# Patient Record
Sex: FEMALE | Race: WHITE | NOT HISPANIC OR LATINO | Employment: OTHER | URBAN - METROPOLITAN AREA
[De-identification: names, ages, dates, MRNs, and addresses within clinical notes are randomized per-mention and may not be internally consistent; named-entity substitution may affect disease eponyms.]

---

## 2019-01-09 ENCOUNTER — TELEPHONE (OUTPATIENT)
Dept: SCHEDULING | Facility: IMAGING CENTER | Age: 69
End: 2019-01-09

## 2019-02-25 ENCOUNTER — OFFICE VISIT (OUTPATIENT)
Dept: MEDICAL GROUP | Facility: MEDICAL CENTER | Age: 69
End: 2019-02-25
Payer: MEDICARE

## 2019-02-25 VITALS
OXYGEN SATURATION: 95 % | SYSTOLIC BLOOD PRESSURE: 132 MMHG | HEART RATE: 82 BPM | DIASTOLIC BLOOD PRESSURE: 88 MMHG | TEMPERATURE: 97.2 F | HEIGHT: 65 IN | BODY MASS INDEX: 43.97 KG/M2 | WEIGHT: 263.89 LBS

## 2019-02-25 DIAGNOSIS — Z00.00 HEALTH CARE MAINTENANCE: ICD-10-CM

## 2019-02-25 DIAGNOSIS — I10 ESSENTIAL HYPERTENSION: ICD-10-CM

## 2019-02-25 DIAGNOSIS — R73.01 IFG (IMPAIRED FASTING GLUCOSE): ICD-10-CM

## 2019-02-25 DIAGNOSIS — E78.5 DYSLIPIDEMIA: ICD-10-CM

## 2019-02-25 DIAGNOSIS — D51.0 PERNICIOUS ANEMIA: ICD-10-CM

## 2019-02-25 DIAGNOSIS — M17.0 PRIMARY OSTEOARTHRITIS OF BOTH KNEES: ICD-10-CM

## 2019-02-25 DIAGNOSIS — Z76.89 ENCOUNTER TO ESTABLISH CARE: ICD-10-CM

## 2019-02-25 DIAGNOSIS — Z12.39 SCREENING FOR MALIGNANT NEOPLASM OF BREAST: ICD-10-CM

## 2019-02-25 PROCEDURE — 99204 OFFICE O/P NEW MOD 45 MIN: CPT | Performed by: INTERNAL MEDICINE

## 2019-02-25 RX ORDER — PNEUMOCOCCAL VACCINE POLYVALENT 25; 25; 25; 25; 25; 25; 25; 25; 25; 25; 25; 25; 25; 25; 25; 25; 25; 25; 25; 25; 25; 25; 25 UG/.5ML; UG/.5ML; UG/.5ML; UG/.5ML; UG/.5ML; UG/.5ML; UG/.5ML; UG/.5ML; UG/.5ML; UG/.5ML; UG/.5ML; UG/.5ML; UG/.5ML; UG/.5ML; UG/.5ML; UG/.5ML; UG/.5ML; UG/.5ML; UG/.5ML; UG/.5ML; UG/.5ML; UG/.5ML; UG/.5ML
INJECTION, SOLUTION INTRAMUSCULAR; SUBCUTANEOUS
COMMUNITY
Start: 2019-01-17 | End: 2020-09-14

## 2019-02-25 RX ORDER — ATORVASTATIN CALCIUM 10 MG/1
TABLET, FILM COATED ORAL
COMMUNITY
Start: 2018-12-15 | End: 2019-10-30 | Stop reason: SDUPTHER

## 2019-02-25 RX ORDER — IRBESARTAN 300 MG/1
TABLET ORAL
COMMUNITY
Start: 2019-01-14 | End: 2019-07-08 | Stop reason: SDUPTHER

## 2019-02-25 RX ORDER — CITALOPRAM 20 MG/1
TABLET ORAL
COMMUNITY
Start: 2018-12-15 | End: 2019-08-19 | Stop reason: SDUPTHER

## 2019-02-25 RX ORDER — HYDROCHLOROTHIAZIDE 25 MG/1
TABLET ORAL
COMMUNITY
Start: 2019-02-13 | End: 2019-09-16

## 2019-02-25 ASSESSMENT — PATIENT HEALTH QUESTIONNAIRE - PHQ9: CLINICAL INTERPRETATION OF PHQ2 SCORE: 0

## 2019-02-25 NOTE — PROGRESS NOTES
CHIEF COMPLAINT  Chief Complaint   Patient presents with   • Establish Care   • Fatigue   • Arthritis     (L) Knee Pain     HPI  Patient is a 69 y.o. female patient who presents today for the following     Hypertension  Meds: Irbesartan 300 mg QD, HCTZ, 25 mg daily, taking as prescribed.   Measuring BP at home: no  Denies:  -  headaches, vision problems, tinnitus.                 -  chest pain/pressure, palpitations, irregular heart beats, exertional, dyspnea, peripheral edema.      - medication side effect: unusual fatigue, slow heartbeat, foot/leg swelling, cough.  Low salt diet: advised  Diet: regular  Exercise: limited  BMI: 43  FH of HTN: unknown    Dyslipidemia  The patient is on atorvastatin, 10 mg, taking daily, as prescribed. No myalgias, muscle cramps or pain.   Diet /Exercise/BMI:  As above  FH: sister    IFG  The patient had elevated FBG, and A1c.  No polydipsia, polyphagia, polyuria.  No abdominal pain, weight loss, fatigue.  Diet/exercise/BMI: As above  FH of DM: sister    Pernicious anemia  The patient was diagnosed with atrophic gastritis on EGD.  Placed on vitamin B12 OTC, improved levels of B12 and CBC.  Pending records.    DJD knees  The patient has history of osteoarthritis of both knees, more on the left side  - per patient, x-ray of knee showed loss of cartilage bilaterally, more on the left side.  She had local steroid injections in the past, requested referral.  Denies:  -Trauma  -Fever, chills, redness, swelling.    OBESITY, Body mass index is 43.58 kg/m².  Onset: since childhood  Diet: Regular  Exercise: Limited  No temperature intolerance. No change in hair/skin quality, BMs.   No HTN, buffalo hump, purple striae, flushing.  FH of obesity: multiple    Reviewed PMH, PSH, FH, SH, ALL, HCM/IMM  Reviewed MEDS    Patient Active Problem List    Diagnosis Date Noted   • Essential hypertension 02/25/2019   • Dyslipidemia 02/25/2019   • IFG (impaired fasting glucose) 02/25/2019   • Primary  osteoarthritis of both knees 02/25/2019   • Pernicious anemia 02/25/2019   • Screening for malignant neoplasm of breast 02/25/2019     CURRENT MEDICATIONS  Current Outpatient Prescriptions   Medication Sig Dispense Refill   • atorvastatin (LIPITOR) 10 MG Tab      • citalopram (CELEXA) 20 MG Tab      • hydroCHLOROthiazide (HYDRODIURIL) 25 MG Tab      • irbesartan (AVAPRO) 300 MG Tab      • PNEUMOVAX 23 25 MCG/0.5ML Injection        No current facility-administered medications for this visit.      ALLERGIES  Allergies: Patient has no known allergies.  PAST MEDICAL HISTORY  Past Medical History:   Diagnosis Date   • Anemia, pernicious    • Anxiety    • Atrophic gastritis    • Hyperlipidemia    • Hypertension    • Osteoarthritis    • Prediabetes     fasting glucose 102   • Tendonitis     chronic, elbows and wrists     SURGICAL HISTORY  She  has a past surgical history that includes cholecystectomy (2014).  SOCIAL HISTORY  Social History   Substance Use Topics   • Smoking status: Former Smoker     Packs/day: 1.00     Years: 30.00     Types: Cigarettes     Quit date: 1992   • Smokeless tobacco: Never Used   • Alcohol use 0.6 oz/week     1 Glasses of wine per week      Comment: 3 times/week     Social History     Social History Narrative   • No narrative on file     FAMILY HISTORY  Family History   Problem Relation Age of Onset   • Alzheimer's Disease Mother 50        early onset   • Cancer Father 50        laryngeal, mets to bladder   • Heart Attack Sister 56   • Diabetes Sister    • Cancer Maternal Aunt 50        colon   • Colon Cancer Sister 69     Family Status   Relation Status   • Mo (Not Specified)   • Fa (Not Specified)   • Sis (Not Specified)   • MAunt (Not Specified)   • Sis (Not Specified)       ROS   Constitutional: Negative for fever, chills.  HENT: Negative for congestion, sore throat.  Eyes: Negative for blurred vision.   Respiratory: Negative for cough, shortness of breath.  Cardiovascular: Negative for  "chest pain, palpitations. And per HPI.  Gastrointestinal: Negative for heartburn, abdominal pain.   Genitourinary: Negative for dysuria/incontinence.  Musculoskeletal: As above.  Skin: Negative for rash and itching.   Neuro: Negative for dizziness, weakness and headaches.   Endo/Heme/Allergies: Does not bruise/bleed easily. And per HPI.  Psychiatric/Behavioral: Negative for depression.    PHYSICAL EXAM   Blood pressure 132/88, pulse 82, temperature 36.2 °C (97.2 °F), temperature source Temporal, height 1.657 m (5' 5.25\"), weight 119.7 kg (263 lb 14.3 oz), SpO2 95 %. Body mass index is 43.58 kg/m².  General:  NAD, well appearing  HEENT:   NC/AT, PERRLA, EOMI, TMs are clear. Oropharyngeal mucosa is pink,  without lesions;  no cervical / supraclavicular  lymphadenopathy, no thyromegaly.    Cardiovascular: RRR.   No m/r/g. No carotid bruits .      Lungs:   CTAB, no w/r/r, no respiratory distress.  Abdomen: Soft, NT/ND + BS; unable to palpate liver/spleen due to obesity.  Extremities:  2+ DP and radial pulses bilaterally.  No c/c/e.   Skin:  Warm, dry.  No erythema. No rash.   Neurologic: Alert & oriented x 3. CN II-XII grossly intact. No focal deficits.  Psychiatric:  Affect normal, mood normal, judgment normal.    LABS     Pending records and labs    IMAGING     None    ASSESMENT AND PLAN        1. Essential hypertension  Controlled, continue current treatment / monitor blood pressure at home  - Comp Metabolic Panel; Future    2. Dyslipidemia  Pending labs, continue current treatment  - Comp Metabolic Panel; Future  - Lipid Profile; Future    3. IFG (impaired fasting glucose)  Discussed about risk to develop DM.   Advised low carb diet, exercise, watch for WT.   - Comp Metabolic Panel; Future  - HEMOGLOBIN A1C; Future  - MICROALBUMIN CREAT RATIO URINE; Future    4. Pernicious anemia  Pending labs, continue current supplement  - CBC WITH DIFFERENTIAL; Future  - VIT B12,  FOLIC ACID    5. Primary osteoarthritis of both " knees  Referred to orthopedics per patient request  - REFERRAL TO ORTHOPEDICS    6. BMI 40.0-44.9, adult (HCC)  - OBESITY COUNSELING (No Charge): Patient identified as having weight management issue.  Appropriate orders and counseling given.    7. Health care maintenance  Pending records    8. Screening for malignant neoplasm of breast  - MA-SCREEN MAMMO W/CAD-BILAT; Future    9. Encounter to establish care  Reviewed PMH, PSH, FH, SH, ALL, MEDS, HCM/IMM.   Release form for old records: signed    Counseling:   - Smoking:  Nonsmoker    Followup: Return in about 4 weeks (around 3/25/2019), or if symptoms worsen or fail to improve.    All questions are answered.    Please note that this dictation was created using voice recognition software, and that there might be errors of angelina and possibly content.

## 2019-03-12 ENCOUNTER — HOSPITAL ENCOUNTER (OUTPATIENT)
Dept: LAB | Facility: MEDICAL CENTER | Age: 69
End: 2019-03-12
Attending: INTERNAL MEDICINE
Payer: MEDICARE

## 2019-03-12 DIAGNOSIS — R73.01 IFG (IMPAIRED FASTING GLUCOSE): ICD-10-CM

## 2019-03-12 DIAGNOSIS — E78.5 DYSLIPIDEMIA: ICD-10-CM

## 2019-03-12 DIAGNOSIS — I10 ESSENTIAL HYPERTENSION: ICD-10-CM

## 2019-03-12 DIAGNOSIS — D51.0 PERNICIOUS ANEMIA: ICD-10-CM

## 2019-03-12 LAB
ALBUMIN SERPL BCP-MCNC: 3.9 G/DL (ref 3.2–4.9)
ALBUMIN/GLOB SERPL: 1.3 G/DL
ALP SERPL-CCNC: 104 U/L (ref 30–99)
ALT SERPL-CCNC: 13 U/L (ref 2–50)
ANION GAP SERPL CALC-SCNC: 5 MMOL/L (ref 0–11.9)
ANISOCYTOSIS BLD QL SMEAR: ABNORMAL
AST SERPL-CCNC: 14 U/L (ref 12–45)
BASOPHILS # BLD AUTO: 1.5 % (ref 0–1.8)
BASOPHILS # BLD: 0.1 K/UL (ref 0–0.12)
BILIRUB SERPL-MCNC: 0.4 MG/DL (ref 0.1–1.5)
BUN SERPL-MCNC: 11 MG/DL (ref 8–22)
CALCIUM SERPL-MCNC: 8.8 MG/DL (ref 8.5–10.5)
CHLORIDE SERPL-SCNC: 104 MMOL/L (ref 96–112)
CHOLEST SERPL-MCNC: 167 MG/DL (ref 100–199)
CO2 SERPL-SCNC: 25 MMOL/L (ref 20–33)
COMMENT 1642: NORMAL
CREAT SERPL-MCNC: 0.54 MG/DL (ref 0.5–1.4)
CREAT UR-MCNC: 94.6 MG/DL
EOSINOPHIL # BLD AUTO: 0.27 K/UL (ref 0–0.51)
EOSINOPHIL NFR BLD: 3.9 % (ref 0–6.9)
ERYTHROCYTE [DISTWIDTH] IN BLOOD BY AUTOMATED COUNT: 43.1 FL (ref 35.9–50)
EST. AVERAGE GLUCOSE BLD GHB EST-MCNC: 117 MG/DL
FASTING STATUS PATIENT QL REPORTED: NORMAL
GIANT PLATELETS BLD QL SMEAR: NORMAL
GLOBULIN SER CALC-MCNC: 2.9 G/DL (ref 1.9–3.5)
GLUCOSE SERPL-MCNC: 103 MG/DL (ref 65–99)
HBA1C MFR BLD: 5.7 % (ref 0–5.6)
HCT VFR BLD AUTO: 37.4 % (ref 37–47)
HDLC SERPL-MCNC: 51 MG/DL
HGB BLD-MCNC: 10.9 G/DL (ref 12–16)
IMM GRANULOCYTES # BLD AUTO: 0.02 K/UL (ref 0–0.11)
IMM GRANULOCYTES NFR BLD AUTO: 0.3 % (ref 0–0.9)
LDLC SERPL CALC-MCNC: 83 MG/DL
LYMPHOCYTES # BLD AUTO: 1.77 K/UL (ref 1–4.8)
LYMPHOCYTES NFR BLD: 25.7 % (ref 22–41)
MCH RBC QN AUTO: 21.2 PG (ref 27–33)
MCHC RBC AUTO-ENTMCNC: 29.1 G/DL (ref 33.6–35)
MCV RBC AUTO: 72.8 FL (ref 81.4–97.8)
MICROALBUMIN UR-MCNC: <0.7 MG/DL
MICROALBUMIN/CREAT UR: NORMAL MG/G (ref 0–30)
MICROCYTES BLD QL SMEAR: ABNORMAL
MONOCYTES # BLD AUTO: 0.55 K/UL (ref 0–0.85)
MONOCYTES NFR BLD AUTO: 8 % (ref 0–13.4)
MORPHOLOGY BLD-IMP: NORMAL
NEUTROPHILS # BLD AUTO: 4.18 K/UL (ref 2–7.15)
NEUTROPHILS NFR BLD: 60.6 % (ref 44–72)
NRBC # BLD AUTO: 0 K/UL
NRBC BLD-RTO: 0 /100 WBC
OVALOCYTES BLD QL SMEAR: NORMAL
PLATELET # BLD AUTO: 511 K/UL (ref 164–446)
PLATELET BLD QL SMEAR: NORMAL
PMV BLD AUTO: 9.6 FL (ref 9–12.9)
POIKILOCYTOSIS BLD QL SMEAR: NORMAL
POTASSIUM SERPL-SCNC: 3.7 MMOL/L (ref 3.6–5.5)
PROT SERPL-MCNC: 6.8 G/DL (ref 6–8.2)
RBC # BLD AUTO: 5.14 M/UL (ref 4.2–5.4)
RBC BLD AUTO: PRESENT
SODIUM SERPL-SCNC: 134 MMOL/L (ref 135–145)
TRIGL SERPL-MCNC: 165 MG/DL (ref 0–149)
WBC # BLD AUTO: 6.9 K/UL (ref 4.8–10.8)

## 2019-03-12 PROCEDURE — 82570 ASSAY OF URINE CREATININE: CPT

## 2019-03-12 PROCEDURE — 36415 COLL VENOUS BLD VENIPUNCTURE: CPT

## 2019-03-12 PROCEDURE — 80061 LIPID PANEL: CPT

## 2019-03-12 PROCEDURE — 83036 HEMOGLOBIN GLYCOSYLATED A1C: CPT | Mod: GA

## 2019-03-12 PROCEDURE — 80053 COMPREHEN METABOLIC PANEL: CPT

## 2019-03-12 PROCEDURE — 82043 UR ALBUMIN QUANTITATIVE: CPT

## 2019-03-12 PROCEDURE — 85025 COMPLETE CBC W/AUTO DIFF WBC: CPT

## 2019-03-13 ENCOUNTER — HOSPITAL ENCOUNTER (OUTPATIENT)
Dept: LAB | Facility: MEDICAL CENTER | Age: 69
End: 2019-03-13
Attending: INTERNAL MEDICINE
Payer: MEDICARE

## 2019-03-13 LAB
FOLATE SERPL-MCNC: 10.4 NG/ML
VIT B12 SERPL-MCNC: 675 PG/ML (ref 211–911)

## 2019-03-13 PROCEDURE — 82607 VITAMIN B-12: CPT

## 2019-03-13 PROCEDURE — 82746 ASSAY OF FOLIC ACID SERUM: CPT

## 2019-03-18 ENCOUNTER — HOSPITAL ENCOUNTER (OUTPATIENT)
Dept: RADIOLOGY | Facility: MEDICAL CENTER | Age: 69
End: 2019-03-18
Attending: INTERNAL MEDICINE
Payer: MEDICARE

## 2019-03-18 DIAGNOSIS — Z12.39 SCREENING FOR MALIGNANT NEOPLASM OF BREAST: ICD-10-CM

## 2019-03-18 PROCEDURE — 77063 BREAST TOMOSYNTHESIS BI: CPT

## 2019-03-25 ENCOUNTER — OFFICE VISIT (OUTPATIENT)
Dept: MEDICAL GROUP | Facility: MEDICAL CENTER | Age: 69
End: 2019-03-25
Payer: MEDICARE

## 2019-03-25 VITALS
TEMPERATURE: 97 F | OXYGEN SATURATION: 95 % | DIASTOLIC BLOOD PRESSURE: 80 MMHG | WEIGHT: 259.26 LBS | BODY MASS INDEX: 43.2 KG/M2 | HEIGHT: 65 IN | SYSTOLIC BLOOD PRESSURE: 118 MMHG | HEART RATE: 96 BPM

## 2019-03-25 DIAGNOSIS — D51.0 PERNICIOUS ANEMIA: ICD-10-CM

## 2019-03-25 DIAGNOSIS — I10 ESSENTIAL HYPERTENSION: ICD-10-CM

## 2019-03-25 DIAGNOSIS — E55.9 HYPOVITAMINOSIS D: ICD-10-CM

## 2019-03-25 DIAGNOSIS — E78.5 DYSLIPIDEMIA: ICD-10-CM

## 2019-03-25 DIAGNOSIS — R73.01 IFG (IMPAIRED FASTING GLUCOSE): ICD-10-CM

## 2019-03-25 PROCEDURE — 99214 OFFICE O/P EST MOD 30 MIN: CPT | Performed by: INTERNAL MEDICINE

## 2019-03-25 RX ORDER — CYANOCOBALAMIN 1000 UG/ML
1000 INJECTION, SOLUTION INTRAMUSCULAR; SUBCUTANEOUS ONCE
Status: DISCONTINUED | OUTPATIENT
Start: 2019-03-25 | End: 2019-03-25

## 2019-03-25 RX ORDER — CHOLECALCIFEROL (VITAMIN D3) 25 MCG
1 TABLET,CHEWABLE ORAL DAILY
Qty: 90 TAB | Refills: 1 | Status: SHIPPED | OUTPATIENT
Start: 2019-03-25 | End: 2020-09-14

## 2019-03-25 NOTE — PROGRESS NOTES
CHIEF COMPLAINT  Chief Complaint   Patient presents with   • Follow-Up     4 week   Hypertension, IFG, labs    HPI  Patient is a 69 y.o. female patient who presents today for the following     Hypertension  Meds: Irbesartan 300 mg QD, HCTZ, 25 mg daily, taking as prescribed.   Measuring BP at home: no  Denies: - headaches, vision problems, tinnitus.  - chest pain/pressure, palpitations, irregular heart beats, exertional, dyspnea, peripheral edema.  Low salt diet: advised  Diet: regular  Exercise: limited  BMI: 43  FH of HTN: unknown  Reviewed renal panel.     Dyslipidemia  The patient is on atorvastatin, 10 mg, taking daily, as prescribed. No myalgias, muscle cramps or pain.   Diet /Exercise/BMI: As above  FH: sister     IFG  The patient had elevated FBG, and A1c.  No polydipsia, polyphagia, polyuria.  No abdominal pain, weight loss, fatigue.  Diet/exercise/BMI: As above  FH of DM: sister  Reviewed labs     Pernicious anemia  The patient was diagnosed with atrophic gastritis on EGD.  Placed on vitamin B12 OTC, improved levels of B12 and CBC.  Reviewed labs.     Hypovitaminosis D  The patient had low vitamin D level.  She has been on vitamin D supplement, OTC.    Reviewed PMH, PSH, FH, SH, ALL, HCM/IMM, no changes  Reviewed MEDS, no changes    Patient Active Problem List    Diagnosis Date Noted   • Essential hypertension 02/25/2019   • Dyslipidemia 02/25/2019   • IFG (impaired fasting glucose) 02/25/2019   • Primary osteoarthritis of both knees 02/25/2019   • Pernicious anemia 02/25/2019   • Screening for malignant neoplasm of breast 02/25/2019     CURRENT MEDICATIONS  Current Outpatient Prescriptions   Medication Sig Dispense Refill   • Cyanocobalamin (B-12) 2500 MCG Tab Take 1 Tab by mouth every day. 90 Tab 1   • atorvastatin (LIPITOR) 10 MG Tab      • citalopram (CELEXA) 20 MG Tab      • hydroCHLOROthiazide (HYDRODIURIL) 25 MG Tab      • irbesartan (AVAPRO) 300 MG Tab      • PNEUMOVAX 23 25 MCG/0.5ML Injection         Current Facility-Administered Medications   Medication Dose Route Frequency Provider Last Rate Last Dose   • cyanocobalamin (VITAMIN B-12) injection 1,000 mcg  1,000 mcg Intramuscular Once Adama Rogers M.D.         ALLERGIES  Allergies: Patient has no known allergies.  PAST MEDICAL HISTORY  Past Medical History:   Diagnosis Date   • Anemia, pernicious    • Anxiety    • Atrophic gastritis    • Hyperlipidemia    • Hypertension    • Osteoarthritis    • Prediabetes     fasting glucose 102   • Tendonitis     chronic, elbows and wrists     SURGICAL HISTORY  She  has a past surgical history that includes cholecystectomy (2014).  SOCIAL HISTORY  Social History   Substance Use Topics   • Smoking status: Former Smoker     Packs/day: 1.00     Years: 30.00     Types: Cigarettes     Quit date: 1992   • Smokeless tobacco: Never Used   • Alcohol use 0.6 oz/week     1 Glasses of wine per week      Comment: 3 times/week     Social History     Social History Narrative   • No narrative on file     FAMILY HISTORY  Family History   Problem Relation Age of Onset   • Alzheimer's Disease Mother 50        early onset   • Cancer Father 50        laryngeal, mets to bladder   • Heart Attack Sister 56   • Diabetes Sister    • Hyperlipidemia Sister    • Cancer Maternal Aunt 50        colon   • Colon Cancer Sister 69   • Hypertension Neg Hx      Family Status   Relation Status   • Mo (Not Specified)   • Fa (Not Specified)   • Sis (Not Specified)   • MAunt (Not Specified)   • Sis (Not Specified)   • Neg Hx (Not Specified)     ROS   Constitutional: Negative for fever, chills.  HENT: Negative for congestion, sore throat.  Eyes: Negative for blurred vision.   Respiratory: Negative for cough, shortness of breath.  Cardiovascular: Negative for chest pain, palpitations. And per HPI.  Gastrointestinal: Negative for heartburn, abdominal pain.   Genitourinary: Negative for dysuria.  Musculoskeletal: Negative for significant myalgias, back pain  "and joint pain.   Skin: Negative for rash and itching.   Neuro: Negative for dizziness, weakness and headaches.   Endo/Heme/Allergies: Does not bruise/bleed easily. And per HPI.  Psychiatric/Behavioral: Negative for depression.    PHYSICAL EXAM   Blood pressure 118/80, pulse 96, temperature 36.1 °C (97 °F), temperature source Temporal, height 1.651 m (5' 5\"), weight 117.6 kg (259 lb 4.2 oz), SpO2 95 %. Body mass index is 43.14 kg/m².  General:  NAD, well appearing  HEENT:   NC/AT, PERRLA, EOMI, TMs are clear. Oropharyngeal mucosa is pink,  without lesions;  no cervical / supraclavicular  lymphadenopathy, no thyromegaly.    Cardiovascular: RRR.   No m/r/g. No carotid bruits .      Lungs:   CTAB, no w/r/r, no respiratory distress.  Abdomen: Soft, NT/ND + BS; no hepatosplenomegaly.  Extremities:  2+ DP and radial pulses bilaterally.  No c/c/e.   Skin:  Warm, dry.  No erythema. No rash.   Neurologic: Alert & oriented x 3. CN II-XII grossly intact. No focal deficits.  Psychiatric:  Affect normal, mood normal, judgment normal.    LABS     Labs are reviewed and discussed with a patient  Lab Results   Component Value Date/Time    CHOLSTRLTOT 167 03/12/2019 10:41 AM    LDL 83 03/12/2019 10:41 AM    HDL 51 03/12/2019 10:41 AM    TRIGLYCERIDE 165 (H) 03/12/2019 10:41 AM       Lab Results   Component Value Date/Time    SODIUM 134 (L) 03/12/2019 10:41 AM    POTASSIUM 3.7 03/12/2019 10:41 AM    CHLORIDE 104 03/12/2019 10:41 AM    CO2 25 03/12/2019 10:41 AM    GLUCOSE 103 (H) 03/12/2019 10:41 AM    BUN 11 03/12/2019 10:41 AM    CREATININE 0.54 03/12/2019 10:41 AM     Lab Results   Component Value Date/Time    ALKPHOSPHAT 104 (H) 03/12/2019 10:41 AM    ASTSGOT 14 03/12/2019 10:41 AM    ALTSGPT 13 03/12/2019 10:41 AM    TBILIRUBIN 0.4 03/12/2019 10:41 AM      Lab Results   Component Value Date/Time    HBA1C 5.7 (H) 03/12/2019 10:41 AM      Ref. Range 3/13/2019    Vitamin B12  211 - 911 pg/mL 675   Folic Acid >4.0 ng/mL 10.4     Lab " Results   Component Value Date/Time    WBC 6.9 03/12/2019 10:41 AM    RBC 5.14 03/12/2019 10:41 AM    HEMOGLOBIN 10.9 (L) 03/12/2019 10:41 AM    HEMATOCRIT 37.4 03/12/2019 10:41 AM    MCV 72.8 (L) 03/12/2019 10:41 AM    MCH 21.2 (L) 03/12/2019 10:41 AM    MCHC 29.1 (L) 03/12/2019 10:41 AM    MPV 9.6 03/12/2019 10:41 AM    NEUTSPOLYS 60.60 03/12/2019 10:41 AM    LYMPHOCYTES 25.70 03/12/2019 10:41 AM    MONOCYTES 8.00 03/12/2019 10:41 AM    EOSINOPHILS 3.90 03/12/2019 10:41 AM    BASOPHILS 1.50 03/12/2019 10:41 AM    ANISOCYTOSIS 1+ 03/12/2019 10:41 AM      IMAGING     None    ASSESMENT AND PLAN        1. Essential hypertension  Controlled, continue current treatment  - Comp Metabolic Panel; Future    2. Dyslipidemia  Controlled, continue current treatment  - Comp Metabolic Panel; Future  - Lipid Profile; Future    3. IFG (impaired fasting glucose)  Discussed about risk to develop DM.   Advised low carb diet, exercise, watch for WT.   - Comp Metabolic Panel; Future    4. Pernicious anemia  Controlled, continue B12 supplementation.  - Cyanocobalamin (B-12) 2500 MCG Tab; Take 1 Tab by mouth every day.  Dispense: 90 Tab; Refill: 1  - CBC WITH DIFFERENTIAL; Future    5. Hypovitaminosis D  Continue current supplement, follow-up labs  - VITAMIN D,25 HYDROXY; Future    Counseling:   - Smoking:  Nonsmoker    Followup: Return in about 3 months (around 6/25/2019).    All questions are answered.    Please note that this dictation was created using voice recognition software, and that there might be errors of angelina and possibly content.

## 2019-03-26 ENCOUNTER — TELEPHONE (OUTPATIENT)
Dept: RADIOLOGY | Facility: MEDICAL CENTER | Age: 69
End: 2019-03-26

## 2019-03-26 NOTE — TELEPHONE ENCOUNTER
Spoke to pt she is aware that we are waiting for prior imaging before scheduling further imaging let her know if we haven't received them in a week we would follow up with the patient/jls

## 2019-03-28 ENCOUNTER — HOSPITAL ENCOUNTER (OUTPATIENT)
Dept: RADIOLOGY | Facility: MEDICAL CENTER | Age: 69
End: 2019-03-28
Attending: INTERNAL MEDICINE
Payer: MEDICARE

## 2019-03-28 ENCOUNTER — HOSPITAL ENCOUNTER (OUTPATIENT)
Dept: RADIOLOGY | Facility: MEDICAL CENTER | Age: 69
End: 2019-03-28

## 2019-03-28 DIAGNOSIS — R92.8 ABNORMAL MAMMOGRAM: ICD-10-CM

## 2019-03-28 PROCEDURE — 76642 ULTRASOUND BREAST LIMITED: CPT | Mod: LT

## 2019-03-28 PROCEDURE — G0279 TOMOSYNTHESIS, MAMMO: HCPCS | Mod: LT

## 2019-04-03 ENCOUNTER — HOSPITAL ENCOUNTER (OUTPATIENT)
Dept: RADIOLOGY | Facility: MEDICAL CENTER | Age: 69
End: 2019-04-03
Attending: INTERNAL MEDICINE
Payer: MEDICARE

## 2019-04-03 DIAGNOSIS — R92.8 ABNORMAL FINDINGS ON DIAGNOSTIC IMAGING OF BREAST: ICD-10-CM

## 2019-04-03 LAB — PATHOLOGY CONSULT NOTE: NORMAL

## 2019-04-03 PROCEDURE — 88305 TISSUE EXAM BY PATHOLOGIST: CPT

## 2019-04-03 PROCEDURE — 19083 BX BREAST 1ST LESION US IMAG: CPT

## 2019-04-04 ENCOUNTER — TELEPHONE (OUTPATIENT)
Dept: RADIOLOGY | Facility: MEDICAL CENTER | Age: 69
End: 2019-04-04

## 2019-06-26 ENCOUNTER — HOSPITAL ENCOUNTER (OUTPATIENT)
Dept: LAB | Facility: MEDICAL CENTER | Age: 69
End: 2019-06-26
Attending: INTERNAL MEDICINE
Payer: MEDICARE

## 2019-06-26 DIAGNOSIS — E78.5 DYSLIPIDEMIA: ICD-10-CM

## 2019-06-26 DIAGNOSIS — R73.01 IFG (IMPAIRED FASTING GLUCOSE): ICD-10-CM

## 2019-06-26 DIAGNOSIS — D51.0 PERNICIOUS ANEMIA: ICD-10-CM

## 2019-06-26 DIAGNOSIS — E55.9 HYPOVITAMINOSIS D: ICD-10-CM

## 2019-06-26 DIAGNOSIS — I10 ESSENTIAL HYPERTENSION: ICD-10-CM

## 2019-06-26 LAB
25(OH)D3 SERPL-MCNC: 22 NG/ML (ref 30–100)
ALBUMIN SERPL BCP-MCNC: 4.1 G/DL (ref 3.2–4.9)
ALBUMIN/GLOB SERPL: 1.5 G/DL
ALP SERPL-CCNC: 97 U/L (ref 30–99)
ALT SERPL-CCNC: 13 U/L (ref 2–50)
ANION GAP SERPL CALC-SCNC: 9 MMOL/L (ref 0–11.9)
ANISOCYTOSIS BLD QL SMEAR: ABNORMAL
AST SERPL-CCNC: 16 U/L (ref 12–45)
BASOPHILS # BLD AUTO: 1.7 % (ref 0–1.8)
BASOPHILS # BLD: 0.1 K/UL (ref 0–0.12)
BILIRUB SERPL-MCNC: 0.3 MG/DL (ref 0.1–1.5)
BUN SERPL-MCNC: 11 MG/DL (ref 8–22)
CALCIUM SERPL-MCNC: 8.8 MG/DL (ref 8.5–10.5)
CHLORIDE SERPL-SCNC: 104 MMOL/L (ref 96–112)
CHOLEST SERPL-MCNC: 184 MG/DL (ref 100–199)
CO2 SERPL-SCNC: 24 MMOL/L (ref 20–33)
COMMENT 1642: NORMAL
CREAT SERPL-MCNC: 0.64 MG/DL (ref 0.5–1.4)
EOSINOPHIL # BLD AUTO: 0.2 K/UL (ref 0–0.51)
EOSINOPHIL NFR BLD: 3.3 % (ref 0–6.9)
ERYTHROCYTE [DISTWIDTH] IN BLOOD BY AUTOMATED COUNT: 44.4 FL (ref 35.9–50)
FASTING STATUS PATIENT QL REPORTED: NORMAL
GLOBULIN SER CALC-MCNC: 2.8 G/DL (ref 1.9–3.5)
GLUCOSE SERPL-MCNC: 104 MG/DL (ref 65–99)
HCT VFR BLD AUTO: 36.7 % (ref 37–47)
HDLC SERPL-MCNC: 41 MG/DL
HGB BLD-MCNC: 10.9 G/DL (ref 12–16)
HYPOCHROMIA BLD QL SMEAR: ABNORMAL
IMM GRANULOCYTES # BLD AUTO: 0.01 K/UL (ref 0–0.11)
IMM GRANULOCYTES NFR BLD AUTO: 0.2 % (ref 0–0.9)
LDLC SERPL CALC-MCNC: 93 MG/DL
LG PLATELETS BLD QL SMEAR: NORMAL
LYMPHOCYTES # BLD AUTO: 1.95 K/UL (ref 1–4.8)
LYMPHOCYTES NFR BLD: 32.2 % (ref 22–41)
MCH RBC QN AUTO: 20.8 PG (ref 27–33)
MCHC RBC AUTO-ENTMCNC: 29.7 G/DL (ref 33.6–35)
MCV RBC AUTO: 70 FL (ref 81.4–97.8)
MICROCYTES BLD QL SMEAR: ABNORMAL
MONOCYTES # BLD AUTO: 0.55 K/UL (ref 0–0.85)
MONOCYTES NFR BLD AUTO: 9.1 % (ref 0–13.4)
MORPHOLOGY BLD-IMP: NORMAL
NEUTROPHILS # BLD AUTO: 3.25 K/UL (ref 2–7.15)
NEUTROPHILS NFR BLD: 53.5 % (ref 44–72)
NRBC # BLD AUTO: 0 K/UL
NRBC BLD-RTO: 0 /100 WBC
OVALOCYTES BLD QL SMEAR: NORMAL
PLATELET # BLD AUTO: 522 K/UL (ref 164–446)
PLATELET BLD QL SMEAR: NORMAL
PMV BLD AUTO: 9.3 FL (ref 9–12.9)
POIKILOCYTOSIS BLD QL SMEAR: NORMAL
POTASSIUM SERPL-SCNC: 3.7 MMOL/L (ref 3.6–5.5)
PROT SERPL-MCNC: 6.9 G/DL (ref 6–8.2)
RBC # BLD AUTO: 5.24 M/UL (ref 4.2–5.4)
RBC BLD AUTO: PRESENT
SODIUM SERPL-SCNC: 137 MMOL/L (ref 135–145)
TRIGL SERPL-MCNC: 248 MG/DL (ref 0–149)
WBC # BLD AUTO: 6.1 K/UL (ref 4.8–10.8)

## 2019-06-26 PROCEDURE — 36415 COLL VENOUS BLD VENIPUNCTURE: CPT

## 2019-06-26 PROCEDURE — 82306 VITAMIN D 25 HYDROXY: CPT

## 2019-06-26 PROCEDURE — 85025 COMPLETE CBC W/AUTO DIFF WBC: CPT

## 2019-06-26 PROCEDURE — 80053 COMPREHEN METABOLIC PANEL: CPT

## 2019-06-26 PROCEDURE — 80061 LIPID PANEL: CPT

## 2019-07-01 ENCOUNTER — OFFICE VISIT (OUTPATIENT)
Dept: MEDICAL GROUP | Facility: MEDICAL CENTER | Age: 69
End: 2019-07-01
Payer: MEDICARE

## 2019-07-01 VITALS
HEART RATE: 93 BPM | HEIGHT: 65 IN | OXYGEN SATURATION: 95 % | TEMPERATURE: 97 F | WEIGHT: 255.29 LBS | BODY MASS INDEX: 42.53 KG/M2 | SYSTOLIC BLOOD PRESSURE: 138 MMHG | DIASTOLIC BLOOD PRESSURE: 80 MMHG

## 2019-07-01 DIAGNOSIS — E55.9 HYPOVITAMINOSIS D: ICD-10-CM

## 2019-07-01 DIAGNOSIS — Z78.0 MENOPAUSE: ICD-10-CM

## 2019-07-01 DIAGNOSIS — Z11.59 NEED FOR HEPATITIS C SCREENING TEST: ICD-10-CM

## 2019-07-01 DIAGNOSIS — R73.01 IFG (IMPAIRED FASTING GLUCOSE): ICD-10-CM

## 2019-07-01 DIAGNOSIS — D64.9 ANEMIA, UNSPECIFIED TYPE: ICD-10-CM

## 2019-07-01 DIAGNOSIS — D51.0 PERNICIOUS ANEMIA: ICD-10-CM

## 2019-07-01 DIAGNOSIS — I10 ESSENTIAL HYPERTENSION: ICD-10-CM

## 2019-07-01 DIAGNOSIS — E78.5 DYSLIPIDEMIA: ICD-10-CM

## 2019-07-01 PROCEDURE — 99214 OFFICE O/P EST MOD 30 MIN: CPT | Performed by: INTERNAL MEDICINE

## 2019-07-01 RX ORDER — CYANOCOBALAMIN 1000 UG/ML
1000 INJECTION, SOLUTION INTRAMUSCULAR; SUBCUTANEOUS ONCE
Status: COMPLETED | OUTPATIENT
Start: 2019-07-01 | End: 2019-07-01

## 2019-07-01 RX ORDER — ERGOCALCIFEROL 1.25 MG/1
50000 CAPSULE ORAL
Qty: 12 CAP | Refills: 1 | Status: SHIPPED | OUTPATIENT
Start: 2019-07-01 | End: 2020-02-13 | Stop reason: SDUPTHER

## 2019-07-01 RX ORDER — CYANOCOBALAMIN 1000 UG/ML
1000 INJECTION, SOLUTION INTRAMUSCULAR; SUBCUTANEOUS
Qty: 4 ML | Refills: 0 | Status: SHIPPED | OUTPATIENT
Start: 2019-07-01 | End: 2019-09-15 | Stop reason: SDUPTHER

## 2019-07-01 RX ADMIN — CYANOCOBALAMIN 1000 MCG: 1000 INJECTION, SOLUTION INTRAMUSCULAR; SUBCUTANEOUS at 11:07

## 2019-07-01 NOTE — PROGRESS NOTES
CHIEF COMPLAINT  Chief Complaint   Patient presents with   • Follow-Up     3 month   IFG    HPI  Mini Pham is a 69 y.o. female who presents today for the following     Hypertension  Meds: Irbesartan 300 mg QD, HCTZ, 25 mg daily, taking as prescribed.   Measuring BP at home: no  Denies: - headaches, vision problems, tinnitus.  - chest pain/pressure, palpitations, irregular heart beats, exertional, dyspnea, peripheral edema.  Low salt diet: advised  Diet: regular  Exercise: limited  BMI: 42  FH of HTN: unknown  Reviewed renal panel.     Dyslipidemia  The patient is on atorvastatin, 10 mg, taking daily, as prescribed. No myalgias, muscle cramps or pain.   Diet /Exercise/BMI: As above  FH: sister     IFG  The patient had elevated FBG, and A1c at 5.7.  No polydipsia, polyphagia, polyuria.  No abdominal pain, weight loss, fatigue.  Diet/exercise/BMI: As above  FH of DM: sister  Reviewed labs     Anemia / Pernicious anemia  The patient was diagnosed with atrophic gastritis on EGD.  Placed on vitamin B12 OTC, improved levels of B12 and CBC.  Reviewed labs.     Hypovitaminosis D  The patient had low vitamin D level at 22.  She has been on vitamin D supplement, OTC.       Reviewed PMH, PSH, FH, SH, ALL, HCM/IMM, no changes  Reviewed MEDS, no changes    Patient Active Problem List    Diagnosis Date Noted   • Essential hypertension 02/25/2019   • Dyslipidemia 02/25/2019   • IFG (impaired fasting glucose) 02/25/2019   • Primary osteoarthritis of both knees 02/25/2019   • Pernicious anemia 02/25/2019   • Screening for malignant neoplasm of breast 02/25/2019     CURRENT MEDICATIONS  Current Outpatient Prescriptions   Medication Sig Dispense Refill   • cyanocobalamin (VITAMIN B-12) 1000 MCG/ML Solution 1 mL by Intramuscular route every 30 days for 90 days. 4 mL 0   • vitamin D, Ergocalciferol, (DRISDOL) 95670 units Cap capsule Take 1 Cap by mouth every 7 days. 12 Cap 1   • Cyanocobalamin (B-12) 2500 MCG Tab Take 1 Tab by mouth  every day. 90 Tab 1   • atorvastatin (LIPITOR) 10 MG Tab      • citalopram (CELEXA) 20 MG Tab      • hydroCHLOROthiazide (HYDRODIURIL) 25 MG Tab      • irbesartan (AVAPRO) 300 MG Tab      • PNEUMOVAX 23 25 MCG/0.5ML Injection        Current Facility-Administered Medications   Medication Dose Route Frequency Provider Last Rate Last Dose   • cyanocobalamin (VITAMIN B-12) injection 1,000 mcg  1,000 mcg Intramuscular Once Adama Rogers M.D.         ALLERGIES  Allergies: Patient has no known allergies.  PAST MEDICAL HISTORY  Past Medical History:   Diagnosis Date   • Anemia, pernicious    • Anxiety    • Atrophic gastritis    • Hyperlipidemia    • Hypertension    • Osteoarthritis    • Prediabetes     fasting glucose 102   • Tendonitis     chronic, elbows and wrists     SURGICAL HISTORY  She  has a past surgical history that includes cholecystectomy (2014).  SOCIAL HISTORY  Social History   Substance Use Topics   • Smoking status: Former Smoker     Packs/day: 1.00     Years: 30.00     Types: Cigarettes     Quit date: 1992   • Smokeless tobacco: Never Used   • Alcohol use 0.6 oz/week     1 Glasses of wine per week      Comment: 3 times/week     Social History     Social History Narrative   • No narrative on file     FAMILY HISTORY  Family History   Problem Relation Age of Onset   • Alzheimer's Disease Mother 50        early onset   • Cancer Father 50        laryngeal, mets to bladder   • Heart Attack Sister 56   • Diabetes Sister    • Hyperlipidemia Sister    • Cancer Maternal Aunt 50        colon   • Colon Cancer Sister 69   • Hypertension Neg Hx      Family Status   Relation Status   • Mo (Not Specified)   • Fa (Not Specified)   • Sis (Not Specified)   • MAunt (Not Specified)   • Sis (Not Specified)   • Neg Hx (Not Specified)     ROS   Constitutional: Negative for fever, chills, fatigue.  HENT: Negative for congestion, sore throat.  Eyes: Negative for vision problems.   Respiratory: Negative for cough, shortness  "of breath.  Cardiovascular: Negative for chest pain, palpitations.   Gastrointestinal: Negative for heartburn, nausea, abdominal pain.   Genitourinary: Negative for dysuria.  Musculoskeletal: Negative for significant back pain.   Skin: Negative for rash.   Neuro: Negative for dizziness, weakness and headaches.   Endo/Heme/Allergies: Does not bruise/bleed easily.   Psychiatric/Behavioral: Negative for depression.    PHYSICAL EXAM   /80   Pulse 93   Temp 36.1 °C (97 °F) (Temporal)   Ht 1.651 m (5' 5\")   Wt 115.8 kg (255 lb 4.7 oz)   SpO2 95%  Body mass index is 42.48 kg/m².  General:  NAD, well appearing  HEENT:   NC/AT, PERRLA, EOMI, TMs are clear. Oropharyngeal mucosa is pink,  without lesions;  no cervical / supraclavicular  lymphadenopathy, no thyromegaly.    Cardiovascular: RRR.   No m/r/g.       Lungs:   CTAB, no w/r/r, no respiratory distress.  Abdomen: Soft, NT/ND; unable to palpate liver/spleen due to WT.  Extremities:  2+ DP and radial pulses bilaterally.  No c/c/e.   Skin:  Warm, dry.  No erythema. No rash.   Neurologic: Alert & oriented x 3. CN II-XII grossly intact. No focal deficits.  Psychiatric:  Affect normal, mood normal, judgment normal.    Labs     Labs are reviewed and discussed with a patient  Lab Results   Component Value Date/Time    CHOLSTRLTOT 184 06/26/2019 08:29 AM    LDL 93 06/26/2019 08:29 AM    HDL 41 06/26/2019 08:29 AM    TRIGLYCERIDE 248 (H) 06/26/2019 08:29 AM       Lab Results   Component Value Date/Time    SODIUM 137 06/26/2019 08:29 AM    POTASSIUM 3.7 06/26/2019 08:29 AM    CHLORIDE 104 06/26/2019 08:29 AM    CO2 24 06/26/2019 08:29 AM    GLUCOSE 104 (H) 06/26/2019 08:29 AM    BUN 11 06/26/2019 08:29 AM    CREATININE 0.64 06/26/2019 08:29 AM     Lab Results   Component Value Date/Time    ALKPHOSPHAT 97 06/26/2019 08:29 AM    ASTSGOT 16 06/26/2019 08:29 AM    ALTSGPT 13 06/26/2019 08:29 AM    TBILIRUBIN 0.3 06/26/2019 08:29 AM      Lab Results   Component Value " Date/Time    HBA1C 5.7 (H) 03/12/2019 10:41 AM     Lab Results   Component Value Date/Time    WBC 6.1 06/26/2019 08:29 AM    RBC 5.24 06/26/2019 08:29 AM    HEMOGLOBIN 10.9 (L) 06/26/2019 08:29 AM    HEMATOCRIT 36.7 (L) 06/26/2019 08:29 AM    MCV 70.0 (L) 06/26/2019 08:29 AM    MCH 20.8 (L) 06/26/2019 08:29 AM    MCHC 29.7 (L) 06/26/2019 08:29 AM    MPV 9.3 06/26/2019 08:29 AM    NEUTSPOLYS 53.50 06/26/2019 08:29 AM    LYMPHOCYTES 32.20 06/26/2019 08:29 AM    MONOCYTES 9.10 06/26/2019 08:29 AM    EOSINOPHILS 3.30 06/26/2019 08:29 AM    BASOPHILS 1.70 06/26/2019 08:29 AM    HYPOCHROMIA 1+ 06/26/2019 08:29 AM    ANISOCYTOSIS 1+ 06/26/2019 08:29 AM      Imaging     None    Assessment and Plan     Mini Pham is a 69 y.o. female    1. Essential hypertension  Controlled, continue current treatment  - Comp Metabolic Panel; Future    2. Dyslipidemia  Triglycerides are slightly higher than before, continue current treatment, follow-up labs  - Comp Metabolic Panel; Future  - Lipid Profile; Future    3. IFG (impaired fasting glucose)  Stable.  Discussed about risk to develop DM.   Advised low carb diet, exercise, watch for WT.   - Comp Metabolic Panel; Future  - HEMOGLOBIN A1C; Future    4. Pernicious anemia  Complains of fatigue, switch from p.o. B12 to IM.  - cyanocobalamin (VITAMIN B-12) 1000 MCG/ML Solution; 1 mL by Intramuscular route every 30 days for 90 days.  Dispense: 4 mL; Refill: 0  - CBC WITH DIFFERENTIAL; Future  - VITAMIN B12; Future  - cyanocobalamin (VITAMIN B-12) injection 1,000 mcg; 1 mL by Intramuscular route Once.    5. Anemia, unspecified type  Pending labs  - CBC WITH DIFFERENTIAL; Future  - IRON/TOTAL IRON BIND; Future  - FERRITIN; Future    6. Hypovitaminosis D  Given high-dose vitamin D  - VITAMIN D,25 HYDROXY; Future  - vitamin D, Ergocalciferol, (DRISDOL) 18639 units Cap capsule; Take 1 Cap by mouth every 7 days.  Dispense: 12 Cap; Refill: 1    7. Need for hepatitis C screening test  - HEP C  VIRUS ANTIBODY; Future    8. BMI 40.0-44.9, adult (HCC)  - OBESITY COUNSELING (No Charge): Patient identified as having weight management issue.  Appropriate orders and counseling given.    9. Menopause  - DS-BONE DENSITY STUDY (DEXA); Future    Counseling:   - Smoking:  Nonsmoker    Followup: Return in about 3 months (around 10/1/2019) for Annual, Labs.    All questions are answered.    Please note that this dictation was created using voice recognition software, and that there might be errors of angelina and possibly content.

## 2019-07-08 ENCOUNTER — PATIENT MESSAGE (OUTPATIENT)
Dept: MEDICAL GROUP | Facility: MEDICAL CENTER | Age: 69
End: 2019-07-08

## 2019-07-08 RX ORDER — IRBESARTAN 300 MG/1
300 TABLET ORAL DAILY
Qty: 90 TAB | Refills: 1 | Status: SHIPPED | OUTPATIENT
Start: 2019-07-08 | End: 2019-09-16

## 2019-07-16 ENCOUNTER — TELEPHONE (OUTPATIENT)
Dept: MEDICAL GROUP | Facility: MEDICAL CENTER | Age: 69
End: 2019-07-16

## 2019-07-16 RX ORDER — LOSARTAN POTASSIUM 25 MG/1
50 TABLET ORAL DAILY
Qty: 90 TAB | Refills: 1 | Status: SHIPPED | OUTPATIENT
Start: 2019-07-16 | End: 2019-08-22 | Stop reason: SDUPTHER

## 2019-07-16 RX ORDER — LOSARTAN POTASSIUM 25 MG/1
25 TABLET ORAL DAILY
COMMUNITY
End: 2019-07-16 | Stop reason: SDUPTHER

## 2019-07-16 NOTE — TELEPHONE ENCOUNTER
1. Caller Name: Mini Pham         Call Back Number: [phone]      Patient approves a detailed voicemail message: yes    Left msg for Mini  irbesartan is back ordered changed to losartan

## 2019-08-19 ENCOUNTER — PATIENT MESSAGE (OUTPATIENT)
Dept: MEDICAL GROUP | Facility: MEDICAL CENTER | Age: 69
End: 2019-08-19

## 2019-08-19 RX ORDER — CITALOPRAM 20 MG/1
20 TABLET ORAL DAILY
Qty: 30 TAB | Refills: 0 | Status: SHIPPED | OUTPATIENT
Start: 2019-08-19 | End: 2019-09-23 | Stop reason: SDUPTHER

## 2019-08-22 RX ORDER — LOSARTAN POTASSIUM 25 MG/1
50 TABLET ORAL DAILY
Qty: 90 TAB | Refills: 1 | Status: SHIPPED | OUTPATIENT
Start: 2019-08-22 | End: 2019-11-14 | Stop reason: SDUPTHER

## 2019-09-06 ENCOUNTER — NON-PROVIDER VISIT (OUTPATIENT)
Dept: MEDICAL GROUP | Facility: MEDICAL CENTER | Age: 69
End: 2019-09-06
Payer: MEDICARE

## 2019-09-06 DIAGNOSIS — E53.8 B12 DEFICIENCY: ICD-10-CM

## 2019-09-06 RX ORDER — CYANOCOBALAMIN 1000 UG/ML
1000 INJECTION, SOLUTION INTRAMUSCULAR; SUBCUTANEOUS ONCE
Status: COMPLETED | OUTPATIENT
Start: 2019-09-06 | End: 2019-09-06

## 2019-09-06 RX ADMIN — CYANOCOBALAMIN 1000 MCG: 1000 INJECTION, SOLUTION INTRAMUSCULAR; SUBCUTANEOUS at 12:19

## 2019-09-12 ENCOUNTER — HOSPITAL ENCOUNTER (OUTPATIENT)
Dept: RADIOLOGY | Facility: MEDICAL CENTER | Age: 69
End: 2019-09-12
Attending: INTERNAL MEDICINE
Payer: MEDICARE

## 2019-09-12 DIAGNOSIS — Z78.0 MENOPAUSE: ICD-10-CM

## 2019-09-12 PROCEDURE — 77080 DXA BONE DENSITY AXIAL: CPT

## 2019-09-15 DIAGNOSIS — D51.0 PERNICIOUS ANEMIA: ICD-10-CM

## 2019-09-15 RX ORDER — CYANOCOBALAMIN 1000 UG/ML
INJECTION, SOLUTION INTRAMUSCULAR; SUBCUTANEOUS
Qty: 3 ML | Refills: 0 | Status: SHIPPED | OUTPATIENT
Start: 2019-09-15 | End: 2020-09-14

## 2019-09-16 ENCOUNTER — OFFICE VISIT (OUTPATIENT)
Dept: URGENT CARE | Facility: CLINIC | Age: 69
End: 2019-09-16
Payer: MEDICARE

## 2019-09-16 VITALS
DIASTOLIC BLOOD PRESSURE: 92 MMHG | SYSTOLIC BLOOD PRESSURE: 160 MMHG | TEMPERATURE: 98.3 F | BODY MASS INDEX: 41.6 KG/M2 | RESPIRATION RATE: 20 BRPM | OXYGEN SATURATION: 95 % | WEIGHT: 250 LBS | HEART RATE: 78 BPM

## 2019-09-16 DIAGNOSIS — S16.1XXA STRAIN OF CERVICAL PORTION OF LEFT TRAPEZIUS MUSCLE: Primary | ICD-10-CM

## 2019-09-16 PROCEDURE — 99214 OFFICE O/P EST MOD 30 MIN: CPT | Performed by: PHYSICIAN ASSISTANT

## 2019-09-16 RX ORDER — METHYLPREDNISOLONE 4 MG/1
TABLET ORAL
Qty: 21 TAB | Refills: 0 | Status: SHIPPED | OUTPATIENT
Start: 2019-09-16 | End: 2020-02-13

## 2019-09-16 RX ORDER — CYCLOBENZAPRINE HCL 5 MG
5-10 TABLET ORAL 3 TIMES DAILY PRN
Qty: 30 TAB | Refills: 0 | Status: SHIPPED | OUTPATIENT
Start: 2019-09-16 | End: 2020-02-13

## 2019-09-16 NOTE — PROGRESS NOTES
Subjective:      Pt is a 69 y.o. female who presents with Neck Pain (Couple days neck pain )            HPI  This is a new problem. Pt notes awoke 3 days ago with left sided neck stiffness and pain with movement without known trauma or injury. Pt has not taken any Rx medications for this condition. Pt states the pain is a 7/10, aching in nature and worse at night. Pt denies CP, SOB, NVD, paresthesias, headaches, dizziness, change in vision, hives, or other joint pain. The pt's medication list, problem list, and allergies have been evaluated and reviewed during today's visit.    PMH:  Past Medical History:   Diagnosis Date   • Anemia, pernicious    • Anxiety    • Atrophic gastritis    • Hyperlipidemia    • Hypertension    • Osteoarthritis    • Prediabetes     fasting glucose 102   • Tendonitis     chronic, elbows and wrists       PSH:  Past Surgical History:   Procedure Laterality Date   • CHOLECYSTECTOMY  2014       Fam Hx:    family history includes Alzheimer's Disease (age of onset: 50) in her mother; Cancer (age of onset: 50) in her father and maternal aunt; Colon Cancer (age of onset: 69) in her sister; Diabetes in her sister; Heart Attack (age of onset: 56) in her sister; Hyperlipidemia in her sister.  Family Status   Relation Name Status   • Mo  (Not Specified)   • Fa  (Not Specified)   • Sis  (Not Specified)   • MAunt  (Not Specified)   • Sis  (Not Specified)   • Neg Hx  (Not Specified)       Soc HX:  Social History     Socioeconomic History   • Marital status: Single     Spouse name: Not on file   • Number of children: Not on file   • Years of education: Not on file   • Highest education level: Not on file   Occupational History   • Not on file   Social Needs   • Financial resource strain: Not on file   • Food insecurity:     Worry: Not on file     Inability: Not on file   • Transportation needs:     Medical: Not on file     Non-medical: Not on file   Tobacco Use   • Smoking status: Former Smoker      Packs/day: 1.00     Years: 30.00     Pack years: 30.00     Types: Cigarettes     Last attempt to quit:      Years since quittin.7   • Smokeless tobacco: Never Used   Substance and Sexual Activity   • Alcohol use: Yes     Alcohol/week: 0.6 oz     Types: 1 Glasses of wine per week     Comment: 3 times/week   • Drug use: Yes     Types: Cocaine, Marijuana     Comment: Remote, 50 years ago   • Sexual activity: Never   Lifestyle   • Physical activity:     Days per week: Not on file     Minutes per session: Not on file   • Stress: Not on file   Relationships   • Social connections:     Talks on phone: Not on file     Gets together: Not on file     Attends Spiritism service: Not on file     Active member of club or organization: Not on file     Attends meetings of clubs or organizations: Not on file     Relationship status: Not on file   • Intimate partner violence:     Fear of current or ex partner: Not on file     Emotionally abused: Not on file     Physically abused: Not on file     Forced sexual activity: Not on file   Other Topics Concern   • Not on file   Social History Narrative   • Not on file         Medications:    Current Outpatient Medications:   •  cyclobenzaprine (FLEXERIL) 5 MG tablet, Take 1-2 Tabs by mouth 3 times a day as needed., Disp: 30 Tab, Rfl: 0  •  methylPREDNISolone (MEDROL DOSEPAK) 4 MG Tablet Therapy Pack, Use as directed, Disp: 21 Tab, Rfl: 0  •  losartan (COZAAR) 25 MG Tab, Take 2 Tabs by mouth every day., Disp: 90 Tab, Rfl: 1  •  citalopram (CELEXA) 20 MG Tab, Take 1 Tab by mouth every day., Disp: 30 Tab, Rfl: 0  •  atorvastatin (LIPITOR) 10 MG Tab, , Disp: , Rfl:   •  cyanocobalamin (VITAMIN B-12) 1000 MCG/ML Solution, INJECT 1ML INTRAMUSCULARLY EVERY 30 DAYS. DISCARD 28  DAYS AFTER FIRST USE, Disp: 3 mL, Rfl: 0  •  vitamin D, Ergocalciferol, (DRISDOL) 29477 units Cap capsule, Take 1 Cap by mouth every 7 days., Disp: 12 Cap, Rfl: 1  •  Cyanocobalamin (B-12) 2500 MCG Tab, Take 1 Tab  by mouth every day., Disp: 90 Tab, Rfl: 1  •  PNEUMOVAX 23 25 MCG/0.5ML Injection, , Disp: , Rfl:       Allergies:  Patient has no known allergies.    ROS  Constitutional: Negative for fever, chills and malaise/fatigue.   HENT: Negative for congestion and sore throat.    Eyes: Negative for blurred vision, double vision and photophobia.   Respiratory: Negative for cough and shortness of breath.  Cardiovascular: Negative for chest pain and palpitations.   Gastrointestinal: Negative for heartburn, nausea, vomiting, abdominal pain, diarrhea and constipation.   Genitourinary: Negative for dysuria and flank pain.   Musculoskeletal: POS for left neck pain and myalgias.   Skin: Negative for itching and rash.   Neurological: Negative for dizziness, tingling and headaches.   Endo/Heme/Allergies: Does not bruise/bleed easily.   Psychiatric/Behavioral: Negative for depression. The patient is not nervous/anxious.           Objective:     /92   Pulse 78   Temp 36.8 °C (98.3 °F) (Temporal)   Resp 20   Wt 113.4 kg (250 lb)   SpO2 95%   BMI 41.60 kg/m²      Physical Exam   Neck: Trachea normal, full passive range of motion without pain and phonation normal. Neck supple. Normal carotid pulses, no hepatojugular reflux and no JVD present. Spinous process tenderness and muscular tenderness present. Carotid bruit is not present. No neck rigidity. Decreased range of motion present. No edema and no erythema present. No Brudzinski's sign and no Kernig's sign noted. No thyroid mass and no thyromegaly present.              Constitutional: PT is oriented to person, place, and time. PT appears well-developed and well-nourished. No distress.   HENT:   Head: Normocephalic and atraumatic.   Mouth/Throat: Oropharynx is clear and moist. No oropharyngeal exudate.   Eyes: Conjunctivae normal and EOM are normal. Pupils are equal, round, and reactive to light.   Cardiovascular: Normal rate, regular rhythm, normal heart sounds and intact  distal pulses.  Exam reveals no gallop and no friction rub.    No murmur heard.  Pulmonary/Chest: Effort normal and breath sounds normal. No respiratory distress. PT has no wheezes. PT has no rales. Pt exhibits no tenderness.   Abdominal: Soft. Bowel sounds are normal. PT exhibits no distension and no mass. There is no tenderness. There is no rebound and no guarding.   Musculoskeletal: Normal range of motion. SEE NECK  Neurological: PT is alert and oriented to person, place, and time. PT has normal reflexes. No cranial nerve deficit.   Skin: Skin is warm and dry. No rash noted. PT is not diaphoretic. No erythema.       Psychiatric: PT has a normal mood and affect. PT behavior is normal. Judgment and thought content normal.        Assessment/Plan:     1. Strain of cervical portion of left trapezius muscle    - cyclobenzaprine (FLEXERIL) 5 MG tablet; Take 1-2 Tabs by mouth 3 times a day as needed.  Dispense: 30 Tab; Refill: 0  - methylPREDNISolone (MEDROL DOSEPAK) 4 MG Tablet Therapy Pack; Use as directed  Dispense: 21 Tab; Refill: 0    RICE therapy discussed  Gentle ROM exercises discussed  WBAT BUE  Ice/heat therapy discussed  OTC ibuprofen for pain control  Rest, fluids encouraged.  AVS with medical info given.  Pt was in full understanding and agreement with the plan.  Follow-up as needed if symptoms worsen or fail to improve.

## 2019-09-16 NOTE — PATIENT INSTRUCTIONS
Acute Torticollis  Torticollis is a condition in which the muscles of the neck tighten (contract) abnormally, causing the neck to twist and the head to move into an unnatural position. Torticollis that develops suddenly is called acute torticollis. If torticollis becomes chronic and is left untreated, the face and neck can become deformed.  CAUSES  This condition may be caused by:  · Sleeping in an awkward position (common).  · Extending or twisting the neck muscles beyond their normal position.  · Infection.  In some cases, the cause may not be known.  SYMPTOMS  Symptoms of this condition include:  · An unnatural position of the head.  · Neck pain.  · A limited ability to move the neck.  · Twisting of the neck to one side.  DIAGNOSIS  This condition is diagnosed with a physical exam. You may also have imaging tests, such as an X-ray, CT scan, or MRI.  TREATMENT  Treatment for this condition involves trying to relax the neck muscles. It may include:  · Medicines or shots.  · Physical therapy.  · Surgery. This may be done in severe cases.  HOME CARE INSTRUCTIONS  · Take medicines only as directed by your health care provider.  · Do stretching exercises and massage your neck as directed by your health care provider.  · Keep all follow-up visits as directed by your health care provider. This is important.  SEEK MEDICAL CARE IF:  · You develop a fever.  SEEK IMMEDIATE MEDICAL CARE IF:  · You develop difficulty breathing.  · You develop noisy breathing (stridor).  · You start drooling.  · You have trouble swallowing or have pain with swallowing.  · You develop numbness or weakness in your hands or feet.  · You have changes in your speech, understanding, or vision.  · Your pain gets worse.  This information is not intended to replace advice given to you by your health care provider. Make sure you discuss any questions you have with your health care provider.  Document Released: 12/15/2001 Document Revised: 04/10/2017  Document Reviewed: 12/14/2015  [employer] Patient Education © 2017 [employer]

## 2019-09-23 RX ORDER — CITALOPRAM 20 MG/1
TABLET ORAL
Qty: 30 TAB | Refills: 0 | Status: SHIPPED | OUTPATIENT
Start: 2019-09-23 | End: 2019-10-08 | Stop reason: SDUPTHER

## 2019-10-02 ENCOUNTER — HOSPITAL ENCOUNTER (OUTPATIENT)
Dept: LAB | Facility: MEDICAL CENTER | Age: 69
End: 2019-10-02
Attending: INTERNAL MEDICINE
Payer: MEDICARE

## 2019-10-02 DIAGNOSIS — E78.5 DYSLIPIDEMIA: ICD-10-CM

## 2019-10-02 DIAGNOSIS — Z11.59 NEED FOR HEPATITIS C SCREENING TEST: ICD-10-CM

## 2019-10-02 DIAGNOSIS — I10 ESSENTIAL HYPERTENSION: ICD-10-CM

## 2019-10-02 DIAGNOSIS — D64.9 ANEMIA, UNSPECIFIED TYPE: ICD-10-CM

## 2019-10-02 DIAGNOSIS — D51.0 PERNICIOUS ANEMIA: ICD-10-CM

## 2019-10-02 DIAGNOSIS — R73.01 IFG (IMPAIRED FASTING GLUCOSE): ICD-10-CM

## 2019-10-02 DIAGNOSIS — E55.9 HYPOVITAMINOSIS D: ICD-10-CM

## 2019-10-02 LAB
25(OH)D3 SERPL-MCNC: 50 NG/ML (ref 30–100)
ALBUMIN SERPL BCP-MCNC: 4.3 G/DL (ref 3.2–4.9)
ALBUMIN/GLOB SERPL: 1.5 G/DL
ALP SERPL-CCNC: 108 U/L (ref 30–99)
ALT SERPL-CCNC: 15 U/L (ref 2–50)
ANION GAP SERPL CALC-SCNC: 7 MMOL/L (ref 0–11.9)
ANISOCYTOSIS BLD QL SMEAR: ABNORMAL
AST SERPL-CCNC: 19 U/L (ref 12–45)
BASOPHILS # BLD AUTO: 1.5 % (ref 0–1.8)
BASOPHILS # BLD: 0.09 K/UL (ref 0–0.12)
BILIRUB SERPL-MCNC: 0.4 MG/DL (ref 0.1–1.5)
BUN SERPL-MCNC: 10 MG/DL (ref 8–22)
CALCIUM SERPL-MCNC: 9.2 MG/DL (ref 8.5–10.5)
CHLORIDE SERPL-SCNC: 104 MMOL/L (ref 96–112)
CHOLEST SERPL-MCNC: 177 MG/DL (ref 100–199)
CO2 SERPL-SCNC: 25 MMOL/L (ref 20–33)
COMMENT 1642: NORMAL
CREAT SERPL-MCNC: 0.56 MG/DL (ref 0.5–1.4)
EOSINOPHIL # BLD AUTO: 0.18 K/UL (ref 0–0.51)
EOSINOPHIL NFR BLD: 3 % (ref 0–6.9)
ERYTHROCYTE [DISTWIDTH] IN BLOOD BY AUTOMATED COUNT: 44.4 FL (ref 35.9–50)
EST. AVERAGE GLUCOSE BLD GHB EST-MCNC: 108 MG/DL
FASTING STATUS PATIENT QL REPORTED: NORMAL
FERRITIN SERPL-MCNC: 4.9 NG/ML (ref 10–291)
GLOBULIN SER CALC-MCNC: 2.8 G/DL (ref 1.9–3.5)
GLUCOSE SERPL-MCNC: 97 MG/DL (ref 65–99)
HBA1C MFR BLD: 5.4 % (ref 0–5.6)
HCT VFR BLD AUTO: 39 % (ref 37–47)
HCV AB SER QL: NEGATIVE
HDLC SERPL-MCNC: 45 MG/DL
HGB BLD-MCNC: 11.3 G/DL (ref 12–16)
HYPOCHROMIA BLD QL SMEAR: ABNORMAL
IMM GRANULOCYTES # BLD AUTO: 0.01 K/UL (ref 0–0.11)
IMM GRANULOCYTES NFR BLD AUTO: 0.2 % (ref 0–0.9)
IRON SATN MFR SERPL: 7 % (ref 15–55)
IRON SERPL-MCNC: 32 UG/DL (ref 40–170)
LDLC SERPL CALC-MCNC: 95 MG/DL
LYMPHOCYTES # BLD AUTO: 1.86 K/UL (ref 1–4.8)
LYMPHOCYTES NFR BLD: 30.7 % (ref 22–41)
MCH RBC QN AUTO: 20.7 PG (ref 27–33)
MCHC RBC AUTO-ENTMCNC: 29 G/DL (ref 33.6–35)
MCV RBC AUTO: 71.6 FL (ref 81.4–97.8)
MICROCYTES BLD QL SMEAR: ABNORMAL
MONOCYTES # BLD AUTO: 0.56 K/UL (ref 0–0.85)
MONOCYTES NFR BLD AUTO: 9.2 % (ref 0–13.4)
MORPHOLOGY BLD-IMP: NORMAL
NEUTROPHILS # BLD AUTO: 3.36 K/UL (ref 2–7.15)
NEUTROPHILS NFR BLD: 55.4 % (ref 44–72)
NRBC # BLD AUTO: 0 K/UL
NRBC BLD-RTO: 0 /100 WBC
OVALOCYTES BLD QL SMEAR: NORMAL
PLATELET # BLD AUTO: 447 K/UL (ref 164–446)
PLATELET BLD QL SMEAR: NORMAL
PMV BLD AUTO: 10 FL (ref 9–12.9)
POIKILOCYTOSIS BLD QL SMEAR: NORMAL
POTASSIUM SERPL-SCNC: 3.8 MMOL/L (ref 3.6–5.5)
PROT SERPL-MCNC: 7.1 G/DL (ref 6–8.2)
RBC # BLD AUTO: 5.45 M/UL (ref 4.2–5.4)
RBC BLD AUTO: PRESENT
SODIUM SERPL-SCNC: 136 MMOL/L (ref 135–145)
TIBC SERPL-MCNC: 491 UG/DL (ref 250–450)
TRIGL SERPL-MCNC: 184 MG/DL (ref 0–149)
VIT B12 SERPL-MCNC: 764 PG/ML (ref 211–911)
WBC # BLD AUTO: 6.1 K/UL (ref 4.8–10.8)

## 2019-10-02 PROCEDURE — 80053 COMPREHEN METABOLIC PANEL: CPT

## 2019-10-02 PROCEDURE — 80061 LIPID PANEL: CPT

## 2019-10-02 PROCEDURE — 85025 COMPLETE CBC W/AUTO DIFF WBC: CPT

## 2019-10-02 PROCEDURE — 83036 HEMOGLOBIN GLYCOSYLATED A1C: CPT | Mod: GA

## 2019-10-02 PROCEDURE — 36415 COLL VENOUS BLD VENIPUNCTURE: CPT

## 2019-10-02 PROCEDURE — 83550 IRON BINDING TEST: CPT

## 2019-10-02 PROCEDURE — 86803 HEPATITIS C AB TEST: CPT

## 2019-10-02 PROCEDURE — 83540 ASSAY OF IRON: CPT

## 2019-10-02 PROCEDURE — 82607 VITAMIN B-12: CPT

## 2019-10-02 PROCEDURE — 82728 ASSAY OF FERRITIN: CPT

## 2019-10-02 PROCEDURE — 82306 VITAMIN D 25 HYDROXY: CPT

## 2019-10-08 ENCOUNTER — OFFICE VISIT (OUTPATIENT)
Dept: MEDICAL GROUP | Facility: MEDICAL CENTER | Age: 69
End: 2019-10-08
Payer: MEDICARE

## 2019-10-08 VITALS
BODY MASS INDEX: 41.47 KG/M2 | TEMPERATURE: 98.7 F | HEART RATE: 86 BPM | SYSTOLIC BLOOD PRESSURE: 140 MMHG | WEIGHT: 248.9 LBS | OXYGEN SATURATION: 96 % | HEIGHT: 65 IN | DIASTOLIC BLOOD PRESSURE: 90 MMHG

## 2019-10-08 DIAGNOSIS — E78.5 DYSLIPIDEMIA: ICD-10-CM

## 2019-10-08 DIAGNOSIS — F41.1 GAD (GENERALIZED ANXIETY DISORDER): ICD-10-CM

## 2019-10-08 DIAGNOSIS — K29.40 ATROPHIC GASTRITIS WITHOUT HEMORRHAGE: ICD-10-CM

## 2019-10-08 DIAGNOSIS — D50.9 MICROCYTIC ANEMIA: ICD-10-CM

## 2019-10-08 DIAGNOSIS — I10 ESSENTIAL HYPERTENSION: ICD-10-CM

## 2019-10-08 DIAGNOSIS — D51.0 PERNICIOUS ANEMIA: ICD-10-CM

## 2019-10-08 DIAGNOSIS — F32.5 MAJOR DEPRESSION IN REMISSION (HCC): ICD-10-CM

## 2019-10-08 DIAGNOSIS — D75.839 THROMBOCYTOSIS: ICD-10-CM

## 2019-10-08 DIAGNOSIS — Z86.59 HISTORY OF PANIC ATTACKS: ICD-10-CM

## 2019-10-08 PROCEDURE — 99214 OFFICE O/P EST MOD 30 MIN: CPT | Performed by: INTERNAL MEDICINE

## 2019-10-08 RX ORDER — CYANOCOBALAMIN 1000 UG/ML
1000 INJECTION, SOLUTION INTRAMUSCULAR; SUBCUTANEOUS
Qty: 3 ML | Refills: 1 | Status: SHIPPED | OUTPATIENT
Start: 2019-10-08 | End: 2020-01-06

## 2019-10-08 RX ORDER — CYANOCOBALAMIN 1000 UG/ML
1000 INJECTION, SOLUTION INTRAMUSCULAR; SUBCUTANEOUS ONCE
Status: COMPLETED | OUTPATIENT
Start: 2019-10-08 | End: 2019-10-08

## 2019-10-08 RX ORDER — CITALOPRAM 20 MG/1
TABLET ORAL
Qty: 90 TAB | Refills: 1 | Status: SHIPPED | OUTPATIENT
Start: 2019-10-08 | End: 2019-12-24 | Stop reason: SDUPTHER

## 2019-10-08 RX ADMIN — CYANOCOBALAMIN 1000 MCG: 1000 INJECTION, SOLUTION INTRAMUSCULAR; SUBCUTANEOUS at 10:52

## 2019-10-08 ASSESSMENT — PATIENT HEALTH QUESTIONNAIRE - PHQ9
SUM OF ALL RESPONSES TO PHQ9 QUESTIONS 1 AND 2: 0
1. LITTLE INTEREST OR PLEASURE IN DOING THINGS: 0
2. FEELING DOWN, DEPRESSED, IRRITABLE, OR HOPELESS: 0

## 2019-10-08 NOTE — PROGRESS NOTES
CC: Mini Pham is a 69 y.o. female who presents for the following     Hypertension  Meds: Irbesartan 300 mg QD, HCTZ, 25 mg daily, taking as prescribed.   Measuring BP at home: no  Denies: - headaches, vision problems, tinnitus.  - chest pain/pressure, palpitations, irregular heart beats, exertional, dyspnea, peripheral edema.  Low salt diet: advised  Diet: regular  Exercise: limited  BMI: 41  FH of HTN: unknown  Reviewed renal panel.     Dyslipidemia  The patient is on atorvastatin, 10 mg, taking daily, as prescribed. No myalgias, muscle cramps or pain.   Diet /Exercise/BMI: As above  FH: sister     Thrombocytosis, Atrophic gastritis / Pernicious anemia  The patient was diagnosed with atrophic gastritis on EGD  Labs showed microcytic anemia, controlled/normal B12 level, high PLT count.  Placed on vitamin B12 OTC, improved levels of B12 and CBC.  Reviewed labs.    Anxiety, h/o panic attacks, depression  Onset:  Since her  20'  Course: stable  Mood/anxiety currently does not affect: daily activities/sleep.  Current treatment: celexa     Risk factors:   · Depression, anxiety  · H/o phobia: no  · H/o panic attacks: no  · H/o hypomanic or manic episode: no  · Substance abuse  (alcohol,  prescription drugs caffeine, tobacco): no  · Family support: yes  · Living alone:  no  · Family history of psych disorders: sisters x 2  · Stress: no  · PMH of abuse (sexual, physical, emotional abuse; neglect): no      LUCY-7 score:  10/19   1. Feeling nervous, anxious, or on edge            0   2. Not being able to stop or control worrying 0   3. Worrying too much about different things 0   4. Trouble relaxing 0   5. Being so restless that it is hard to sit still 0   6. Becoming easily annoyed or irritable 0   7. Feeling afraid as if something awful might happen 0   Total score 0     Suicidal ideation: no    Depression Screen (PHQ-2/PHQ-9) 2/25/2019 10/8/2019   PHQ-2 Total Score - 0   PHQ-2 Total Score 0 -     Current Outpatient  Medications   Medication Sig Dispense Refill   • citalopram (CELEXA) 20 MG Tab TAKE 1 TABLET DAILY 30 Tab 0   • cyanocobalamin (VITAMIN B-12) 1000 MCG/ML Solution INJECT 1ML INTRAMUSCULARLY EVERY 30 DAYS. DISCARD 28  DAYS AFTER FIRST USE 3 mL 0   • losartan (COZAAR) 25 MG Tab Take 2 Tabs by mouth every day. 90 Tab 1   • vitamin D, Ergocalciferol, (DRISDOL) 66475 units Cap capsule Take 1 Cap by mouth every 7 days. 12 Cap 1   • Cyanocobalamin (B-12) 2500 MCG Tab Take 1 Tab by mouth every day. 90 Tab 1   • atorvastatin (LIPITOR) 10 MG Tab      • cyclobenzaprine (FLEXERIL) 5 MG tablet Take 1-2 Tabs by mouth 3 times a day as needed. (Patient not taking: Reported on 10/8/2019) 30 Tab 0   • methylPREDNISolone (MEDROL DOSEPAK) 4 MG Tablet Therapy Pack Use as directed (Patient not taking: Reported on 10/8/2019) 21 Tab 0   • PNEUMOVAX 23 25 MCG/0.5ML Injection        Current Facility-Administered Medications   Medication Dose Route Frequency Provider Last Rate Last Dose   • cyanocobalamin (VITAMIN B-12) injection 1,000 mcg  1,000 mcg Intramuscular Once Adama Rogers M.D.         She  has a past medical history of Anemia, pernicious, Anxiety, Atrophic gastritis, Hyperlipidemia, Hypertension, Osteoarthritis, Prediabetes, and Tendonitis.  She  has a past surgical history that includes cholecystectomy ().  Social History     Tobacco Use   • Smoking status: Former Smoker     Packs/day: 1.00     Years: 30.00     Pack years: 30.00     Types: Cigarettes     Last attempt to quit:      Years since quittin.7   • Smokeless tobacco: Never Used   Substance Use Topics   • Alcohol use: Yes     Alcohol/week: 0.6 oz     Types: 1 Glasses of wine per week     Comment: 3 times/week   • Drug use: Yes     Types: Cocaine, Marijuana     Comment: Remote, 50 years ago     Social History     Social History Narrative   • Not on file     Family History   Problem Relation Age of Onset   • Alzheimer's Disease Mother 50        early  "onset   • Cancer Father 50        laryngeal, mets to bladder   • Heart Attack Sister 56   • Diabetes Sister    • Hyperlipidemia Sister    • Cancer Maternal Aunt 50        colon   • Colon Cancer Sister 69   • Hypertension Neg Hx      Family Status   Relation Name Status   • Mo  (Not Specified)   • Fa  (Not Specified)   • Sis  (Not Specified)   • MAunt  (Not Specified)   • Sis  (Not Specified)   • Neg Hx  (Not Specified)     ROS   Taking supplements to help mood or symptoms: yes  Using alcohol or other substances to help mood or symptoms: no  No polydipsia, polyuria.  No temperature intolerance.  No bowel changes  Denies symptoms of matthew: grandiosity, euphoria, need for little or no sleep, rapid pressured speech, spending sprees, reckless or risky behavior, hypersexual behavior.   No visual or auditory hallucinations.    PHYSICAL EXAM   Blood Pressure 140/90   Pulse 86   Temperature 37.1 °C (98.7 °F) (Temporal)   Height 1.657 m (5' 5.25\")   Weight 112.9 kg (248 lb 14.4 oz)   Oxygen Saturation 96%   General: normal speech pattern and content   Clothing and grooming normal.  Behavior: no psychomotor abnormalities or impulsivity  Eye contact: good   Affect: normal  Though content: normal insight and reasoning, no evidence of psychotic process.   Neck/Thyroid: No adenopathy, no palpable thyroid nodules.  Lungs: CTAB  Heart: RRR no ectopy  Neuro: Gait normal. Reflexes normal and symmetric. Sensation grossly intact        Abnormal findings: none    Labs     Labs are reviewed and discussed with a patient  Lab Results   Component Value Date/Time    CHOLSTRLTOT 177 10/02/2019 08:42 AM    LDL 95 10/02/2019 08:42 AM    HDL 45 10/02/2019 08:42 AM    TRIGLYCERIDE 184 (H) 10/02/2019 08:42 AM       Lab Results   Component Value Date/Time    SODIUM 136 10/02/2019 08:42 AM    POTASSIUM 3.8 10/02/2019 08:42 AM    CHLORIDE 104 10/02/2019 08:42 AM    CO2 25 10/02/2019 08:42 AM    GLUCOSE 97 10/02/2019 08:42 AM    BUN 10 10/02/2019 " 08:42 AM    CREATININE 0.56 10/02/2019 08:42 AM     Lab Results   Component Value Date/Time    ALKPHOSPHAT 108 (H) 10/02/2019 08:42 AM    ASTSGOT 19 10/02/2019 08:42 AM    ALTSGPT 15 10/02/2019 08:42 AM    TBILIRUBIN 0.4 10/02/2019 08:42 AM      Lab Results   Component Value Date/Time    HBA1C 5.4 10/02/2019 08:42 AM    HBA1C 5.7 (H) 03/12/2019 10:41 AM      Ref. Range 3/13/2019 6/26/2019 10/2/2019   25-OH   Vit D 25  30 - 100 ng/mL  22 (L) 50   Ferritin 10.0 - 291.0 ng/mL   4.9 (L)   Folate -  >4.0 ng/mL 10.4     Vitamin B12  211 - 911 pg/mL 675  764     Lab Results   Component Value Date/Time    WBC 6.1 10/02/2019 08:42 AM    RBC 5.45 (H) 10/02/2019 08:42 AM    HEMOGLOBIN 11.3 (L) 10/02/2019 08:42 AM    HEMATOCRIT 39.0 10/02/2019 08:42 AM    MCV 71.6 (L) 10/02/2019 08:42 AM    MCH 20.7 (L) 10/02/2019 08:42 AM    MCHC 29.0 (L) 10/02/2019 08:42 AM    MPV 10.0 10/02/2019 08:42 AM    NEUTSPOLYS 55.40 10/02/2019 08:42 AM    LYMPHOCYTES 30.70 10/02/2019 08:42 AM    MONOCYTES 9.20 10/02/2019 08:42 AM    EOSINOPHILS 3.00 10/02/2019 08:42 AM    BASOPHILS 1.50 10/02/2019 08:42 AM    HYPOCHROMIA 1+ 10/02/2019 08:42 AM    ANISOCYTOSIS 1+ 10/02/2019 08:42 AM      Imaging     None    ASSESMENT AND PLAN     1. Essential hypertension  Controlled, continue current treatment  - Comp Metabolic Panel; Future    2. Dyslipidemia  Controlled, continue current treatment    3. Thrombocytosis (HCC)  The patient is postmenopausal, diagnosed with atrophic gastritis/pernicious anemia, controlled with B12 supplement;   -She should not have microcytic anemia and thrombocytosis  - REFERRAL TO HEMATOLOGY ONCOLOGY Referral to? Other (ANY)  4. Atrophic gastritis without hemorrhage  - INTRINSIC FACTOR AB; Future  - REFERRAL TO HEMATOLOGY ONCOLOGY Referral to? Other (ANY)  5. Microcytic anemia  - OCCULT BLOOD FECES IMMUNOASSAY; Future  - REFERRAL TO HEMATOLOGY ONCOLOGY Referral to? Other (ANY)  - CBC WITH DIFFERENTIAL; Future  6. Pernicious anemia  -  VITAMIN B12; Future  - cyanocobalamin (VITAMIN B-12) injection 1,000 mcg  - VITAMIN B12; Future  - cyanocobalamin (VITAMIN B-12) 1000 MCG/ML Solution; 1 mL by Intramuscular route every 30 days for 90 days.  Dispense: 3 mL; Refill: 1    7. LUCY (generalized anxiety disorder)  Controlled, continue current treatment  - citalopram (CELEXA) 20 MG Tab; TAKE 1 TABLET DAILY  Dispense: 90 Tab; Refill: 1  8. History of panic attacks  9. Major depression in remission (HCC)  As above    10. BMI 40.0-44.9, adult (HCC)  - OBESITY COUNSELING (No Charge): Patient identified as having weight management issue.  Appropriate orders and counseling given.    Smoking Counseling: Nonsmoker    Follow up: in 3 months with labs, earlier prn    Please note that this dictation was created using voice recognition software. Despite all efforts, some minor grammar mistakes are possible.

## 2019-10-30 RX ORDER — ATORVASTATIN CALCIUM 10 MG/1
10 TABLET, FILM COATED ORAL DAILY
Qty: 90 TAB | Refills: 1 | Status: SHIPPED | OUTPATIENT
Start: 2019-10-30 | End: 2019-11-14 | Stop reason: SDUPTHER

## 2019-11-13 ENCOUNTER — PATIENT MESSAGE (OUTPATIENT)
Dept: MEDICAL GROUP | Facility: MEDICAL CENTER | Age: 69
End: 2019-11-13

## 2019-11-14 RX ORDER — LOSARTAN POTASSIUM 25 MG/1
50 TABLET ORAL DAILY
Qty: 90 TAB | Refills: 0 | Status: SHIPPED | OUTPATIENT
Start: 2019-11-14 | End: 2020-01-02

## 2019-11-14 RX ORDER — ATORVASTATIN CALCIUM 10 MG/1
10 TABLET, FILM COATED ORAL DAILY
Qty: 90 TAB | Refills: 1 | Status: SHIPPED | OUTPATIENT
Start: 2019-11-14 | End: 2020-02-13

## 2019-11-14 NOTE — TELEPHONE ENCOUNTER
Requesting refill to go to [facility] pharmacy          Was the patient seen in the last year in this department? Yes    Does patient have an active prescription for medications requested? No     Received Request Via: Pharmacy

## 2019-12-12 ENCOUNTER — HOSPITAL ENCOUNTER (OUTPATIENT)
Dept: LAB | Facility: MEDICAL CENTER | Age: 69
End: 2019-12-12
Attending: INTERNAL MEDICINE
Payer: MEDICARE

## 2019-12-12 PROCEDURE — 82728 ASSAY OF FERRITIN: CPT

## 2019-12-12 PROCEDURE — 85046 RETICYTE/HGB CONCENTRATE: CPT

## 2019-12-12 PROCEDURE — 36415 COLL VENOUS BLD VENIPUNCTURE: CPT

## 2019-12-12 PROCEDURE — 80053 COMPREHEN METABOLIC PANEL: CPT

## 2019-12-12 PROCEDURE — 82746 ASSAY OF FOLIC ACID SERUM: CPT

## 2019-12-12 PROCEDURE — 83615 LACTATE (LD) (LDH) ENZYME: CPT

## 2019-12-12 PROCEDURE — 83516 IMMUNOASSAY NONANTIBODY: CPT

## 2019-12-12 PROCEDURE — 82607 VITAMIN B-12: CPT

## 2019-12-13 LAB
ALBUMIN SERPL BCP-MCNC: 4.1 G/DL (ref 3.2–4.9)
ALBUMIN/GLOB SERPL: 1.6 G/DL
ALP SERPL-CCNC: 109 U/L (ref 30–99)
ALT SERPL-CCNC: 15 U/L (ref 2–50)
ANION GAP SERPL CALC-SCNC: 7 MMOL/L (ref 0–11.9)
AST SERPL-CCNC: 15 U/L (ref 12–45)
BILIRUB SERPL-MCNC: 0.2 MG/DL (ref 0.1–1.5)
BUN SERPL-MCNC: 12 MG/DL (ref 8–22)
CALCIUM SERPL-MCNC: 9 MG/DL (ref 8.5–10.5)
CHLORIDE SERPL-SCNC: 106 MMOL/L (ref 96–112)
CO2 SERPL-SCNC: 26 MMOL/L (ref 20–33)
CREAT SERPL-MCNC: 0.63 MG/DL (ref 0.5–1.4)
FERRITIN SERPL-MCNC: 4.2 NG/ML (ref 10–291)
FOLATE SERPL-MCNC: 13 NG/ML
GLOBULIN SER CALC-MCNC: 2.5 G/DL (ref 1.9–3.5)
GLUCOSE SERPL-MCNC: 90 MG/DL (ref 65–99)
HGB RETIC QN AUTO: 21.5 PG/CELL (ref 29–35)
IMM RETICS NFR: 20 % (ref 9.3–17.4)
LDH SERPL L TO P-CCNC: 179 U/L (ref 107–266)
POTASSIUM SERPL-SCNC: 3.9 MMOL/L (ref 3.6–5.5)
PROT SERPL-MCNC: 6.6 G/DL (ref 6–8.2)
RETICS # AUTO: 0.06 M/UL (ref 0.04–0.06)
RETICS/RBC NFR: 1.1 % (ref 0.8–2.1)
SODIUM SERPL-SCNC: 139 MMOL/L (ref 135–145)
VIT B12 SERPL-MCNC: >1500 PG/ML (ref 211–911)

## 2019-12-14 LAB — PCA IGG SER-ACNC: 25.7 UNITS (ref 0–24.9)

## 2019-12-24 DIAGNOSIS — F41.1 GAD (GENERALIZED ANXIETY DISORDER): ICD-10-CM

## 2019-12-24 DIAGNOSIS — F32.5 MAJOR DEPRESSION IN REMISSION (HCC): ICD-10-CM

## 2019-12-24 DIAGNOSIS — Z86.59 HISTORY OF PANIC ATTACKS: ICD-10-CM

## 2019-12-26 RX ORDER — CITALOPRAM 20 MG/1
TABLET ORAL
Qty: 90 TAB | Refills: 0 | Status: SHIPPED | OUTPATIENT
Start: 2019-12-26 | End: 2020-02-13

## 2020-01-02 RX ORDER — LOSARTAN POTASSIUM 25 MG/1
TABLET ORAL
Qty: 90 TAB | Refills: 0 | Status: SHIPPED | OUTPATIENT
Start: 2020-01-02 | End: 2020-02-13

## 2020-01-29 ENCOUNTER — APPOINTMENT (OUTPATIENT)
Dept: MEDICAL GROUP | Facility: MEDICAL CENTER | Age: 70
End: 2020-01-29
Payer: MEDICARE

## 2020-02-05 ENCOUNTER — HOSPITAL ENCOUNTER (OUTPATIENT)
Facility: MEDICAL CENTER | Age: 70
End: 2020-02-05
Attending: INTERNAL MEDICINE
Payer: MEDICARE

## 2020-02-05 PROCEDURE — 82274 ASSAY TEST FOR BLOOD FECAL: CPT

## 2020-02-06 DIAGNOSIS — D50.9 MICROCYTIC ANEMIA: ICD-10-CM

## 2020-02-06 LAB — HEMOCCULT STL QL IA: NEGATIVE

## 2020-02-07 ENCOUNTER — HOSPITAL ENCOUNTER (OUTPATIENT)
Dept: LAB | Facility: MEDICAL CENTER | Age: 70
End: 2020-02-07
Attending: INTERNAL MEDICINE
Payer: MEDICARE

## 2020-02-07 DIAGNOSIS — D51.0 PERNICIOUS ANEMIA: ICD-10-CM

## 2020-02-07 DIAGNOSIS — D50.9 MICROCYTIC ANEMIA: ICD-10-CM

## 2020-02-07 DIAGNOSIS — K29.40 ATROPHIC GASTRITIS WITHOUT HEMORRHAGE: ICD-10-CM

## 2020-02-07 DIAGNOSIS — I10 ESSENTIAL HYPERTENSION: ICD-10-CM

## 2020-02-07 LAB
ALBUMIN SERPL BCP-MCNC: 4.5 G/DL (ref 3.2–4.9)
ALBUMIN/GLOB SERPL: 1.7 G/DL
ALP SERPL-CCNC: 119 U/L (ref 30–99)
ALT SERPL-CCNC: 15 U/L (ref 2–50)
ANION GAP SERPL CALC-SCNC: 9 MMOL/L (ref 0–11.9)
ANISOCYTOSIS BLD QL SMEAR: ABNORMAL
AST SERPL-CCNC: 12 U/L (ref 12–45)
BASOPHILS # BLD AUTO: 3.5 % (ref 0–1.8)
BASOPHILS # BLD: 0.19 K/UL (ref 0–0.12)
BILIRUB SERPL-MCNC: 0.5 MG/DL (ref 0.1–1.5)
BUN SERPL-MCNC: 5 MG/DL (ref 8–22)
CALCIUM SERPL-MCNC: 9.3 MG/DL (ref 8.5–10.5)
CHLORIDE SERPL-SCNC: 104 MMOL/L (ref 96–112)
CO2 SERPL-SCNC: 26 MMOL/L (ref 20–33)
CREAT SERPL-MCNC: 0.59 MG/DL (ref 0.5–1.4)
EOSINOPHIL # BLD AUTO: 0.09 K/UL (ref 0–0.51)
EOSINOPHIL NFR BLD: 1.7 % (ref 0–6.9)
FASTING STATUS PATIENT QL REPORTED: NORMAL
GLOBULIN SER CALC-MCNC: 2.7 G/DL (ref 1.9–3.5)
GLUCOSE SERPL-MCNC: 95 MG/DL (ref 65–99)
HCT VFR BLD AUTO: 47.6 % (ref 37–47)
HGB BLD-MCNC: 14.5 G/DL (ref 12–16)
LYMPHOCYTES # BLD AUTO: 1.44 K/UL (ref 1–4.8)
LYMPHOCYTES NFR BLD: 26.1 % (ref 22–41)
MANUAL DIFF BLD: NORMAL
MCH RBC QN AUTO: 24.2 PG (ref 27–33)
MCHC RBC AUTO-ENTMCNC: 30.5 G/DL (ref 33.6–35)
MCV RBC AUTO: 79.6 FL (ref 81.4–97.8)
MICROCYTES BLD QL SMEAR: ABNORMAL
MONOCYTES # BLD AUTO: 0.29 K/UL (ref 0–0.85)
MONOCYTES NFR BLD AUTO: 5.2 % (ref 0–13.4)
MORPHOLOGY BLD-IMP: NORMAL
NEUTROPHILS # BLD AUTO: 3.49 K/UL (ref 2–7.15)
NEUTROPHILS NFR BLD: 63.5 % (ref 44–72)
NRBC # BLD AUTO: 0 K/UL
NRBC BLD-RTO: 0 /100 WBC
OVALOCYTES BLD QL SMEAR: NORMAL
PLATELET # BLD AUTO: 362 K/UL (ref 164–446)
PLATELET BLD QL SMEAR: NORMAL
PMV BLD AUTO: 9.5 FL (ref 9–12.9)
POIKILOCYTOSIS BLD QL SMEAR: NORMAL
POTASSIUM SERPL-SCNC: 3.8 MMOL/L (ref 3.6–5.5)
PROT SERPL-MCNC: 7.2 G/DL (ref 6–8.2)
RBC # BLD AUTO: 5.98 M/UL (ref 4.2–5.4)
RBC BLD AUTO: PRESENT
SODIUM SERPL-SCNC: 139 MMOL/L (ref 135–145)
VIT B12 SERPL-MCNC: 573 PG/ML (ref 211–911)
WBC # BLD AUTO: 5.5 K/UL (ref 4.8–10.8)

## 2020-02-07 PROCEDURE — 85027 COMPLETE CBC AUTOMATED: CPT

## 2020-02-07 PROCEDURE — 80053 COMPREHEN METABOLIC PANEL: CPT

## 2020-02-07 PROCEDURE — 85007 BL SMEAR W/DIFF WBC COUNT: CPT

## 2020-02-07 PROCEDURE — 82607 VITAMIN B-12: CPT

## 2020-02-07 PROCEDURE — 86340 INTRINSIC FACTOR ANTIBODY: CPT

## 2020-02-07 PROCEDURE — 36415 COLL VENOUS BLD VENIPUNCTURE: CPT

## 2020-02-09 LAB — IF BLOCK AB SER QL RIA: NEGATIVE

## 2020-02-13 ENCOUNTER — OFFICE VISIT (OUTPATIENT)
Dept: MEDICAL GROUP | Facility: MEDICAL CENTER | Age: 70
End: 2020-02-13
Payer: MEDICARE

## 2020-02-13 VITALS
BODY MASS INDEX: 40.4 KG/M2 | OXYGEN SATURATION: 96 % | SYSTOLIC BLOOD PRESSURE: 126 MMHG | DIASTOLIC BLOOD PRESSURE: 78 MMHG | HEIGHT: 65 IN | HEART RATE: 72 BPM | TEMPERATURE: 98.2 F | RESPIRATION RATE: 14 BRPM | WEIGHT: 242.51 LBS

## 2020-02-13 DIAGNOSIS — M17.0 PRIMARY OSTEOARTHRITIS OF BOTH KNEES: ICD-10-CM

## 2020-02-13 DIAGNOSIS — R73.01 IFG (IMPAIRED FASTING GLUCOSE): ICD-10-CM

## 2020-02-13 DIAGNOSIS — F32.5 MAJOR DEPRESSION IN REMISSION (HCC): ICD-10-CM

## 2020-02-13 DIAGNOSIS — I10 ESSENTIAL HYPERTENSION: ICD-10-CM

## 2020-02-13 DIAGNOSIS — E55.9 HYPOVITAMINOSIS D: ICD-10-CM

## 2020-02-13 DIAGNOSIS — D50.9 MICROCYTIC ANEMIA: ICD-10-CM

## 2020-02-13 DIAGNOSIS — Z12.31 ENCOUNTER FOR SCREENING MAMMOGRAM FOR MALIGNANT NEOPLASM OF BREAST: ICD-10-CM

## 2020-02-13 DIAGNOSIS — D75.839 THROMBOCYTOSIS: ICD-10-CM

## 2020-02-13 DIAGNOSIS — R74.8 ELEVATED ALKALINE PHOSPHATASE LEVEL: ICD-10-CM

## 2020-02-13 DIAGNOSIS — D51.0 PERNICIOUS ANEMIA: ICD-10-CM

## 2020-02-13 DIAGNOSIS — Z86.59 HISTORY OF PANIC ATTACKS: ICD-10-CM

## 2020-02-13 DIAGNOSIS — Z00.00 MEDICARE ANNUAL WELLNESS VISIT, INITIAL: ICD-10-CM

## 2020-02-13 DIAGNOSIS — E78.5 DYSLIPIDEMIA: ICD-10-CM

## 2020-02-13 DIAGNOSIS — F41.1 GAD (GENERALIZED ANXIETY DISORDER): ICD-10-CM

## 2020-02-13 DIAGNOSIS — Z00.00 HEALTH CARE MAINTENANCE: ICD-10-CM

## 2020-02-13 DIAGNOSIS — R74.8 ELEVATED ALKALINE PHOSPHATASE IN NEWBORN: ICD-10-CM

## 2020-02-13 DIAGNOSIS — Z23 NEED FOR VACCINATION: ICD-10-CM

## 2020-02-13 DIAGNOSIS — K29.40 ATROPHIC GASTRITIS WITHOUT HEMORRHAGE: ICD-10-CM

## 2020-02-13 PROBLEM — Z12.39 SCREENING FOR MALIGNANT NEOPLASM OF BREAST: Status: RESOLVED | Noted: 2019-02-25 | Resolved: 2020-02-13

## 2020-02-13 PROCEDURE — G0439 PPPS, SUBSEQ VISIT: HCPCS | Performed by: INTERNAL MEDICINE

## 2020-02-13 PROCEDURE — 99214 OFFICE O/P EST MOD 30 MIN: CPT | Mod: 25 | Performed by: INTERNAL MEDICINE

## 2020-02-13 RX ORDER — ATORVASTATIN CALCIUM 10 MG/1
10 TABLET, FILM COATED ORAL DAILY
Qty: 90 TAB | Refills: 1 | Status: SHIPPED | OUTPATIENT
Start: 2020-02-13 | End: 2020-02-25 | Stop reason: SDUPTHER

## 2020-02-13 RX ORDER — CYANOCOBALAMIN 1000 UG/ML
1000 INJECTION, SOLUTION INTRAMUSCULAR; SUBCUTANEOUS ONCE
Status: COMPLETED | OUTPATIENT
Start: 2020-02-13 | End: 2020-02-13

## 2020-02-13 RX ORDER — ERGOCALCIFEROL 1.25 MG/1
50000 CAPSULE ORAL
Qty: 12 CAP | Refills: 0 | Status: SHIPPED | OUTPATIENT
Start: 2020-02-13 | End: 2020-02-25 | Stop reason: SDUPTHER

## 2020-02-13 RX ORDER — LOSARTAN POTASSIUM 25 MG/1
TABLET ORAL
Qty: 90 TAB | Refills: 0 | Status: SHIPPED | OUTPATIENT
Start: 2020-02-13 | End: 2020-02-25 | Stop reason: SDUPTHER

## 2020-02-13 RX ORDER — CITALOPRAM 20 MG/1
TABLET ORAL
Qty: 90 TAB | Refills: 0 | Status: SHIPPED | OUTPATIENT
Start: 2020-02-13 | End: 2020-02-25 | Stop reason: SDUPTHER

## 2020-02-13 RX ADMIN — CYANOCOBALAMIN 1000 MCG: 1000 INJECTION, SOLUTION INTRAMUSCULAR; SUBCUTANEOUS at 11:42

## 2020-02-13 ASSESSMENT — ANXIETY QUESTIONNAIRES
5. BEING SO RESTLESS THAT IT IS HARD TO SIT STILL: NOT AT ALL
GAD7 TOTAL SCORE: 0
7. FEELING AFRAID AS IF SOMETHING AWFUL MIGHT HAPPEN: NOT AT ALL
6. BECOMING EASILY ANNOYED OR IRRITABLE: NOT AT ALL
2. NOT BEING ABLE TO STOP OR CONTROL WORRYING: NOT AT ALL
3. WORRYING TOO MUCH ABOUT DIFFERENT THINGS: NOT AT ALL
1. FEELING NERVOUS, ANXIOUS, OR ON EDGE: NOT AT ALL
4. TROUBLE RELAXING: NOT AT ALL

## 2020-02-13 ASSESSMENT — PATIENT HEALTH QUESTIONNAIRE - PHQ9
6. FEELING BAD ABOUT YOURSELF - OR THAT YOU ARE A FAILURE OR HAVE LET YOURSELF OR YOUR FAMILY DOWN: NOT AL ALL
9. THOUGHTS THAT YOU WOULD BE BETTER OFF DEAD, OR OF HURTING YOURSELF: NOT AT ALL
8. MOVING OR SPEAKING SO SLOWLY THAT OTHER PEOPLE COULD HAVE NOTICED. OR THE OPPOSITE, BEING SO FIGETY OR RESTLESS THAT YOU HAVE BEEN MOVING AROUND A LOT MORE THAN USUAL: NOT AT ALL
SUM OF ALL RESPONSES TO PHQ QUESTIONS 1-9: 0
7. TROUBLE CONCENTRATING ON THINGS, SUCH AS READING THE NEWSPAPER OR WATCHING TELEVISION: NOT AT ALL
2. FEELING DOWN, DEPRESSED, IRRITABLE, OR HOPELESS: NOT AT ALL
1. LITTLE INTEREST OR PLEASURE IN DOING THINGS: NOT AT ALL
CLINICAL INTERPRETATION OF PHQ2 SCORE: 0
5. POOR APPETITE OR OVEREATING: NOT AT ALL
4. FEELING TIRED OR HAVING LITTLE ENERGY: NOT AT ALL
SUM OF ALL RESPONSES TO PHQ9 QUESTIONS 1 AND 2: 0
3. TROUBLE FALLING OR STAYING ASLEEP OR SLEEPING TOO MUCH: NOT AT ALL

## 2020-02-13 ASSESSMENT — ENCOUNTER SYMPTOMS: GENERAL WELL-BEING: GOOD

## 2020-02-13 ASSESSMENT — ACTIVITIES OF DAILY LIVING (ADL): BATHING_REQUIRES_ASSISTANCE: 0

## 2020-02-13 NOTE — PROGRESS NOTES
Chief Complaint   Patient presents with   • Results   HTN, wellness    HPI:  Mini Pham is a 70 y.o. here for Medicare Annual Wellness Visit     Hypertension  Meds: Irbesartan 300 mg QD, HCTZ, 25 mg daily, taking as prescribed.   Measuring BP at home: no  Denies: - headaches, vision problems, tinnitus.  - chest pain/pressure, palpitations, irregular heart beats, exertional, dyspnea, peripheral edema.  Low salt diet: advised  Diet: regular  Exercise: limited  BMI: 40  FH of HTN: unknown  Reviewed renal panel.     Dyslipidemia  The patient is on atorvastatin, 10 mg, taking daily, as prescribed. No myalgias, muscle cramps or pain.   Diet /Exercise/BMI: As above  FH: sister     Thrombocytosis, Atrophic gastritis,  Pernicious and microcytic anemia  The patient was diagnosed with atrophic gastritis on EGD  Labs showed microcytic anemia, controlled/normal B12 level, high PLT count improved.  Placed on vitamin B12 OTC, improved levels of B12 and CBC.  Reviewed labs.     Hypovitaminosis DD, elevated AP  The patient had low vitamin D level at.  Vitamin D supplement: no.  Labs also showed elevated alkaline phosphatase.     Anxiety, h/o panic attacks, depression  Onset:  Since her 20'  Course: stable  Mood/anxiety currently does not affect: daily activities/sleep.  Current treatment: celexa     Risk factors:   • Depression, anxiety  • H/o phobia: no  • H/o panic attacks: no  • H/o hypomanic or manic episode: no  • Substance abuse  (alcohol,  prescription drugs caffeine, tobacco): no  • Family support: yes  • Living alone:  no  • Family history of psych disorders: sisters x 2  • Stress: no  • PMH of abuse (sexual, physical, emotional abuse; neglect): no      LUCY-7 score:  2/20   1. Feeling nervous, anxious, or on edge            0   2. Not being able to stop or control worrying 0   3. Worrying too much about different things 0   4. Trouble relaxing 0   5. Being so restless that it is hard to sit still 0   6. Becoming easily  annoyed or irritable 0   7. Feeling afraid as if something awful might happen 0   Total score 0     Depression Screen (PHQ-2/PHQ-9) 10/8/2019 2020 2020   PHQ-2 Total Score 0 - -   PHQ-2 Total Score - - 0   PHQ-2 Total Score - 0 -   PHQ-9 Total Score - - 0     OBESITY, Body mass index is 40.05 kg/m².  No temperature intolerance. No change in hair/skin quality, BMs.   No HTN, buffalo hump, purple striae, flushing.  FH of obesity: sister    OA knees  Complains of pain in both knees due to severe osteoarthritis.  She used NSAIDs and Tylenol as needed.  She does not want any procedure at this point.    Patient Active Problem List    Diagnosis Date Noted   • Elevated alkaline phosphatase in  2020   • Hypovitaminosis D 2020   • BMI 40.0-44.9, adult ([facility]) 10/08/2019   • Major depression in remission ([facility]) 10/08/2019   • LUCY (generalized anxiety disorder) 10/08/2019   • Atrophic gastritis without hemorrhage 10/08/2019   • Thrombocytosis ([facility]) 10/08/2019   • Microcytic anemia 10/08/2019   • Essential hypertension 2019   • Dyslipidemia 2019   • IFG (impaired fasting glucose) 2019   • Primary osteoarthritis of both knees 2019   • Pernicious anemia 2019   • Health care maintenance 2019       Current Outpatient Medications   Medication Sig Dispense Refill   • losartan (COZAAR) 25 MG Tab Take 2 tabs QD po 90 Tab 0   • citalopram (CELEXA) 20 MG Tab TAKE 1 TABLET DAILY 90 Tab 0   • atorvastatin (LIPITOR) 10 MG Tab Take 1 Tab by mouth every day. 90 Tab 1   • vitamin D, Ergocalciferol, (DRISDOL) 1.25 MG (95886 UT) Cap capsule Take 1 Cap by mouth every 7 days. 12 Cap 0   • cyanocobalamin (VITAMIN B-12) 1000 MCG/ML Solution INJECT 1ML INTRAMUSCULARLY EVERY 30 DAYS. DISCARD 28  DAYS AFTER FIRST USE 3 mL 0   • Cyanocobalamin (B-12) 2500 MCG Tab Take 1 Tab by mouth every day. 90 Tab 1   • PNEUMOVAX 23 25 MCG/0.5ML Injection        No current facility-administered medications  for this visit.             Current supplements as per medication list.       Allergies: Patient has no known allergies.    Current social contact/activities: Family and friends    She  reports that she quit smoking about 28 years ago. Her smoking use included cigarettes. She has a 30.00 pack-year smoking history. She has never used smokeless tobacco. She reports current alcohol use of about 0.6 oz of alcohol per week. She reports current drug use. Drugs: Cocaine and Marijuana.  Counseling given: Not Answered      DPA/Advanced Directive: Not present    ROS:    Gait: Uses no assistive device  Ostomy: No  Other tubes: No  Amputations: No  Chronic oxygen use: No  Wears hearing aids: No   : Denies any urinary leakage during the last 6 months    Screening:    Depression Screening  Little interest or pleasure in doing things?  0 - not at all  Feeling down, depressed , or hopeless? 0 - not at all  Patient Health Questionnaire Score: 0     Screening for Cognitive Impairment  Three Minute Recall (village, kitchen, baby) 3/3    Boris clock face with all 12 numbers and set the hands to show 10 past 10.  Yes    Cognitive concerns identified deferred for follow up unless specifically addressed in assessment and plan.    Fall Risk Assessment  Has the patient had two or more falls in the last year or any fall with injury in the last year?  No    Safety Assessment  Throw rugs on floor.  Yes  Handrails on all stairs.  Yes  Good lighting in all hallways.  Yes  Difficulty hearing.  No  Patient counseled about all safety risks that were identified.    Functional Assessment ADLs  Are there any barriers preventing you from cooking for yourself or meeting nutritional needs?  No.    Are there any barriers preventing you from driving safely or obtaining transportation?  No.    Are there any barriers preventing you from using a telephone or calling for help?  No.    Are there any barriers preventing you from shopping?  No.    Are there any  barriers preventing you from taking care of your own finances?  No.    Are there any barriers preventing you from managing your medications?  No.    Are there any barriers preventing you from showering, bathing or dressing yourself?  No.    Are you currently engaging in any exercise or physical activity?  Yes.  Gym x 2 d/w, pool  What is your perception of your health?  Good.    Health Maintenance Summary                IMM ZOSTER VACCINES Overdue 2002     MAMMOGRAM Next Due 3/28/2020      Done 3/28/2019 MA-MAMMO DIAGNOSTIC UNILAT W/TOMOSYNTHESIS W/O CAD     Patient has more history with this topic...    IMM DTaP/Tdap/Td Vaccine Next Due 2022      Done 2012 Imm Admin: Tdap Vaccine    BONE DENSITY Next Due 2024      Done 2019 DS-BONE DENSITY STUDY (DEXA)    COLONOSCOPY Next Due 2028      Done 2018 REFERRAL TO GI FOR COLONOSCOPY          Patient Care Team:  Adama Rogers M.D. as PCP - General (Family Medicine)    Social History     Tobacco Use   • Smoking status: Former Smoker     Packs/day: 1.00     Years: 30.00     Pack years: 30.00     Types: Cigarettes     Last attempt to quit:      Years since quittin.1   • Smokeless tobacco: Never Used   Substance Use Topics   • Alcohol use: Yes     Alcohol/week: 0.6 oz     Types: 1 Glasses of wine per week     Comment: 3 times/week   • Drug use: Yes     Types: Cocaine, Marijuana     Comment: Remote, 50 years ago     Family History   Problem Relation Age of Onset   • Alzheimer's Disease Mother 50        early onset   • Cancer Father 50        laryngeal, mets to bladder   • Heart Attack Sister 56   • Diabetes Sister    • Hyperlipidemia Sister    • Depression Sister    • Cancer Maternal Aunt 50        colon   • Colon Cancer Sister 69   • Depression Sister    • Hypertension Neg Hx      She  has a past medical history of Anemia, pernicious, Anxiety, Atrophic gastritis, Hyperlipidemia, Hypertension, Osteoarthritis, Prediabetes, and  "Tendonitis.   Past Surgical History:   Procedure Laterality Date   • CHOLECYSTECTOMY  2014     Exam:   Blood Pressure 126/78 (BP Location: Left arm, Patient Position: Sitting, BP Cuff Size: Adult)   Pulse 72   Temperature 36.8 °C (98.2 °F) (Temporal)   Respiration 14   Height 1.657 m (5' 5.25\")   Weight 110 kg (242 lb 8.1 oz)   Oxygen Saturation 96%  Body mass index is 40.05 kg/m².  Hearing good.    Dentition good  Alert, oriented in no acute distress.  Eye contact is good, speech goal directed, affect calm    Assessment and Plan    1. Medicare annual wellness visit, initial  Reviewed PMH, PSH, FH, SH, ALL, MEDS, HCM/IMM.   - Initial Annual Wellness Visit - Includes PPPS ()    2. Health care maintenance  Per HPI  - Initial Annual Wellness Visit - Includes PPPS ()    3. Need for vaccination  Due Shingrix  - Initial Annual Wellness Visit - Includes PPPS ()    4. Essential hypertension  Controlled, continue current treatment  - Initial Annual Wellness Visit - Includes PPPS ()  - Comp Metabolic Panel; Future  - losartan (COZAAR) 25 MG Tab; Take 2 tabs QD po  Dispense: 90 Tab; Refill: 0    5. Dyslipidemia  Controlled, continue current treatment  - Initial Annual Wellness Visit - Includes PPPS ()  - Comp Metabolic Panel; Future  - Lipid Profile; Future  - atorvastatin (LIPITOR) 10 MG Tab; Take 1 Tab by mouth every day.  Dispense: 90 Tab; Refill: 1    6. IFG (impaired fasting glucose)  Discussed about risk to develop DM.   Advised low carb diet, exercise, watch for WT.   - Initial Annual Wellness Visit - Includes PPPS ()  - Comp Metabolic Panel; Future    7. Thrombocytosis (HCC)  - Initial Annual Wellness Visit - Includes PPPS ()  8. Atrophic gastritis without hemorrhage  - Initial Annual Wellness Visit - Includes PPPS ()  9. Pernicious anemia  - Initial Annual Wellness Visit - Includes PPPS ()  - CBC WITH DIFFERENTIAL; Future  - cyanocobalamin (VITAMIN B-12) injection " 1,000 mcg  - VITAMIN B12; Future  10. Microcytic anemia  The patient has been followed by hematology  -Advised to continue current treatment  - Initial Annual Wellness Visit - Includes PPPS ()  - CBC WITH DIFFERENTIAL; Future  - IRON/TOTAL IRON BIND; Future  - FERRITIN; Future    11. Hypovitaminosis D  Start high-dose vitamin D  - Initial Annual Wellness Visit - Includes PPPS ()  - vitamin D, Ergocalciferol, (DRISDOL) 1.25 MG (61610 UT) Cap capsule; Take 1 Cap by mouth every 7 days.  Dispense: 12 Cap; Refill: 0    12. Elevated alkaline phosphatase   Follow-up labs  - Initial Annual Wellness Visit - Includes PPPS ()  - ALKALINE PHOSPHATASE ISOENZYMES; Future    13. LUCY (generalized anxiety disorder)  Controlled, continue current treatment  - Initial Annual Wellness Visit - Includes PPPS ()  - citalopram (CELEXA) 20 MG Tab; TAKE 1 TABLET DAILY  Dispense: 90 Tab; Refill: 0  14. History of panic attacks  - Initial Annual Wellness Visit - Includes PPPS ()  - citalopram (CELEXA) 20 MG Tab; TAKE 1 TABLET DAILY  Dispense: 90 Tab; Refill: 0  15. Major depression in remission (HCC)  - Initial Annual Wellness Visit - Includes PPPS ()  - citalopram (CELEXA) 20 MG Tab; TAKE 1 TABLET DAILY  Dispense: 90 Tab; Refill: 0    16. BMI 40.0-44.9, adult (HCC)  Appropriate counseling given  - Initial Annual Wellness Visit - Includes PPPS ()    17. Primary osteoarthritis of both knees  Advised to continue orthopedics follow-up, activity as tolerated; brace may help  - Initial Annual Wellness Visit - Includes PPPS ()    18. Encounter for screening mammogram for malignant neoplasm of breast  - Initial Annual Wellness Visit - Includes PPPS ()  - MA-SCREENING MAMMO BILAT W/TOMOSYNTHESIS W/CAD; Future    Services suggested: No services needed at this time  Health Care Screening: Age-appropriate preventive services recommended by USPTF and ACIP covered by Medicare were discussed today. Services  ordered if indicated and agreed upon by the patient.  Referrals offered: Community-based lifestyle interventions to reduce health risks and promote self-management and wellness, fall prevention, nutrition, physical activity, tobacco-use cessation, weight loss, and mental health services as per orders if indicated.    Discussion today about general wellness and lifestyle habits:    · Prevent falls and reduce trip hazards; Cautioned about securing or removing rugs.  · Have a working fire alarm and carbon monoxide detector;   · Engage in regular physical activity and social activities     Follow-up: in 3 months and prn    I attest that I saw this patient on this date and due to technical issues am not showing as the author of the note.

## 2020-02-25 ENCOUNTER — PATIENT MESSAGE (OUTPATIENT)
Dept: MEDICAL GROUP | Facility: MEDICAL CENTER | Age: 70
End: 2020-02-25

## 2020-02-25 DIAGNOSIS — I10 ESSENTIAL HYPERTENSION: ICD-10-CM

## 2020-02-25 DIAGNOSIS — Z86.59 HISTORY OF PANIC ATTACKS: ICD-10-CM

## 2020-02-25 DIAGNOSIS — F32.5 MAJOR DEPRESSION IN REMISSION (HCC): ICD-10-CM

## 2020-02-25 DIAGNOSIS — F41.1 GAD (GENERALIZED ANXIETY DISORDER): ICD-10-CM

## 2020-02-25 DIAGNOSIS — E78.5 DYSLIPIDEMIA: ICD-10-CM

## 2020-02-25 DIAGNOSIS — E55.9 HYPOVITAMINOSIS D: ICD-10-CM

## 2020-02-25 RX ORDER — ERGOCALCIFEROL 1.25 MG/1
50000 CAPSULE ORAL
Qty: 12 CAP | Refills: 0 | Status: SHIPPED | OUTPATIENT
Start: 2020-02-25 | End: 2020-05-13

## 2020-02-25 RX ORDER — ATORVASTATIN CALCIUM 10 MG/1
10 TABLET, FILM COATED ORAL DAILY
Qty: 90 TAB | Refills: 1 | Status: SHIPPED | OUTPATIENT
Start: 2020-02-25 | End: 2020-05-13 | Stop reason: SDUPTHER

## 2020-02-25 RX ORDER — CITALOPRAM 20 MG/1
TABLET ORAL
Qty: 90 TAB | Refills: 0 | Status: SHIPPED | OUTPATIENT
Start: 2020-02-25 | End: 2020-03-19

## 2020-02-25 RX ORDER — LOSARTAN POTASSIUM 25 MG/1
TABLET ORAL
Qty: 90 TAB | Refills: 0 | Status: SHIPPED | OUTPATIENT
Start: 2020-02-25 | End: 2020-04-10

## 2020-03-12 ENCOUNTER — OFFICE VISIT (OUTPATIENT)
Dept: MEDICAL GROUP | Age: 70
End: 2020-03-12
Payer: MEDICARE

## 2020-03-12 VITALS
DIASTOLIC BLOOD PRESSURE: 96 MMHG | OXYGEN SATURATION: 95 % | TEMPERATURE: 97.9 F | SYSTOLIC BLOOD PRESSURE: 164 MMHG | WEIGHT: 241.6 LBS | HEIGHT: 65 IN | BODY MASS INDEX: 40.25 KG/M2 | HEART RATE: 76 BPM

## 2020-03-12 DIAGNOSIS — F32.5 MAJOR DEPRESSION IN REMISSION (HCC): ICD-10-CM

## 2020-03-12 DIAGNOSIS — E55.9 HYPOVITAMINOSIS D: ICD-10-CM

## 2020-03-12 DIAGNOSIS — D50.9 MICROCYTIC ANEMIA: ICD-10-CM

## 2020-03-12 DIAGNOSIS — R73.01 IFG (IMPAIRED FASTING GLUCOSE): ICD-10-CM

## 2020-03-12 DIAGNOSIS — K29.40 ATROPHIC GASTRITIS WITHOUT HEMORRHAGE: ICD-10-CM

## 2020-03-12 DIAGNOSIS — I10 ESSENTIAL HYPERTENSION: ICD-10-CM

## 2020-03-12 DIAGNOSIS — M17.0 PRIMARY OSTEOARTHRITIS OF BOTH KNEES: ICD-10-CM

## 2020-03-12 DIAGNOSIS — F41.1 GAD (GENERALIZED ANXIETY DISORDER): ICD-10-CM

## 2020-03-12 DIAGNOSIS — D51.0 PERNICIOUS ANEMIA: ICD-10-CM

## 2020-03-12 DIAGNOSIS — Z00.00 HEALTH CARE MAINTENANCE: ICD-10-CM

## 2020-03-12 DIAGNOSIS — Z86.59 HISTORY OF PANIC ATTACKS: ICD-10-CM

## 2020-03-12 DIAGNOSIS — E78.5 DYSLIPIDEMIA: ICD-10-CM

## 2020-03-12 DIAGNOSIS — D75.839 THROMBOCYTOSIS: ICD-10-CM

## 2020-03-12 DIAGNOSIS — R74.8 ELEVATED ALKALINE PHOSPHATASE LEVEL: ICD-10-CM

## 2020-03-12 PROCEDURE — 99214 OFFICE O/P EST MOD 30 MIN: CPT | Performed by: INTERNAL MEDICINE

## 2020-03-12 SDOH — HEALTH STABILITY: MENTAL HEALTH: HOW MANY STANDARD DRINKS CONTAINING ALCOHOL DO YOU HAVE ON A TYPICAL DAY?: 1 OR 2

## 2020-03-12 SDOH — HEALTH STABILITY: MENTAL HEALTH: HOW OFTEN DO YOU HAVE 6 OR MORE DRINKS ON ONE OCCASION?: NEVER

## 2020-03-12 SDOH — HEALTH STABILITY: MENTAL HEALTH: HOW OFTEN DO YOU HAVE A DRINK CONTAINING ALCOHOL?: 2-3 TIMES A WEEK

## 2020-03-12 ASSESSMENT — FIBROSIS 4 INDEX: FIB4 SCORE: 0.58

## 2020-03-12 ASSESSMENT — ACTIVITIES OF DAILY LIVING (ADL): BATHING_REQUIRES_ASSISTANCE: 0

## 2020-03-12 ASSESSMENT — PATIENT HEALTH QUESTIONNAIRE - PHQ9: CLINICAL INTERPRETATION OF PHQ2 SCORE: 0

## 2020-03-12 ASSESSMENT — PAIN SCALES - GENERAL: PAINLEVEL: NO PAIN

## 2020-03-12 ASSESSMENT — ENCOUNTER SYMPTOMS: GENERAL WELL-BEING: GOOD

## 2020-03-12 NOTE — PROGRESS NOTES
Chief Complaint   Patient presents with   • Review med conditions, htn     HPI:    Hypertension  Meds: Irbesartan 300 mg QD, HCTZ, 25 mg daily, taking as prescribed.   Measuring BP at home: no  Denies: - headaches, vision problems, tinnitus.  - chest pain/pressure, palpitations, irregular heart beats, exertional, dyspnea, peripheral edema.  Low salt diet: advised  Diet: regular  Exercise: limited  BMI: 40  FH of HTN: unknown  Reviewed renal panel.     Dyslipidemia  The patient is on atorvastatin, 10 mg, taking daily, as prescribed. No myalgias, muscle cramps or pain.   Diet /Exercise/BMI: As above  FH: sister    IFG  The patient had elevated FBG.  No polydipsia, polyphagia, polyuria.  No abdominal pain, weight loss, fatigue.  Diet/exercise/BMI: As above  FH of DM: MGF, sister    Thrombocytosis, Atrophic gastritis, Pernicious and microcytic anemia  The patient was diagnosed with atrophic gastritis on EGD  Labs showed microcytic anemia, controlled/normal B12 level, high PLT count improved.  Placed on vitamin B12 OTC, improved levels of B12 and CBC.  Reviewed labs.      Hypovitaminosis D, elevated AP  The patient had low vitamin D level at.  Vitamin D supplement: no.  Labs also showed elevated alkaline phosphatase.     Anxiety, h/o panic attacks, depression  Onset:  Since her 20'  Course: stable  Mood/anxiety currently does not affect: daily activities/sleep.  Current treatment: celexa     Risk factors:   • Depression, anxiety  • H/o phobia: no  • H/o panic attacks: no  • H/o hypomanic or manic episode: no  • Substance abuse  (alcohol,  prescription drugs caffeine, tobacco): no  • Family support: yes  • Living alone:  no  • Family history of psych disorders: sisters x 2  • Stress: no  • PMH of abuse (sexual, physical, emotional abuse; neglect): no      LUCY-7 score:  2/20   1. Feeling nervous, anxious, or on edge            0   2. Not being able to stop or control worrying 0   3. Worrying too much about different things  0   4. Trouble relaxing 0   5. Being so restless that it is hard to sit still 0   6. Becoming easily annoyed or irritable 0   7. Feeling afraid as if something awful might happen 0   Total score 0      Depression Screen (PHQ-2/PHQ-9) 10/8/2019 2/13/2020 2/13/2020   PHQ-2 Total Score 0 - -   PHQ-2 Total Score - - 0   PHQ-2 Total Score - 0 -   PHQ-9 Total Score - - 0      Body mass index is 40.20 kg/m².  No temperature intolerance. No change in hair/skin quality, BMs.   No HTN, buffalo hump, purple striae, flushing.  FH of obesity: sister     OA knees  Complains of pain in both knees due to severe osteoarthritis.  She used NSAIDs and Tylenol as needed.  She does not want any procedure at this point.    Mini is a 70 y.o. here for Medicare Annual Wellness Visit  Patient Active Problem List    Diagnosis Date Noted   • Elevated alkaline phosphatase level 02/13/2020   • Hypovitaminosis D 02/13/2020   • BMI 40.0-44.9, adult ([facility]) 10/08/2019   • Major depression in remission ([facility]) 10/08/2019   • LUCY (generalized anxiety disorder) 10/08/2019   • Atrophic gastritis without hemorrhage 10/08/2019   • Thrombocytosis ([facility]) 10/08/2019   • Microcytic anemia 10/08/2019   • Essential hypertension 02/25/2019   • Dyslipidemia 02/25/2019   • IFG (impaired fasting glucose) 02/25/2019   • Primary osteoarthritis of both knees 02/25/2019   • Pernicious anemia 02/25/2019   • Health care maintenance 02/25/2019     Current Outpatient Medications   Medication Sig Dispense Refill   • citalopram (CELEXA) 20 MG Tab TAKE 1 TABLET DAILY 90 Tab 0   • losartan (COZAAR) 25 MG Tab Take 2 tabs QD po 90 Tab 0   • vitamin D, Ergocalciferol, (DRISDOL) 1.25 MG (91162 UT) Cap capsule Take 1 Cap by mouth every 7 days. 12 Cap 0   • atorvastatin (LIPITOR) 10 MG Tab Take 1 Tab by mouth every day. 90 Tab 1   • Cyanocobalamin (B-12) 2500 MCG Tab Take 1 Tab by mouth every day. 90 Tab 1   • PNEUMOVAX 23 25 MCG/0.5ML Injection      • cyanocobalamin (VITAMIN B-12)  1000 MCG/ML Solution INJECT 1ML INTRAMUSCULARLY EVERY 30 DAYS. DISCARD 28  DAYS AFTER FIRST USE (Patient not taking: Reported on 3/12/2020) 3 mL 0     No current facility-administered medications for this visit.       Patient is taking medications as noted in medication list.  Current supplements as per medication list.     Allergies: Patient has no known allergies.    Current social contact/activities: lives in a 55 and over community and does activities with them, visits with friends and sister     Is patient current with immunizations? No, due for SHINGRIX (Shingles). Patient is interested in receiving NONE today.    She  reports that she quit smoking about 28 years ago. Her smoking use included cigarettes. She has a 30.00 pack-year smoking history. She has never used smokeless tobacco. She reports current alcohol use of about 0.6 oz of alcohol per week. She reports current drug use. Drugs: Cocaine and Marijuana.  Counseling given: Not Answered    DPA/Advanced directive: Patient does not have an Advanced Directive.  A packet and workshop information was given on Advanced Directives.    ROS:    Gait: Uses no assistive device   Ostomy: No   Other tubes: No   Amputations: No   Chronic oxygen use No   Last eye exam Year 2018  Wears hearing aids: No   : Denies any urinary leakage during the last 6 months    Screening:  Depression Screening  Little interest or pleasure in doing things?  0 - not at all  Feeling down, depressed, or hopeless? 0 - not at all  Patient Health Questionnaire Score: 0    Screening for Cognitive Impairment  Three Minute Recall (village, kitchen, baby)  3/3 Village, baby, kitchen  Boris clock face with all 12 numbers and set the hands to show 10 past 10.  Yes /5  If cognitive concerns identified, deferred for follow up unless specifically addressed in assessment and plan.    Fall Risk Assessment  Has the patient had two or more falls in the last year or any fall with injury in the last year?   No  If fall risk identified, deferred for follow up unless specifically addressed in assessment and plan.    Safety Assessment  Throw rugs on floor.  Yes  Handrails on all stairs.  Yes  Good lighting in all hallways.  Yes  Difficulty hearing.  No  Patient counseled about all safety risks that were identified.    Functional Assessment ADLs  Are there any barriers preventing you from cooking for yourself or meeting nutritional needs?  No.    Are there any barriers preventing you from driving safely or obtaining transportation?  No.    Are there any barriers preventing you from using a telephone or calling for help?  No.    Are there any barriers preventing you from shopping?  No.    Are there any barriers preventing you from taking care of your own finances?  No.    Are there any barriers preventing you from managing your medications?  No.    Are there any barriers preventing you from showering, bathing or dressing yourself?  No.    Are you currently engaging in any exercise or physical activity?  Yes.  Stretch class 2-3 times a week, swimming  What is your perception of your health?  Good.    Health Maintenance Summary                IMM ZOSTER VACCINES Overdue 1/31/2002     MAMMOGRAM Next Due 3/28/2020      Done 3/28/2019 MA-MAMMO DIAGNOSTIC UNILAT W/TOMOSYNTHESIS W/O CAD     Patient has more history with this topic...    Annual Wellness Visit Next Due 2/13/2021      Done 2/13/2020      Patient has more history with this topic...    IMM DTaP/Tdap/Td Vaccine Next Due 1/1/2022      Done 1/1/2012 Imm Admin: Tdap Vaccine    BONE DENSITY Next Due 9/12/2024      Done 9/12/2019 DS-BONE DENSITY STUDY (DEXA)    COLONOSCOPY Next Due 9/13/2028      Done 9/13/2018 REFERRAL TO GI FOR COLONOSCOPY        Patient Care Team:  Adama Rogers M.D. as PCP - General (Family Medicine)    Social History     Tobacco Use   • Smoking status: Former Smoker     Packs/day: 1.00     Years: 30.00     Pack years: 30.00     Types: Cigarettes  "    Last attempt to quit:      Years since quittin.2   • Smokeless tobacco: Never Used   Substance Use Topics   • Alcohol use: Yes     Alcohol/week: 0.6 oz     Types: 1 Glasses of wine per week     Frequency: 2-3 times a week     Drinks per session: 1 or 2     Binge frequency: Never     Comment: 3 times/week   • Drug use: Yes     Types: Cocaine, Marijuana     Comment: Remote, 50 years ago     Family History   Problem Relation Age of Onset   • Alzheimer's Disease Mother 50        early onset   • Cancer Father 50        laryngeal, mets to bladder   • Heart Attack Sister 56   • Diabetes Sister    • Hyperlipidemia Sister    • Depression Sister    • Cancer Maternal Aunt 50        colon   • Colon Cancer Sister 69   • Depression Sister    • No Known Problems Maternal Grandmother    • Diabetes Maternal Grandfather         late onset   • No Known Problems Paternal Grandmother    • No Known Problems Paternal Grandfather    • Hypertension Neg Hx      She  has a past medical history of Anemia, pernicious, Anxiety, Atrophic gastritis, Hyperlipidemia, Hypertension, Osteoarthritis, Prediabetes, and Tendonitis.   Past Surgical History:   Procedure Laterality Date   • CHOLECYSTECTOMY       Exam:   BP (!) 164/96 (BP Location: Left arm, Patient Position: Sitting, BP Cuff Size: Adult long)   Pulse 76   Temp 36.6 °C (97.9 °F) (Temporal)   Ht 1.651 m (5' 5\")   Wt 109.6 kg (241 lb 9.6 oz)   SpO2 95%  Body mass index is 40.2 kg/m².  Hearing good.    Dentition good  Alert, oriented in no acute distress.  Eye contact is good, speech goal directed, affect calm    Labs  Reviewed and discussed  Lab Results   Component Value Date/Time    CHOLSTRLTOT 177 10/02/2019 08:42 AM    LDL 95 10/02/2019 08:42 AM    HDL 45 10/02/2019 08:42 AM    TRIGLYCERIDE 184 (H) 10/02/2019 08:42 AM       Lab Results   Component Value Date/Time    SODIUM 139 2020 09:32 AM    POTASSIUM 3.8 2020 09:32 AM    CHLORIDE 104 2020 09:32 AM    " CO2 26 02/07/2020 09:32 AM    GLUCOSE 95 02/07/2020 09:32 AM    BUN 5 (L) 02/07/2020 09:32 AM    CREATININE 0.59 02/07/2020 09:32 AM     Lab Results   Component Value Date/Time    ALKPHOSPHAT 119 (H) 02/07/2020 09:32 AM    ASTSGOT 12 02/07/2020 09:32 AM    ALTSGPT 15 02/07/2020 09:32 AM    TBILIRUBIN 0.5 02/07/2020 09:32 AM      Lab Results   Component Value Date/Time    HBA1C 5.4 10/02/2019 08:42 AM    HBA1C 5.7 (H) 03/12/2019 10:41 AM     Assessment and Plan    1. Essential hypertension  Controlled, continue with current treatment.    2. Dyslipidemia  Controlled, continue with current treatment.    3. IFG (impaired fasting glucose)  Controlled, continue with current treatment.    4. Thrombocytosis (HCC)  Controlled, continue with current treatment.    5. Atrophic gastritis without hemorrhage  6. Pernicious anemia  7. Microcytic anemia  Controlled, continue with current treatment.    8. Hypovitaminosis D  On vitamin D supplement, follow-up labs    9. Elevated alkaline phosphatase level  Mild, follow-up labs    10. LUCY (generalized anxiety disorder)  11. History of panic attacks  12. Major depression in remission (HCC)  Controlled, continue with current treatment.    13.  Primary osteoarthritis of both knees  Controlled, continue with current treatment.    14.Health care maintenance  Due chingrix    Services suggested: No services needed at this time  Health Care Screening recommendations as per orders if indicated.  Referrals offered: PT/OT/Nutrition counseling/Behavioral Health/Smoking cessation as per orders if indicated.    Discussion today about general wellness and lifestyle habits:    · Prevent falls and reduce trip hazards; Cautioned about securing or removing rugs.  · Have a working fire alarm and carbon monoxide detector;   · Engage in regular physical activity and social activities       Follow-up: in 3 months with labs    I attest that I saw this patient on this date and due to technical issues am not  showing as the author of the note.

## 2020-03-12 NOTE — PROGRESS NOTES
HPI:   Hypertension   Meds: Irbesartan 300 mg QD, HCTZ, 25 mg daily, taking as prescribed.   Measuring BP at home: no   Denies: - headaches, vision problems, tinnitus.   - chest pain/pressure, palpitations, irregular heart beats, exertional, dyspnea, peripheral edema.   Low salt diet: advised   Diet: regular   Exercise: limited   BMI: 40   FH of HTN: unknown   Reviewed renal panel.   Dyslipidemia   The patient is on atorvastatin, 10 mg, taking daily, as prescribed. No myalgias, muscle cramps or pain.   Diet /Exercise/BMI: As above   FH: sister   IFG   The patient had elevated FBG.   No polydipsia, polyphagia, polyuria.   No abdominal pain, weight loss, fatigue.   Diet/exercise/BMI: As above   FH of DM: MGF, sister   Thrombocytosis, Atrophic gastritis, Pernicious and microcytic anemia   The patient was diagnosed with atrophic gastritis on EGD   Labs showed microcytic anemia, controlled/normal B12 level, high PLT count improved.   Placed on vitamin B12 OTC, improved levels of B12 and CBC.   Reviewed labs.   Hypovitaminosis D, elevated AP   The patient had low vitamin D level at.   Vitamin D supplement: no.   Labs also showed elevated alkaline phosphatase.   Anxiety, h/o panic attacks, depression   Onset: Since her 20'   Course: stable   Mood/anxiety currently does not affect: daily activities/sleep.   Current treatment: celexa   Risk factors:   Depression, anxiety   H/o phobia: no   H/o panic attacks: no   H/o hypomanic or manic episode: no   Substance abuse (alcohol, prescription drugs caffeine, tobacco): no  Family support: yes   Living alone: no   Family history of psych disorders: sisters x 2   Stress: no   PMH of abuse (sexual, physical, emotional abuse; neglect): no   LUCY-7 score:  2/20   1. Feeling nervous, anxious, or on edge  0   2. Not being able to stop or control worrying 0   3. Worrying too much about different things 0   4. Trouble relaxing 0   5. Being so restless that it is hard to sit still 0   6.  Becoming easily annoyed or irritable 0   7. Feeling afraid as if something awful might happen 0   Total score 0     Depression Screen (PHQ-2/PHQ-9) 10/8/2019 2/13/2020 2/13/2020   PHQ-2 Total Score 0 - -   PHQ-2 Total Score - - 0   PHQ-2 Total Score - 0 -   PHQ-9 Total Score - - 0   Body mass index is 40.20 kg/m².   No temperature intolerance. No change in hair/skin quality, BMs.   No HTN, buffalo hump, purple striae, flushing.   FH of obesity: sister   OA knees   Complains of pain in both knees due to severe osteoarthritis.   She used NSAIDs and Tylenol as needed.   She does not want any procedure at this point.   Mini is a 70 y.o. here for Medicare Annual Wellness Visit        Patient Active Problem List    Diagnosis Date Noted   • Elevated alkaline phosphatase level 02/13/2020   • Hypovitaminosis D 02/13/2020   • BMI 40.0-44.9, adult ([facility]) 10/08/2019   • Major depression in remission ([facility]) 10/08/2019   • LUCY (generalized anxiety disorder) 10/08/2019   • Atrophic gastritis without hemorrhage 10/08/2019   • Thrombocytosis ([facility]) 10/08/2019   • Microcytic anemia 10/08/2019   • Essential hypertension 02/25/2019   • Dyslipidemia 02/25/2019   • IFG (impaired fasting glucose) 02/25/2019   • Primary osteoarthritis of both knees 02/25/2019   • Pernicious anemia 02/25/2019   • Health care maintenance 02/25/2019            Current Outpatient Medications   Medication Sig Dispense Refill   • citalopram (CELEXA) 20 MG Tab TAKE 1 TABLET DAILY 90 Tab 0   • losartan (COZAAR) 25 MG Tab Take 2 tabs QD po 90 Tab 0   • vitamin D, Ergocalciferol, (DRISDOL) 1.25 MG (87379 UT) Cap capsule Take 1 Cap by mouth every 7 days. 12 Cap 0   • atorvastatin (LIPITOR) 10 MG Tab Take 1 Tab by mouth every day. 90 Tab 1   • Cyanocobalamin (B-12) 2500 MCG Tab Take 1 Tab by mouth every day. 90 Tab 1   • PNEUMOVAX 23 25 MCG/0.5ML Injection      • cyanocobalamin (VITAMIN B-12) 1000 MCG/ML Solution INJECT 1ML INTRAMUSCULARLY EVERY 30 DAYS. DISCARD 28  DAYS AFTER FIRST USE (Patient not taking: Reported on 3/12/2020) 3 mL 0     No current facility-administered medications for this visit.    Patient is taking medications as noted in medication list.   Current supplements as per medication list.   Allergies: Patient has no known allergies.   Current social contact/activities: lives in a 55 and over community and does activities with them, visits with friends and sister   Is patient current with immunizations? No, due for SHINGRIX (Shingles). Patient is interested in receiving NONE today.   She reports that she quit smoking about 28 years ago. Her smoking use included cigarettes. She has a 30.00 pack-year smoking history. She has never used smokeless tobacco. She reports current alcohol use of about 0.6 oz of alcohol per week. She reports current drug use. Drugs: Cocaine and Marijuana.   Counseling given: Not Answered   DPA/Advanced directive: Patient does not have an Advanced Directive. A packet and workshop information was given on Advanced Directives.   ROS:   Gait: Uses no assistive device   Ostomy: No   Other tubes: No   Amputations: No   Chronic oxygen use No   Last eye exam Year 2018   Wears hearing aids: No   : Denies any urinary leakage during the last 6 months   Screening:   Depression Screening   Little interest or pleasure in doing things? 0 - not at all   Feeling down, depressed, or hopeless? 0 - not at all   Patient Health Questionnaire Score: 0   Screening for Cognitive Impairment   Three Minute Recall (village, kitchen, baby) 3/3 Village, baby, kitchen   Boris clock face with all 12 numbers and set the hands to show 10 past 10. Yes /5   If cognitive concerns identified, deferred for follow up unless specifically addressed in assessment and plan.   Fall Risk Assessment   Has the patient had two or more falls in the last year or any fall with injury in the last year? No   If fall risk identified, deferred for follow up unless specifically addressed in  assessment and plan.   Safety Assessment   Throw rugs on floor. Yes   Handrails on all stairs. Yes   Good lighting in all hallways. Yes   Difficulty hearing. No   Patient counseled about all safety risks that were identified.   Functional Assessment ADLs   Are there any barriers preventing you from cooking for yourself or meeting nutritional needs? No.   Are there any barriers preventing you from driving safely or obtaining transportation? No.   Are there any barriers preventing you from using a telephone or calling for help? No.   Are there any barriers preventing you from shopping? No.   Are there any barriers preventing you from taking care of your own finances? No.   Are there any barriers preventing you from managing your medications? No.   Are there any barriers preventing you from showering, bathing or dressing yourself?   No.   Are you currently engaging in any exercise or physical activity? Yes. Stretch class 2-3 times a week, swimming   What is your perception of your health? Good.           Health Maintenance Summary              IMM ZOSTER VACCINES Overdue 2002     MAMMOGRAM Next Due 3/28/2020      Done 3/28/2019 MA-MAMMO DIAGNOSTIC UNILAT W/TOMOSYNTHESIS W/O CAD     Patient has more history with this topic...    Annual Wellness Visit Next Due 2021      Done 2020      Patient has more history with this topic...    IMM DTaP/Tdap/Td Vaccine Next Due 2022      Done 2012 Imm Admin: Tdap Vaccine    BONE DENSITY Next Due 2024      Done 2019 DS-BONE DENSITY STUDY (DEXA)    COLONOSCOPY Next Due 2028      Done 2018 REFERRAL TO GI FOR COLONOSCOPY        Patient Care Team:   Adama Rogers M.D. as PCP - General (Family Medicine)   Social History           Tobacco Use   • Smoking status: Former Smoker     Packs/day: 1.00     Years: 30.00     Pack years: 30.00     Types: Cigarettes     Last attempt to quit: 1992     Years since quittin.2   • Smokeless tobacco:  "Never Used   Substance Use Topics   • Alcohol use: Yes     Alcohol/week: 0.6 oz     Types: 1 Glasses of wine per week     Frequency: 2-3 times a week     Drinks per session: 1 or 2     Binge frequency: Never     Comment: 3 times/week   • Drug use: Yes     Types: Cocaine, Marijuana     Comment: Remote, 50 years ago           Family History   Problem Relation Age of Onset   • Alzheimer's Disease Mother 50    early onset   • Cancer Father 50    laryngeal, mets to bladder   • Heart Attack Sister 56   • Diabetes Sister    • Hyperlipidemia Sister    • Depression Sister    • Cancer Maternal Aunt 50    colon   • Colon Cancer Sister 69   • Depression Sister    • No Known Problems Maternal Grandmother    • Diabetes Maternal Grandfather     late onset   • No Known Problems Paternal Grandmother    • No Known Problems Paternal Grandfather    • Hypertension Neg Hx    She has a past medical history of Anemia, pernicious, Anxiety, Atrophic gastritis, Hyperlipidemia, Hypertension, Osteoarthritis, Prediabetes, and Tendonitis.         Past Surgical History:   Procedure Laterality Date   • CHOLECYSTECTOMY  2014   Exam:   BP (!) 164/96 (BP Location: Left arm, Patient Position: Sitting, BP Cuff Size: Adult long)  Pulse 76  Temp 36.6 °C (97.9 °F) (Temporal)  Ht 1.651 m (5' 5\")  Wt 109.6 kg (241 lb 9.6 oz)  SpO2 95% Body mass index is 40.2 kg/m².   Hearing good.   Dentition good   Alert, oriented in no acute distress.   Eye contact is good, speech goal directed, affect calm   Labs   Reviewed and discussed         Lab Results   Component Value Date/Time    CHOLSTRLTOT 177 10/02/2019 08:42 AM    LDL 95 10/02/2019 08:42 AM    HDL 45 10/02/2019 08:42 AM    TRIGLYCERIDE 184 (H) 10/02/2019 08:42 AM           Lab Results   Component Value Date/Time    SODIUM 139 02/07/2020 09:32 AM    POTASSIUM 3.8 02/07/2020 09:32 AM    CHLORIDE 104 02/07/2020 09:32 AM    CO2 26 02/07/2020 09:32 AM    GLUCOSE 95 02/07/2020 09:32 AM    BUN 5 (L) 02/07/2020 " 09:32 AM    CREATININE 0.59 02/07/2020 09:32 AM           Lab Results   Component Value Date/Time    ALKPHOSPHAT 119 (H) 02/07/2020 09:32 AM    ASTSGOT 12 02/07/2020 09:32 AM    ALTSGPT 15 02/07/2020 09:32 AM    TBILIRUBIN 0.5 02/07/2020 09:32 AM           Lab Results   Component Value Date/Time    HBA1C 5.4 10/02/2019 08:42 AM    HBA1C 5.7 (H) 03/12/2019 10:41 AM   Assessment and Plan   1. Essential hypertension   Controlled, continue with current treatment.   2. Dyslipidemia   Controlled, continue with current treatment.   3. IFG (impaired fasting glucose)   Controlled, continue with current treatment.   4. Thrombocytosis (HCC)   Controlled, continue with current treatment.   5. Atrophic gastritis without hemorrhage   6. Pernicious anemia   7. Microcytic anemia   Controlled, continue with current treatment.   8. Hypovitaminosis D   On vitamin D supplement, follow-up labs   9. Elevated alkaline phosphatase level   Mild, follow-up labs   10. LUCY (generalized anxiety disorder)   11. History of panic attacks   12. Major depression in remission (HCC)   Controlled, continue with current treatment.   13. Primary osteoarthritis of both knees   Controlled, continue with current treatment.   14.Health care maintenance   Due chingrix   Services suggested: No services needed at this time   Health Care Screening recommendations as per orders if indicated.   Referrals offered: PT/OT/Nutrition counseling/Behavioral Health/Smoking cessation as per orders if indicated.   Discussion today about general wellness and lifestyle habits:   Prevent falls and reduce trip hazards; Cautioned about securing or removing rugs.   Have a working fire alarm and carbon monoxide detector;   Engage in regular physical activity and social activities   Follow-up: in 3 months with labs   I attest that I saw this patient on this date and due to technical issues am not showing as the author of the note.

## 2020-03-18 ENCOUNTER — APPOINTMENT (OUTPATIENT)
Dept: RADIOLOGY | Facility: MEDICAL CENTER | Age: 70
End: 2020-03-18
Attending: INTERNAL MEDICINE
Payer: MEDICARE

## 2020-03-19 DIAGNOSIS — F32.5 MAJOR DEPRESSION IN REMISSION (HCC): ICD-10-CM

## 2020-03-19 DIAGNOSIS — F41.1 GAD (GENERALIZED ANXIETY DISORDER): ICD-10-CM

## 2020-03-19 DIAGNOSIS — Z86.59 HISTORY OF PANIC ATTACKS: ICD-10-CM

## 2020-03-19 RX ORDER — CITALOPRAM 20 MG/1
TABLET ORAL
Qty: 90 TAB | Refills: 0 | Status: SHIPPED | OUTPATIENT
Start: 2020-03-19 | End: 2020-05-13 | Stop reason: SDUPTHER

## 2020-03-19 NOTE — TELEPHONE ENCOUNTER
Received request via: Pharmacy    Was the patient seen in the last year in this department? Yes    Does the patient have an active prescription (recently filled or refills available) for medication(s) requested? YES

## 2020-03-29 ENCOUNTER — PATIENT MESSAGE (OUTPATIENT)
Dept: MEDICAL GROUP | Age: 70
End: 2020-03-29

## 2020-04-09 DIAGNOSIS — I10 ESSENTIAL HYPERTENSION: ICD-10-CM

## 2020-04-10 RX ORDER — LOSARTAN POTASSIUM 25 MG/1
TABLET ORAL
Qty: 90 TAB | Refills: 0 | Status: SHIPPED | OUTPATIENT
Start: 2020-04-10 | End: 2020-05-13 | Stop reason: SDUPTHER

## 2020-05-06 ENCOUNTER — HOSPITAL ENCOUNTER (OUTPATIENT)
Dept: LAB | Facility: MEDICAL CENTER | Age: 70
End: 2020-05-06
Attending: INTERNAL MEDICINE
Payer: MEDICARE

## 2020-05-06 DIAGNOSIS — R73.01 IFG (IMPAIRED FASTING GLUCOSE): ICD-10-CM

## 2020-05-06 DIAGNOSIS — R74.8 ELEVATED ALKALINE PHOSPHATASE IN NEWBORN: ICD-10-CM

## 2020-05-06 DIAGNOSIS — D51.0 PERNICIOUS ANEMIA: ICD-10-CM

## 2020-05-06 DIAGNOSIS — D50.9 MICROCYTIC ANEMIA: ICD-10-CM

## 2020-05-06 DIAGNOSIS — I10 ESSENTIAL HYPERTENSION: ICD-10-CM

## 2020-05-06 DIAGNOSIS — E78.5 DYSLIPIDEMIA: ICD-10-CM

## 2020-05-06 LAB
ALBUMIN SERPL BCP-MCNC: 4.1 G/DL (ref 3.2–4.9)
ALBUMIN/GLOB SERPL: 1.5 G/DL
ALP SERPL-CCNC: 128 U/L (ref 30–99)
ALT SERPL-CCNC: 9 U/L (ref 2–50)
ANION GAP SERPL CALC-SCNC: 12 MMOL/L (ref 7–16)
AST SERPL-CCNC: 10 U/L (ref 12–45)
BASOPHILS # BLD AUTO: 1.3 % (ref 0–1.8)
BASOPHILS # BLD: 0.08 K/UL (ref 0–0.12)
BILIRUB SERPL-MCNC: 0.5 MG/DL (ref 0.1–1.5)
BUN SERPL-MCNC: 8 MG/DL (ref 8–22)
CALCIUM SERPL-MCNC: 9 MG/DL (ref 8.5–10.5)
CHLORIDE SERPL-SCNC: 102 MMOL/L (ref 96–112)
CHOLEST SERPL-MCNC: 177 MG/DL (ref 100–199)
CO2 SERPL-SCNC: 24 MMOL/L (ref 20–33)
CREAT SERPL-MCNC: 0.54 MG/DL (ref 0.5–1.4)
EOSINOPHIL # BLD AUTO: 0.16 K/UL (ref 0–0.51)
EOSINOPHIL NFR BLD: 2.7 % (ref 0–6.9)
ERYTHROCYTE [DISTWIDTH] IN BLOOD BY AUTOMATED COUNT: 41.6 FL (ref 35.9–50)
FASTING STATUS PATIENT QL REPORTED: NORMAL
FERRITIN SERPL-MCNC: 88.4 NG/ML (ref 10–291)
GLOBULIN SER CALC-MCNC: 2.8 G/DL (ref 1.9–3.5)
GLUCOSE SERPL-MCNC: 109 MG/DL (ref 65–99)
HCT VFR BLD AUTO: 49.1 % (ref 37–47)
HDLC SERPL-MCNC: 50 MG/DL
HGB BLD-MCNC: 16.4 G/DL (ref 12–16)
IMM GRANULOCYTES # BLD AUTO: 0.01 K/UL (ref 0–0.11)
IMM GRANULOCYTES NFR BLD AUTO: 0.2 % (ref 0–0.9)
IRON SATN MFR SERPL: 31 % (ref 15–55)
IRON SERPL-MCNC: 90 UG/DL (ref 40–170)
LDLC SERPL CALC-MCNC: 92 MG/DL
LYMPHOCYTES # BLD AUTO: 2.01 K/UL (ref 1–4.8)
LYMPHOCYTES NFR BLD: 33.7 % (ref 22–41)
MCH RBC QN AUTO: 28.8 PG (ref 27–33)
MCHC RBC AUTO-ENTMCNC: 33.4 G/DL (ref 33.6–35)
MCV RBC AUTO: 86.3 FL (ref 81.4–97.8)
MONOCYTES # BLD AUTO: 0.48 K/UL (ref 0–0.85)
MONOCYTES NFR BLD AUTO: 8 % (ref 0–13.4)
NEUTROPHILS # BLD AUTO: 3.23 K/UL (ref 2–7.15)
NEUTROPHILS NFR BLD: 54.1 % (ref 44–72)
NRBC # BLD AUTO: 0 K/UL
NRBC BLD-RTO: 0 /100 WBC
PLATELET # BLD AUTO: 320 K/UL (ref 164–446)
PMV BLD AUTO: 9.7 FL (ref 9–12.9)
POTASSIUM SERPL-SCNC: 3.9 MMOL/L (ref 3.6–5.5)
PROT SERPL-MCNC: 6.9 G/DL (ref 6–8.2)
RBC # BLD AUTO: 5.69 M/UL (ref 4.2–5.4)
SODIUM SERPL-SCNC: 138 MMOL/L (ref 135–145)
TIBC SERPL-MCNC: 291 UG/DL (ref 250–450)
TRIGL SERPL-MCNC: 173 MG/DL (ref 0–149)
UIBC SERPL-MCNC: 201 UG/DL (ref 110–370)
VIT B12 SERPL-MCNC: 1036 PG/ML (ref 211–911)
WBC # BLD AUTO: 6 K/UL (ref 4.8–10.8)

## 2020-05-06 PROCEDURE — 80061 LIPID PANEL: CPT

## 2020-05-06 PROCEDURE — 82728 ASSAY OF FERRITIN: CPT

## 2020-05-06 PROCEDURE — 84075 ASSAY ALKALINE PHOSPHATASE: CPT | Mod: XU

## 2020-05-06 PROCEDURE — 83550 IRON BINDING TEST: CPT

## 2020-05-06 PROCEDURE — 36415 COLL VENOUS BLD VENIPUNCTURE: CPT

## 2020-05-06 PROCEDURE — 84080 ASSAY ALKALINE PHOSPHATASES: CPT

## 2020-05-06 PROCEDURE — 83540 ASSAY OF IRON: CPT

## 2020-05-06 PROCEDURE — 85025 COMPLETE CBC W/AUTO DIFF WBC: CPT

## 2020-05-06 PROCEDURE — 82607 VITAMIN B-12: CPT

## 2020-05-06 PROCEDURE — 80053 COMPREHEN METABOLIC PANEL: CPT

## 2020-05-08 LAB
ALP BONE SERPL-CCNC: 72 U/L (ref 0–55)
ALP ISOS SERPL HS-CCNC: 0 U/L
ALP LIVER SERPL-CCNC: 63 U/L (ref 0–94)
ALP SERPL-CCNC: 135 U/L (ref 40–120)

## 2020-05-12 NOTE — PROGRESS NOTES
CHIEF COMPLAINT  HTN, labs    HPI  Mini Pham is a 70 y.o. female who presents today for the following     Hypertension  Meds: Irbesartan 300 mg QD, HCTZ, 25 mg daily, taking as prescribed.   Measuring BP at home: no  Denies: - headaches, vision problems, tinnitus.  - chest pain/pressure, palpitations, irregular heart beats, exertional, dyspnea, peripheral edema.  Low salt diet: advised  Diet: regular  Exercise: limited  BMI: 40  FH of HTN: unknown  Reviewed renal panel.     Dyslipidemia  The patient is on atorvastatin, 10 mg, taking daily, as prescribed. No myalgias, muscle cramps or pain.   Diet /Exercise/BMI: As above  FH: sister     IFG  The patient had elevated FBG.  No polydipsia, polyphagia, polyuria.  No abdominal pain, weight loss, fatigue.  Diet/exercise/BMI: As above  FH of DM: MGF, sister     Atrophic gastritis, Pernicious anemia  The patient was diagnosed with atrophic gastritis on EGD  Labs showed microcytic anemia, controlled/normal B12 level, high PLT count improved.  Placed on vitamin B12 OTC, improved levels of B12 and CBC.  Reviewed labs.     Hypovitaminosis D, Elevated AP  The patient had low vitamin D level at.  Vitamin D supplement: no.  Labs also showed elevated alkaline phosphatase, slightly increasing:   Ref. Range 5/6/2020    AP L40 - 120 U/L 135 (H)   Bone Fraction 0 - 55 U/L 72 (H)   Liver Fractions : 0 - 94 U/L 63     Anxiety, h/o panic attacks, depression  Onset:  Since her 20'  Course: stable  Mood/anxiety currently does not affect: daily activities/sleep.  Current treatment: celexa     Risk factors:   • Depression, anxiety  • H/o phobia: no  • H/o panic attacks: no  • H/o hypomanic or manic episode: no  • Substance abuse  (alcohol,  prescription drugs caffeine, tobacco): no  • Family support: yes  • Living alone:  no  • Family history of psych disorders: sisters x 2  • Stress: no  • PMH of abuse (sexual, physical, emotional abuse; neglect): no    Depression Screen (PHQ-2/PHQ-9)  2/13/2020 2/13/2020 3/12/2020   PHQ-2 Total Score - - -   PHQ-2 Total Score - 0 -   PHQ-2 Total Score 0 - 0   PHQ-9 Total Score - 0 -     Reviewed PMH, PSH, FH, SH, ALL, HCM/IMM, no changes  Reviewed MEDS, no changes    Patient Active Problem List    Diagnosis Date Noted   • Elevated alkaline phosphatase level 02/13/2020   • Hypovitaminosis D 02/13/2020   • BMI 40.0-44.9, adult ([facility]) 10/08/2019   • Major depression in remission ([facility]) 10/08/2019   • LUCY (generalized anxiety disorder) 10/08/2019   • Atrophic gastritis without hemorrhage 10/08/2019   • Thrombocytosis ([facility]) 10/08/2019   • Microcytic anemia 10/08/2019   • Essential hypertension 02/25/2019   • Dyslipidemia 02/25/2019   • IFG (impaired fasting glucose) 02/25/2019   • Primary osteoarthritis of both knees 02/25/2019   • Pernicious anemia 02/25/2019   • Health care maintenance 02/25/2019     CURRENT MEDICATIONS  Current Outpatient Medications   Medication Sig Dispense Refill   • losartan (COZAAR) 25 MG Tab TAKE 2 TABLETS DAILY 90 Tab 0   • citalopram (CELEXA) 20 MG Tab TAKE 1 TABLET DAILY 90 Tab 0   • vitamin D, Ergocalciferol, (DRISDOL) 1.25 MG (49326 UT) Cap capsule Take 1 Cap by mouth every 7 days. 12 Cap 0   • atorvastatin (LIPITOR) 10 MG Tab Take 1 Tab by mouth every day. 90 Tab 1   • cyanocobalamin (VITAMIN B-12) 1000 MCG/ML Solution INJECT 1ML INTRAMUSCULARLY EVERY 30 DAYS. DISCARD 28  DAYS AFTER FIRST USE (Patient not taking: Reported on 3/12/2020) 3 mL 0   • Cyanocobalamin (B-12) 2500 MCG Tab Take 1 Tab by mouth every day. 90 Tab 1   • PNEUMOVAX 23 25 MCG/0.5ML Injection        No current facility-administered medications for this visit.      ALLERGIES  Allergies: Patient has no known allergies.  PAST MEDICAL HISTORY  Past Medical History:   Diagnosis Date   • Anemia, pernicious    • Anxiety    • Atrophic gastritis    • Hyperlipidemia    • Hypertension    • Osteoarthritis    • Prediabetes     fasting glucose 102   • Tendonitis     chronic, elbows and  wrists     SURGICAL HISTORY  She  has a past surgical history that includes cholecystectomy ().  SOCIAL HISTORY  Social History     Tobacco Use   • Smoking status: Former Smoker     Packs/day: 1.00     Years: 30.00     Pack years: 30.00     Types: Cigarettes     Last attempt to quit:      Years since quittin.3   • Smokeless tobacco: Never Used   Substance Use Topics   • Alcohol use: Yes     Alcohol/week: 0.6 oz     Types: 1 Glasses of wine per week     Frequency: 2-3 times a week     Drinks per session: 1 or 2     Binge frequency: Never     Comment: 3 times/week   • Drug use: Yes     Types: Cocaine, Marijuana     Comment: Remote, 50 years ago     Social History     Social History Narrative   • Not on file     FAMILY HISTORY  Family History   Problem Relation Age of Onset   • Alzheimer's Disease Mother 50        early onset   • Cancer Father 50        laryngeal, mets to bladder   • Heart Attack Sister 56   • Diabetes Sister    • Hyperlipidemia Sister    • Depression Sister    • Cancer Maternal Aunt 50        colon   • Colon Cancer Sister 69   • Depression Sister    • No Known Problems Maternal Grandmother    • Diabetes Maternal Grandfather         late onset   • No Known Problems Paternal Grandmother    • No Known Problems Paternal Grandfather    • Hypertension Neg Hx      Family Status   Relation Name Status   • Mo  (Not Specified)   • Fa  (Not Specified)   • Sis Brisa    • MAunt     • Sis Marybeth Alive   • MGMo     • MGFa     • PGMo     • PGFa     • Neg Hx  (Not Specified)     ROS   Constitutional: Negative for fever, chills.  HENT: Negative for congestion, sore throat.  Eyes: Negative for vision problems.   Respiratory: Negative for cough, shortness of breath.  Cardiovascular: Negative for chest pain, palpitations.   Gastrointestinal: Negative for heartburn, nausea, abdominal pain.   Genitourinary: Negative for dysuria.  Musculoskeletal: Negative  "for significant myalgia, back and joint pain.   Skin: Negative for rash.   Neuro: Negative for dizziness, weakness and headaches.   Endo/Heme/Allergies: Does not bruise/bleed easily.   Psychiatric/Behavioral: Negative for depression.    PHYSICAL EXAM   Blood Pressure 140/82 (BP Location: Left arm, Patient Position: Sitting, BP Cuff Size: Large adult)   Pulse 66   Temperature (Abnormal) 35.7 °C (96.3 °F) (Temporal)   Height 1.657 m (5' 5.25\")   Weight 109.5 kg (241 lb 6.4 oz)   Oxygen Saturation 96%   Body Mass Index 39.86 kg/m²   General:  NAD, well appearing  HEENT:   NC/AT, PERRLA, EOMI.  Cardiovascular: unlabored breathing, no peripheral cyanosis.  Lungs:   no respiratory distress.  Abdomen: Obese, ND.  Extremities:  No peripheral swelling  Skin:  Warm, dry.  No erythema. No rash.   Neurologic: Alert & oriented x 3. CN II-XII grossly intact. No focal deficits.  Psychiatric:  Affect normal, mood normal, judgment normal.    Labs     Labs are reviewed and discussed with a patient  Lab Results   Component Value Date/Time    CHOLSTRLTOT 177 05/06/2020 08:24 AM    LDL 92 05/06/2020 08:24 AM    HDL 50 05/06/2020 08:24 AM    TRIGLYCERIDE 173 (H) 05/06/2020 08:24 AM       Lab Results   Component Value Date/Time    SODIUM 138 05/06/2020 08:24 AM    POTASSIUM 3.9 05/06/2020 08:24 AM    CHLORIDE 102 05/06/2020 08:24 AM    CO2 24 05/06/2020 08:24 AM    GLUCOSE 109 (H) 05/06/2020 08:24 AM    BUN 8 05/06/2020 08:24 AM    CREATININE 0.54 05/06/2020 08:24 AM     Lab Results   Component Value Date/Time    ALKPHOSPHAT 128 (H) 05/06/2020 08:24 AM    ASTSGOT 10 (L) 05/06/2020 08:24 AM    ALTSGPT 9 05/06/2020 08:24 AM    TBILIRUBIN 0.5 05/06/2020 08:24 AM      Lab Results   Component Value Date/Time    HBA1C 5.4 10/02/2019 08:42 AM    HBA1C 5.7 (H) 03/12/2019 10:41 AM     No results found for: TSH  No results found for: FREET4    Lab Results   Component Value Date/Time    WBC 6.0 05/06/2020 08:24 AM    RBC 5.69 (H) 05/06/2020 " 08:24 AM    HEMOGLOBIN 16.4 (H) 05/06/2020 08:24 AM    HEMATOCRIT 49.1 (H) 05/06/2020 08:24 AM    MCV 86.3 05/06/2020 08:24 AM    MCH 28.8 05/06/2020 08:24 AM    MCHC 33.4 (L) 05/06/2020 08:24 AM    MPV 9.7 05/06/2020 08:24 AM    NEUTSPOLYS 54.10 05/06/2020 08:24 AM    LYMPHOCYTES 33.70 05/06/2020 08:24 AM    MONOCYTES 8.00 05/06/2020 08:24 AM    EOSINOPHILS 2.70 05/06/2020 08:24 AM    BASOPHILS 1.30 05/06/2020 08:24 AM    HYPOCHROMIA 1+ 10/02/2019 08:42 AM    ANISOCYTOSIS 2+ 02/07/2020 09:32 AM      Imaging     None    Assessment and Plan     Mini Pham is a 70 y.o. female    1. Essential hypertension  Controlled, continue with current treatment.  - Comp Metabolic Panel; Future  - losartan (COZAAR) 25 MG Tab; TAKE 2 TABLETS DAILY  Dispense: 90 Tab; Refill: 1    2. Dyslipidemia  Controlled, continue with current treatment.  - Comp Metabolic Panel; Future  - Lipid Profile; Future  - atorvastatin (LIPITOR) 10 MG Tab; Take 1 Tab by mouth every day.  Dispense: 90 Tab; Refill: 1    3. IFG (impaired fasting glucose)  Discussed about risk to develop DM.   Advised low carb diet, exercise, watch for WT.   - Comp Metabolic Panel; Future  - HEMOGLOBIN A1C; Future    4. Atrophic gastritis without hemorrhage  Controlled, continue with current treatment.  - CBC WITH DIFFERENTIAL; Future  - VITAMIN B12; Future  5. Pernicious anemia  - CBC WITH DIFFERENTIAL; Future  - VITAMIN B12; Future  6. B12 deficiency  As above.    Hypovitaminosis D  Improved, continue current tx  - VITAMIN D,25 HYDROXY; Future    7. LUCY (generalized anxiety disorder)  Controlled, continue with current treatment.  - citalopram (CELEXA) 20 MG Tab; Take 1 Tab by mouth every day.  Dispense: 90 Tab; Refill: 1  8. History of panic attacks  - citalopram (CELEXA) 20 MG Tab; Take 1 Tab by mouth every day.  Dispense: 90 Tab; Refill: 1  9. Major depression in remission ([facility])  - citalopram (CELEXA) 20 MG Tab; Take 1 Tab by mouth every day.  Dispense: 90 Tab; Refill:  1    Counseling:   - Smoking:  Nonsmoker    Followup: in 3 months and prn    All questions are answered.    Please note that this dictation was created using voice recognition software, and that there might be errors of angelina and possibly content.

## 2020-05-13 ENCOUNTER — OFFICE VISIT (OUTPATIENT)
Dept: MEDICAL GROUP | Age: 70
End: 2020-05-13
Payer: MEDICARE

## 2020-05-13 VITALS
DIASTOLIC BLOOD PRESSURE: 82 MMHG | SYSTOLIC BLOOD PRESSURE: 140 MMHG | TEMPERATURE: 96.3 F | HEIGHT: 65 IN | BODY MASS INDEX: 40.22 KG/M2 | HEART RATE: 66 BPM | WEIGHT: 241.4 LBS | OXYGEN SATURATION: 96 %

## 2020-05-13 DIAGNOSIS — D51.0 PERNICIOUS ANEMIA: ICD-10-CM

## 2020-05-13 DIAGNOSIS — E53.8 B12 DEFICIENCY: ICD-10-CM

## 2020-05-13 DIAGNOSIS — K29.40 ATROPHIC GASTRITIS WITHOUT HEMORRHAGE: ICD-10-CM

## 2020-05-13 DIAGNOSIS — F32.5 MAJOR DEPRESSION IN REMISSION (HCC): ICD-10-CM

## 2020-05-13 DIAGNOSIS — R73.01 IMPAIRED FASTING GLUCOSE: ICD-10-CM

## 2020-05-13 DIAGNOSIS — Z86.59 HISTORY OF PANIC ATTACKS: ICD-10-CM

## 2020-05-13 DIAGNOSIS — E78.5 DYSLIPIDEMIA: ICD-10-CM

## 2020-05-13 DIAGNOSIS — E55.9 HYPOVITAMINOSIS D: ICD-10-CM

## 2020-05-13 DIAGNOSIS — F41.1 GAD (GENERALIZED ANXIETY DISORDER): ICD-10-CM

## 2020-05-13 DIAGNOSIS — I10 ESSENTIAL HYPERTENSION: ICD-10-CM

## 2020-05-13 PROCEDURE — 99214 OFFICE O/P EST MOD 30 MIN: CPT | Performed by: INTERNAL MEDICINE

## 2020-05-13 RX ORDER — ATORVASTATIN CALCIUM 10 MG/1
10 TABLET, FILM COATED ORAL DAILY
Qty: 90 TAB | Refills: 1 | Status: SHIPPED | OUTPATIENT
Start: 2020-05-13 | End: 2020-09-14

## 2020-05-13 RX ORDER — CITALOPRAM 20 MG/1
20 TABLET ORAL DAILY
Qty: 90 TAB | Refills: 1 | Status: SHIPPED | OUTPATIENT
Start: 2020-05-13 | End: 2020-09-01 | Stop reason: SDUPTHER

## 2020-05-13 RX ORDER — LOSARTAN POTASSIUM 25 MG/1
TABLET ORAL
Qty: 90 TAB | Refills: 1 | Status: SHIPPED | OUTPATIENT
Start: 2020-05-13 | End: 2020-09-14

## 2020-05-13 ASSESSMENT — FIBROSIS 4 INDEX: FIB4 SCORE: .7083333333333333333

## 2020-07-01 DIAGNOSIS — I10 ESSENTIAL HYPERTENSION: ICD-10-CM

## 2020-07-01 RX ORDER — LOSARTAN POTASSIUM 50 MG/1
TABLET ORAL
Qty: 90 TAB | Refills: 0 | Status: SHIPPED | OUTPATIENT
Start: 2020-07-01 | End: 2020-09-14 | Stop reason: SDUPTHER

## 2020-09-01 DIAGNOSIS — Z86.59 HISTORY OF PANIC ATTACKS: ICD-10-CM

## 2020-09-01 DIAGNOSIS — F32.5 MAJOR DEPRESSION IN REMISSION (HCC): ICD-10-CM

## 2020-09-01 DIAGNOSIS — F41.1 GAD (GENERALIZED ANXIETY DISORDER): ICD-10-CM

## 2020-09-01 RX ORDER — CITALOPRAM 20 MG/1
20 TABLET ORAL DAILY
Qty: 90 TAB | Refills: 0 | Status: SHIPPED | OUTPATIENT
Start: 2020-09-01 | End: 2020-11-13

## 2020-09-02 ENCOUNTER — HOSPITAL ENCOUNTER (OUTPATIENT)
Dept: LAB | Facility: MEDICAL CENTER | Age: 70
End: 2020-09-02
Attending: INTERNAL MEDICINE
Payer: MEDICARE

## 2020-09-02 DIAGNOSIS — E78.5 DYSLIPIDEMIA: ICD-10-CM

## 2020-09-02 DIAGNOSIS — E55.9 HYPOVITAMINOSIS D: ICD-10-CM

## 2020-09-02 DIAGNOSIS — D51.0 PERNICIOUS ANEMIA: ICD-10-CM

## 2020-09-02 DIAGNOSIS — R73.01 IMPAIRED FASTING GLUCOSE: ICD-10-CM

## 2020-09-02 DIAGNOSIS — K29.40 ATROPHIC GASTRITIS WITHOUT HEMORRHAGE: ICD-10-CM

## 2020-09-02 DIAGNOSIS — I10 ESSENTIAL HYPERTENSION: ICD-10-CM

## 2020-09-02 LAB
25(OH)D3 SERPL-MCNC: 42 NG/ML (ref 30–100)
ALBUMIN SERPL BCP-MCNC: 4 G/DL (ref 3.2–4.9)
ALBUMIN SERPL BCP-MCNC: 4.1 G/DL (ref 3.2–4.9)
ALBUMIN/GLOB SERPL: 1.4 G/DL
ALBUMIN/GLOB SERPL: 1.5 G/DL
ALP SERPL-CCNC: 107 U/L (ref 30–99)
ALP SERPL-CCNC: 109 U/L (ref 30–99)
ALT SERPL-CCNC: 10 U/L (ref 2–50)
ALT SERPL-CCNC: 7 U/L (ref 2–50)
ANION GAP SERPL CALC-SCNC: 13 MMOL/L (ref 7–16)
ANION GAP SERPL CALC-SCNC: 13 MMOL/L (ref 7–16)
AST SERPL-CCNC: 9 U/L (ref 12–45)
AST SERPL-CCNC: 9 U/L (ref 12–45)
BASOPHILS # BLD AUTO: 1.4 % (ref 0–1.8)
BASOPHILS # BLD: 0.08 K/UL (ref 0–0.12)
BILIRUB SERPL-MCNC: 0.4 MG/DL (ref 0.1–1.5)
BILIRUB SERPL-MCNC: 0.4 MG/DL (ref 0.1–1.5)
BUN SERPL-MCNC: 8 MG/DL (ref 8–22)
BUN SERPL-MCNC: 8 MG/DL (ref 8–22)
CALCIUM SERPL-MCNC: 9.1 MG/DL (ref 8.5–10.5)
CALCIUM SERPL-MCNC: 9.2 MG/DL (ref 8.5–10.5)
CHLORIDE SERPL-SCNC: 101 MMOL/L (ref 96–112)
CHLORIDE SERPL-SCNC: 103 MMOL/L (ref 96–112)
CHOLEST SERPL-MCNC: 171 MG/DL (ref 100–199)
CO2 SERPL-SCNC: 23 MMOL/L (ref 20–33)
CO2 SERPL-SCNC: 23 MMOL/L (ref 20–33)
CREAT SERPL-MCNC: 0.47 MG/DL (ref 0.5–1.4)
CREAT SERPL-MCNC: 0.5 MG/DL (ref 0.5–1.4)
EOSINOPHIL # BLD AUTO: 0.16 K/UL (ref 0–0.51)
EOSINOPHIL NFR BLD: 2.9 % (ref 0–6.9)
ERYTHROCYTE [DISTWIDTH] IN BLOOD BY AUTOMATED COUNT: 40.1 FL (ref 35.9–50)
EST. AVERAGE GLUCOSE BLD GHB EST-MCNC: 111 MG/DL
FASTING STATUS PATIENT QL REPORTED: NORMAL
FASTING STATUS PATIENT QL REPORTED: NORMAL
FERRITIN SERPL-MCNC: 73.3 NG/ML (ref 10–291)
FOLATE SERPL-MCNC: 11.7 NG/ML
GLOBULIN SER CALC-MCNC: 2.8 G/DL (ref 1.9–3.5)
GLOBULIN SER CALC-MCNC: 2.9 G/DL (ref 1.9–3.5)
GLUCOSE SERPL-MCNC: 91 MG/DL (ref 65–99)
GLUCOSE SERPL-MCNC: 92 MG/DL (ref 65–99)
HBA1C MFR BLD: 5.5 % (ref 0–5.6)
HCT VFR BLD AUTO: 47.5 % (ref 37–47)
HDLC SERPL-MCNC: 45 MG/DL
HGB BLD-MCNC: 15.6 G/DL (ref 12–16)
IMM GRANULOCYTES # BLD AUTO: 0.01 K/UL (ref 0–0.11)
IMM GRANULOCYTES NFR BLD AUTO: 0.2 % (ref 0–0.9)
LDLC SERPL CALC-MCNC: 92 MG/DL
LYMPHOCYTES # BLD AUTO: 1.63 K/UL (ref 1–4.8)
LYMPHOCYTES NFR BLD: 29.5 % (ref 22–41)
MCH RBC QN AUTO: 28.8 PG (ref 27–33)
MCHC RBC AUTO-ENTMCNC: 32.8 G/DL (ref 33.6–35)
MCV RBC AUTO: 87.6 FL (ref 81.4–97.8)
MONOCYTES # BLD AUTO: 0.46 K/UL (ref 0–0.85)
MONOCYTES NFR BLD AUTO: 8.3 % (ref 0–13.4)
NEUTROPHILS # BLD AUTO: 3.18 K/UL (ref 2–7.15)
NEUTROPHILS NFR BLD: 57.7 % (ref 44–72)
NRBC # BLD AUTO: 0 K/UL
NRBC BLD-RTO: 0 /100 WBC
PLATELET # BLD AUTO: 329 K/UL (ref 164–446)
PMV BLD AUTO: 9.8 FL (ref 9–12.9)
POTASSIUM SERPL-SCNC: 3.8 MMOL/L (ref 3.6–5.5)
POTASSIUM SERPL-SCNC: 3.9 MMOL/L (ref 3.6–5.5)
PROT SERPL-MCNC: 6.9 G/DL (ref 6–8.2)
PROT SERPL-MCNC: 6.9 G/DL (ref 6–8.2)
RBC # BLD AUTO: 5.42 M/UL (ref 4.2–5.4)
SODIUM SERPL-SCNC: 137 MMOL/L (ref 135–145)
SODIUM SERPL-SCNC: 139 MMOL/L (ref 135–145)
TRIGL SERPL-MCNC: 172 MG/DL (ref 0–149)
VIT B12 SERPL-MCNC: 1005 PG/ML (ref 211–911)
VIT B12 SERPL-MCNC: 1021 PG/ML (ref 211–911)
WBC # BLD AUTO: 5.5 K/UL (ref 4.8–10.8)

## 2020-09-02 PROCEDURE — 83036 HEMOGLOBIN GLYCOSYLATED A1C: CPT | Mod: GA

## 2020-09-02 PROCEDURE — 82607 VITAMIN B-12: CPT | Mod: 91

## 2020-09-02 PROCEDURE — 82306 VITAMIN D 25 HYDROXY: CPT

## 2020-09-02 PROCEDURE — 82728 ASSAY OF FERRITIN: CPT

## 2020-09-02 PROCEDURE — 82607 VITAMIN B-12: CPT

## 2020-09-02 PROCEDURE — 80053 COMPREHEN METABOLIC PANEL: CPT | Mod: 91

## 2020-09-02 PROCEDURE — 36415 COLL VENOUS BLD VENIPUNCTURE: CPT

## 2020-09-02 PROCEDURE — 80053 COMPREHEN METABOLIC PANEL: CPT

## 2020-09-02 PROCEDURE — 85025 COMPLETE CBC W/AUTO DIFF WBC: CPT

## 2020-09-02 PROCEDURE — 82746 ASSAY OF FOLIC ACID SERUM: CPT

## 2020-09-02 PROCEDURE — 80061 LIPID PANEL: CPT

## 2020-09-14 ENCOUNTER — OFFICE VISIT (OUTPATIENT)
Dept: MEDICAL GROUP | Age: 70
End: 2020-09-14
Payer: MEDICARE

## 2020-09-14 VITALS
HEART RATE: 74 BPM | BODY MASS INDEX: 39.72 KG/M2 | WEIGHT: 238.4 LBS | OXYGEN SATURATION: 95 % | HEIGHT: 65 IN | SYSTOLIC BLOOD PRESSURE: 132 MMHG | TEMPERATURE: 96.8 F | DIASTOLIC BLOOD PRESSURE: 94 MMHG

## 2020-09-14 DIAGNOSIS — R74.8 ELEVATED ALKALINE PHOSPHATASE LEVEL: ICD-10-CM

## 2020-09-14 DIAGNOSIS — E55.9 HYPOVITAMINOSIS D: ICD-10-CM

## 2020-09-14 DIAGNOSIS — K29.40 ATROPHIC GASTRITIS WITHOUT HEMORRHAGE: ICD-10-CM

## 2020-09-14 DIAGNOSIS — I10 ESSENTIAL HYPERTENSION: ICD-10-CM

## 2020-09-14 DIAGNOSIS — D51.0 PERNICIOUS ANEMIA: ICD-10-CM

## 2020-09-14 DIAGNOSIS — R73.01 IMPAIRED FASTING GLUCOSE: ICD-10-CM

## 2020-09-14 DIAGNOSIS — E78.5 DYSLIPIDEMIA: ICD-10-CM

## 2020-09-14 DIAGNOSIS — Z12.31 ENCOUNTER FOR SCREENING MAMMOGRAM FOR MALIGNANT NEOPLASM OF BREAST: ICD-10-CM

## 2020-09-14 PROCEDURE — 99214 OFFICE O/P EST MOD 30 MIN: CPT | Performed by: INTERNAL MEDICINE

## 2020-09-14 RX ORDER — ERGOCALCIFEROL 1.25 MG/1
50000 CAPSULE ORAL
Qty: 12 CAP | Refills: 1 | Status: SHIPPED | OUTPATIENT
Start: 2020-09-14 | End: 2021-03-10

## 2020-09-14 RX ORDER — LOSARTAN POTASSIUM 50 MG/1
TABLET ORAL
Qty: 135 TAB | Refills: 0 | Status: SHIPPED | OUTPATIENT
Start: 2020-09-14 | End: 2020-11-25

## 2020-09-14 RX ORDER — LOSARTAN POTASSIUM 50 MG/1
TABLET ORAL
Qty: 90 TAB | Refills: 0 | Status: SHIPPED | OUTPATIENT
Start: 2020-09-14 | End: 2020-09-18

## 2020-09-14 RX ORDER — ATORVASTATIN CALCIUM 10 MG/1
10 TABLET, FILM COATED ORAL DAILY
Qty: 90 TAB | Refills: 3 | Status: SHIPPED | OUTPATIENT
Start: 2020-09-14 | End: 2021-08-27

## 2020-09-14 ASSESSMENT — FIBROSIS 4 INDEX: FIB4 SCORE: 0.7

## 2020-09-14 NOTE — PROGRESS NOTES
CHIEF COMPLAINT  Chief Complaint   Patient presents with   • Lab Results   • Follow-Up     4 months   HTN    HPI  Mini Pham is a 70 y.o. female who presents today for the following     Hypertension  Meds: losartan, 50 mg - taking 75 mg QD   -  taking as prescribed.   Measuring BP at home: no  Denies: - headaches, vision problems, tinnitus.  - chest pain/pressure, palpitations, irregular heart beats, exertional, dyspnea, peripheral edema.  Low salt diet: advised  Diet: regular  Exercise: limited  BMI: 40  FH of HTN: unknown  Reviewed renal panel.     Dyslipidemia  The patient is on atorvastatin, 10 mg, taking daily, as prescribed. No myalgias, muscle cramps or pain.   Diet /Exercise/BMI: As above  FH: sister     IFG  The patient had elevated FBG.  No polydipsia, polyphagia, polyuria.  No abdominal pain, weight loss, fatigue.  Diet/exercise/BMI: As above  FH of DM: MGF, sister     Atrophic gastritis, Pernicious anemia  The patient was diagnosed with atrophic gastritis on EGD  Labs showed microcytic anemia, controlled/normal B12 level, high PLT count improved.  Placed on vitamin B12 OTC 2500 mcg Q other day   -  improved B12 level and CBC.  Reviewed labs.     Hypovitaminosis D, Elevated AP  The patient had low vitamin D level at.  Vitamin D supplement: was on high dose.  Labs also showed elevated alkaline phosphatase, slightly increasing:    Ref. Range 5/6/2020    AP L40 - 120 U/L 135 (H)   Bone Fraction 0 - 55 U/L 72 (H)   Liver Fractions : 0 - 94 U/L 63       Reviewed PMH, PSH, FH, SH, ALL, HCM/IMM, no changes  Reviewed MEDS, no changes    Patient Active Problem List    Diagnosis Date Noted   • Elevated alkaline phosphatase level 02/13/2020   • Hypovitaminosis D 02/13/2020   • BMI 40.0-44.9, adult ([facility]) 10/08/2019   • Major depression in remission ([facility]) 10/08/2019   • LUCY (generalized anxiety disorder) 10/08/2019   • Atrophic gastritis without hemorrhage 10/08/2019   • Thrombocytosis ([facility]) 10/08/2019   •  Microcytic anemia 10/08/2019   • Essential hypertension 2019   • Dyslipidemia 2019   • IFG (impaired fasting glucose) 2019   • Primary osteoarthritis of both knees 2019   • Pernicious anemia 2019   • Health care maintenance 2019     CURRENT MEDICATIONS  Current Outpatient Medications   Medication Sig Dispense Refill   • citalopram (CELEXA) 20 MG Tab Take 1 Tab by mouth every day. 90 Tab 0   • losartan (COZAAR) 50 MG Tab TAKE 1 TABLET DAILY 90 Tab 0   • losartan (COZAAR) 25 MG Tab TAKE 2 TABLETS DAILY 90 Tab 1   • atorvastatin (LIPITOR) 10 MG Tab Take 1 Tab by mouth every day. 90 Tab 1   • cyanocobalamin (VITAMIN B-12) 1000 MCG/ML Solution INJECT 1ML INTRAMUSCULARLY EVERY 30 DAYS. DISCARD 28  DAYS AFTER FIRST USE (Patient not taking: Reported on 3/12/2020) 3 mL 0   • Cyanocobalamin (B-12) 2500 MCG Tab Take 1 Tab by mouth every day. 90 Tab 1   • PNEUMOVAX 23 25 MCG/0.5ML Injection        No current facility-administered medications for this visit.      ALLERGIES  Allergies: Patient has no known allergies.  PAST MEDICAL HISTORY  Past Medical History:   Diagnosis Date   • Anemia, pernicious    • Anxiety    • Atrophic gastritis    • Hyperlipidemia    • Hypertension    • Osteoarthritis    • Prediabetes     fasting glucose 102   • Tendonitis     chronic, elbows and wrists     SURGICAL HISTORY  She  has a past surgical history that includes cholecystectomy ().  SOCIAL HISTORY  Social History     Tobacco Use   • Smoking status: Former Smoker     Packs/day: 1.00     Years: 30.00     Pack years: 30.00     Types: Cigarettes     Quit date:      Years since quittin.7   • Smokeless tobacco: Never Used   Substance Use Topics   • Alcohol use: Yes     Alcohol/week: 0.6 oz     Types: 1 Glasses of wine per week     Frequency: 2-3 times a week     Drinks per session: 1 or 2     Binge frequency: Never     Comment: 3 times/week   • Drug use: Yes     Types: Cocaine, Marijuana     Comment:  "Remote, 50 years ago     Social History     Social History Narrative   • Not on file     FAMILY HISTORY  Family History   Problem Relation Age of Onset   • Alzheimer's Disease Mother 50        early onset   • Cancer Father 50        laryngeal, mets to bladder   • Heart Attack Sister 56   • Diabetes Sister    • Hyperlipidemia Sister    • Depression Sister    • Cancer Maternal Aunt 50        colon   • Colon Cancer Sister 69   • Depression Sister    • No Known Problems Maternal Grandmother    • Diabetes Maternal Grandfather         late onset   • No Known Problems Paternal Grandmother    • No Known Problems Paternal Grandfather    • Hypertension Neg Hx      Family Status   Relation Name Status   • Mo  (Not Specified)   • Fa  (Not Specified)   • Mónica Ledezma    • MAnéstor     • Mónica Rueda Alive   • MGMo     • MGFa     • PGMo     • PGFa     • Neg Hx  (Not Specified)       ROS   Constitutional: Negative for fever, chills, fatigue.  HENT: Negative for congestion, sore throat.  Eyes: Negative for vision problems.   Respiratory: Negative for cough, shortness of breath.  Cardiovascular: Negative for chest pain, palpitations.   Gastrointestinal: Negative for heartburn, nausea, abdominal pain.   Genitourinary: Negative for dysuria.  Musculoskeletal: Negative for significant myalgia, back and joint pain.   Skin: Negative for rash.   Neuro: Negative for dizziness, weakness and headaches.   Endo/Heme/Allergies: Does not bruise/bleed easily.   Psychiatric/Behavioral: Negative for depression.    PHYSICAL EXAM   Blood Pressure 132/94 (BP Location: Right arm, Patient Position: Sitting, BP Cuff Size: Adult long)   Pulse 74   Temperature 36 °C (96.8 °F) (Temporal)   Height 1.651 m (5' 5\")   Weight 108.1 kg (238 lb 6.4 oz)   Oxygen Saturation 95%  Body mass index is 39.67 kg/m².  General:  NAD, well appearing  HEENT:   NC/AT, PERRLA, EOMI.  Cardiovascular: unlabored breathing, no " peripheral cyanosis or swelling.     Lungs:   no respiratory distress.  Abdomen: non- distended.  Extremities:  No LE swelling.  Skin:  Warm, dry.  No erythema. No rash.   Neurologic: Alert & oriented x 3. CN II-XII grossly intact. No focal deficits.  Psychiatric:  Affect normal, mood normal, judgment normal.    Labs     Labs are reviewed and discussed with a patient  Lab Results   Component Value Date/Time    CHOLSTRLTOT 171 09/02/2020 08:30 AM    LDL 92 09/02/2020 08:30 AM    HDL 45 09/02/2020 08:30 AM    TRIGLYCERIDE 172 (H) 09/02/2020 08:30 AM       Lab Results   Component Value Date/Time    SODIUM 137 09/02/2020 08:30 AM    SODIUM 139 09/02/2020 08:30 AM    POTASSIUM 3.9 09/02/2020 08:30 AM    POTASSIUM 3.8 09/02/2020 08:30 AM    CHLORIDE 101 09/02/2020 08:30 AM    CHLORIDE 103 09/02/2020 08:30 AM    CO2 23 09/02/2020 08:30 AM    CO2 23 09/02/2020 08:30 AM    GLUCOSE 91 09/02/2020 08:30 AM    GLUCOSE 92 09/02/2020 08:30 AM    BUN 8 09/02/2020 08:30 AM    BUN 8 09/02/2020 08:30 AM    CREATININE 0.47 (L) 09/02/2020 08:30 AM    CREATININE 0.50 09/02/2020 08:30 AM     Lab Results   Component Value Date/Time    ALKPHOSPHAT 109 (H) 09/02/2020 08:30 AM    ALKPHOSPHAT 107 (H) 09/02/2020 08:30 AM    ASTSGOT 9 (L) 09/02/2020 08:30 AM    ASTSGOT 9 (L) 09/02/2020 08:30 AM    ALTSGPT 10 09/02/2020 08:30 AM    ALTSGPT 7 09/02/2020 08:30 AM    TBILIRUBIN 0.4 09/02/2020 08:30 AM    TBILIRUBIN 0.4 09/02/2020 08:30 AM      Lab Results   Component Value Date/Time    HBA1C 5.5 09/02/2020 08:30 AM    HBA1C 5.4 10/02/2019 08:42 AM    HBA1C 5.7 (H) 03/12/2019 10:41 AM     Lab Results   Component Value Date/Time    WBC 5.5 09/02/2020 08:30 AM    RBC 5.42 (H) 09/02/2020 08:30 AM    HEMOGLOBIN 15.6 09/02/2020 08:30 AM    HEMATOCRIT 47.5 (H) 09/02/2020 08:30 AM    MCV 87.6 09/02/2020 08:30 AM    MCH 28.8 09/02/2020 08:30 AM    MCHC 32.8 (L) 09/02/2020 08:30 AM    MPV 9.8 09/02/2020 08:30 AM    NEUTSPOLYS 57.70 09/02/2020 08:30 AM     LYMPHOCYTES 29.50 09/02/2020 08:30 AM    MONOCYTES 8.30 09/02/2020 08:30 AM    EOSINOPHILS 2.90 09/02/2020 08:30 AM    BASOPHILS 1.40 09/02/2020 08:30 AM    HYPOCHROMIA 1+ 10/02/2019 08:42 AM    ANISOCYTOSIS 2+ 02/07/2020 09:32 AM      Imaging     None    Assessment and Plan     Mini Pham is a 70 y.o. female    1. Essential hypertension  Controlled, continue with current treatment.  - losartan (COZAAR) 50 MG Tab; TAKE 1.5 TABS DAILY  Dispense: 135 Tab; Refill: 0  - Comp Metabolic Panel; Future  - losartan (COZAAR) 50 MG Tab; TAKE 1 TABLET DAILY  Dispense: 90 Tab; Refill: 0    2. Dyslipidemia  Controlled, continue with current treatment.  - Comp Metabolic Panel; Future  - Lipid Profile; Future  - atorvastatin (LIPITOR) 10 MG Tab; Take 1 Tab by mouth every day.  Dispense: 90 Tab; Refill: 3    3. IFG (impaired fasting glucose)  Discussed about risk to develop DM.   Advised low carb diet, exercise, watch for WT.   - Comp Metabolic Panel; Future  - HEMOGLOBIN A1C; Future  - VITAMIN D,25 HYDROXY; Future    4. Atrophic gastritis without hemorrhage  Controlled B12 level, continue current treatment - labs follow-up  - VITAMIN B12; Future  - CBC WITH DIFFERENTIAL; Future  5. Pernicious anemia  - VITAMIN B12; Future  - CBC WITH DIFFERENTIAL; Future    6. Hypovitaminosis D  Improved, not at goal, continue current treatment  - VITAMIN D,25 HYDROXY; Future  - vitamin D, Ergocalciferol, (DRISDOL) 1.25 MG (66778 UT) Cap capsule; Take 1 Cap by mouth every 7 days.  Dispense: 12 Cap; Refill: 1    7. Elevated alkaline phosphatase level  Follow-up labs  - Comp Metabolic Panel; Future    8. Encounter for screening mammogram for malignant neoplasm of breast  - MA-SCREENING MAMMO BILAT W/TOMOSYNTHESIS W/CAD; Future    Counseling:   - Smoking:  Nonsmoker    Followup: In 4 months, follow-up labs    All questions are answered.    Please note that this dictation was created using voice recognition software, and that there might be errors  of angelina and possibly content.

## 2020-11-12 DIAGNOSIS — F32.5 MAJOR DEPRESSION IN REMISSION (HCC): ICD-10-CM

## 2020-11-12 DIAGNOSIS — Z86.59 HISTORY OF PANIC ATTACKS: ICD-10-CM

## 2020-11-12 DIAGNOSIS — F41.1 GAD (GENERALIZED ANXIETY DISORDER): ICD-10-CM

## 2020-11-13 RX ORDER — CITALOPRAM 20 MG/1
TABLET ORAL
Qty: 90 TAB | Refills: 3 | Status: SHIPPED | OUTPATIENT
Start: 2020-11-13 | End: 2022-01-03

## 2020-11-17 ENCOUNTER — APPOINTMENT (OUTPATIENT)
Dept: RADIOLOGY | Facility: MEDICAL CENTER | Age: 70
End: 2020-11-17
Attending: INTERNAL MEDICINE
Payer: MEDICARE

## 2020-11-23 ENCOUNTER — HOSPITAL ENCOUNTER (OUTPATIENT)
Dept: RADIOLOGY | Facility: MEDICAL CENTER | Age: 70
End: 2020-11-23
Attending: INTERNAL MEDICINE
Payer: MEDICARE

## 2020-11-23 DIAGNOSIS — R92.8 ABNORMAL MAMMOGRAM: ICD-10-CM

## 2020-11-23 PROCEDURE — G0279 TOMOSYNTHESIS, MAMMO: HCPCS | Mod: 50

## 2020-11-23 PROCEDURE — 76642 ULTRASOUND BREAST LIMITED: CPT | Mod: LT

## 2020-11-25 DIAGNOSIS — I10 ESSENTIAL HYPERTENSION: ICD-10-CM

## 2020-11-25 RX ORDER — LOSARTAN POTASSIUM 50 MG/1
TABLET ORAL
Qty: 135 TAB | Refills: 3 | Status: SHIPPED | OUTPATIENT
Start: 2020-11-25 | End: 2021-11-28

## 2021-02-26 ENCOUNTER — HOSPITAL ENCOUNTER (OUTPATIENT)
Dept: LAB | Facility: MEDICAL CENTER | Age: 71
End: 2021-02-26
Attending: INTERNAL MEDICINE
Payer: MEDICARE

## 2021-02-26 DIAGNOSIS — E55.9 HYPOVITAMINOSIS D: ICD-10-CM

## 2021-02-26 DIAGNOSIS — D51.0 PERNICIOUS ANEMIA: ICD-10-CM

## 2021-02-26 DIAGNOSIS — R73.01 IMPAIRED FASTING GLUCOSE: ICD-10-CM

## 2021-02-26 DIAGNOSIS — K29.40 ATROPHIC GASTRITIS WITHOUT HEMORRHAGE: ICD-10-CM

## 2021-02-26 DIAGNOSIS — R74.8 ELEVATED ALKALINE PHOSPHATASE LEVEL: ICD-10-CM

## 2021-02-26 DIAGNOSIS — E78.5 DYSLIPIDEMIA: ICD-10-CM

## 2021-02-26 DIAGNOSIS — I10 ESSENTIAL HYPERTENSION: ICD-10-CM

## 2021-02-26 LAB
25(OH)D3 SERPL-MCNC: 36 NG/ML (ref 30–100)
ALBUMIN SERPL BCP-MCNC: 4.2 G/DL (ref 3.2–4.9)
ALBUMIN SERPL BCP-MCNC: 4.2 G/DL (ref 3.2–4.9)
ALBUMIN/GLOB SERPL: 1.4 G/DL
ALBUMIN/GLOB SERPL: 1.5 G/DL
ALP SERPL-CCNC: 125 U/L (ref 30–99)
ALP SERPL-CCNC: 127 U/L (ref 30–99)
ALT SERPL-CCNC: 12 U/L (ref 2–50)
ALT SERPL-CCNC: 13 U/L (ref 2–50)
ANION GAP SERPL CALC-SCNC: 8 MMOL/L (ref 7–16)
ANION GAP SERPL CALC-SCNC: 9 MMOL/L (ref 7–16)
AST SERPL-CCNC: 14 U/L (ref 12–45)
AST SERPL-CCNC: 14 U/L (ref 12–45)
BASOPHILS # BLD AUTO: 1.2 % (ref 0–1.8)
BASOPHILS # BLD AUTO: 1.6 % (ref 0–1.8)
BASOPHILS # BLD: 0.06 K/UL (ref 0–0.12)
BASOPHILS # BLD: 0.08 K/UL (ref 0–0.12)
BILIRUB SERPL-MCNC: 0.4 MG/DL (ref 0.1–1.5)
BILIRUB SERPL-MCNC: 0.4 MG/DL (ref 0.1–1.5)
BUN SERPL-MCNC: 8 MG/DL (ref 8–22)
BUN SERPL-MCNC: 8 MG/DL (ref 8–22)
CALCIUM SERPL-MCNC: 9.2 MG/DL (ref 8.5–10.5)
CALCIUM SERPL-MCNC: 9.2 MG/DL (ref 8.5–10.5)
CHLORIDE SERPL-SCNC: 102 MMOL/L (ref 96–112)
CHLORIDE SERPL-SCNC: 104 MMOL/L (ref 96–112)
CHOLEST SERPL-MCNC: 180 MG/DL (ref 100–199)
CO2 SERPL-SCNC: 25 MMOL/L (ref 20–33)
CO2 SERPL-SCNC: 25 MMOL/L (ref 20–33)
CREAT SERPL-MCNC: 0.53 MG/DL (ref 0.5–1.4)
CREAT SERPL-MCNC: 0.54 MG/DL (ref 0.5–1.4)
EOSINOPHIL # BLD AUTO: 0.13 K/UL (ref 0–0.51)
EOSINOPHIL # BLD AUTO: 0.15 K/UL (ref 0–0.51)
EOSINOPHIL NFR BLD: 2.6 % (ref 0–6.9)
EOSINOPHIL NFR BLD: 2.9 % (ref 0–6.9)
ERYTHROCYTE [DISTWIDTH] IN BLOOD BY AUTOMATED COUNT: 40.5 FL (ref 35.9–50)
ERYTHROCYTE [DISTWIDTH] IN BLOOD BY AUTOMATED COUNT: 40.5 FL (ref 35.9–50)
EST. AVERAGE GLUCOSE BLD GHB EST-MCNC: 103 MG/DL
FASTING STATUS PATIENT QL REPORTED: NORMAL
FASTING STATUS PATIENT QL REPORTED: NORMAL
FERRITIN SERPL-MCNC: 47.2 NG/ML (ref 10–291)
FOLATE SERPL-MCNC: 14.6 NG/ML
GLOBULIN SER CALC-MCNC: 2.8 G/DL (ref 1.9–3.5)
GLOBULIN SER CALC-MCNC: 2.9 G/DL (ref 1.9–3.5)
GLUCOSE SERPL-MCNC: 91 MG/DL (ref 65–99)
GLUCOSE SERPL-MCNC: 93 MG/DL (ref 65–99)
HBA1C MFR BLD: 5.2 % (ref 4–5.6)
HCT VFR BLD AUTO: 47.6 % (ref 37–47)
HCT VFR BLD AUTO: 47.8 % (ref 37–47)
HDLC SERPL-MCNC: 60 MG/DL
HGB BLD-MCNC: 15.4 G/DL (ref 12–16)
HGB BLD-MCNC: 15.5 G/DL (ref 12–16)
IMM GRANULOCYTES # BLD AUTO: 0.01 K/UL (ref 0–0.11)
IMM GRANULOCYTES # BLD AUTO: 0.01 K/UL (ref 0–0.11)
IMM GRANULOCYTES NFR BLD AUTO: 0.2 % (ref 0–0.9)
IMM GRANULOCYTES NFR BLD AUTO: 0.2 % (ref 0–0.9)
LDLC SERPL CALC-MCNC: 94 MG/DL
LYMPHOCYTES # BLD AUTO: 1.67 K/UL (ref 1–4.8)
LYMPHOCYTES # BLD AUTO: 1.7 K/UL (ref 1–4.8)
LYMPHOCYTES NFR BLD: 32.7 % (ref 22–41)
LYMPHOCYTES NFR BLD: 33.6 % (ref 22–41)
MCH RBC QN AUTO: 28.1 PG (ref 27–33)
MCH RBC QN AUTO: 28.2 PG (ref 27–33)
MCHC RBC AUTO-ENTMCNC: 32.4 G/DL (ref 33.6–35)
MCHC RBC AUTO-ENTMCNC: 32.4 G/DL (ref 33.6–35)
MCV RBC AUTO: 86.7 FL (ref 81.4–97.8)
MCV RBC AUTO: 86.9 FL (ref 81.4–97.8)
MONOCYTES # BLD AUTO: 0.34 K/UL (ref 0–0.85)
MONOCYTES # BLD AUTO: 0.37 K/UL (ref 0–0.85)
MONOCYTES NFR BLD AUTO: 6.7 % (ref 0–13.4)
MONOCYTES NFR BLD AUTO: 7.3 % (ref 0–13.4)
NEUTROPHILS # BLD AUTO: 2.8 K/UL (ref 2–7.15)
NEUTROPHILS # BLD AUTO: 2.84 K/UL (ref 2–7.15)
NEUTROPHILS NFR BLD: 55.3 % (ref 44–72)
NEUTROPHILS NFR BLD: 55.7 % (ref 44–72)
NRBC # BLD AUTO: 0 K/UL
NRBC # BLD AUTO: 0 K/UL
NRBC BLD-RTO: 0 /100 WBC
NRBC BLD-RTO: 0 /100 WBC
PLATELET # BLD AUTO: 353 K/UL (ref 164–446)
PLATELET # BLD AUTO: 360 K/UL (ref 164–446)
PMV BLD AUTO: 10.2 FL (ref 9–12.9)
PMV BLD AUTO: 10.2 FL (ref 9–12.9)
POTASSIUM SERPL-SCNC: 4.1 MMOL/L (ref 3.6–5.5)
POTASSIUM SERPL-SCNC: 4.1 MMOL/L (ref 3.6–5.5)
PROT SERPL-MCNC: 7 G/DL (ref 6–8.2)
PROT SERPL-MCNC: 7.1 G/DL (ref 6–8.2)
RBC # BLD AUTO: 5.49 M/UL (ref 4.2–5.4)
RBC # BLD AUTO: 5.5 M/UL (ref 4.2–5.4)
SODIUM SERPL-SCNC: 135 MMOL/L (ref 135–145)
SODIUM SERPL-SCNC: 138 MMOL/L (ref 135–145)
TRIGL SERPL-MCNC: 132 MG/DL (ref 0–149)
VIT B12 SERPL-MCNC: 968 PG/ML (ref 211–911)
VIT B12 SERPL-MCNC: 977 PG/ML (ref 211–911)
WBC # BLD AUTO: 5.1 K/UL (ref 4.8–10.8)
WBC # BLD AUTO: 5.1 K/UL (ref 4.8–10.8)

## 2021-02-26 PROCEDURE — 36415 COLL VENOUS BLD VENIPUNCTURE: CPT

## 2021-02-26 PROCEDURE — 82607 VITAMIN B-12: CPT | Mod: 91

## 2021-02-26 PROCEDURE — 85025 COMPLETE CBC W/AUTO DIFF WBC: CPT | Mod: 91

## 2021-02-26 PROCEDURE — 80061 LIPID PANEL: CPT

## 2021-02-26 PROCEDURE — 82728 ASSAY OF FERRITIN: CPT

## 2021-02-26 PROCEDURE — 85025 COMPLETE CBC W/AUTO DIFF WBC: CPT

## 2021-02-26 PROCEDURE — 82746 ASSAY OF FOLIC ACID SERUM: CPT

## 2021-02-26 PROCEDURE — 82607 VITAMIN B-12: CPT

## 2021-02-26 PROCEDURE — 82306 VITAMIN D 25 HYDROXY: CPT

## 2021-02-26 PROCEDURE — 80053 COMPREHEN METABOLIC PANEL: CPT | Mod: 91

## 2021-02-26 PROCEDURE — 80053 COMPREHEN METABOLIC PANEL: CPT

## 2021-02-26 PROCEDURE — 83036 HEMOGLOBIN GLYCOSYLATED A1C: CPT | Mod: GA

## 2021-03-03 DIAGNOSIS — Z23 NEED FOR VACCINATION: ICD-10-CM

## 2021-03-08 SDOH — ECONOMIC STABILITY: INCOME INSECURITY: IN THE LAST 12 MONTHS, WAS THERE A TIME WHEN YOU WERE NOT ABLE TO PAY THE MORTGAGE OR RENT ON TIME?: NO

## 2021-03-08 SDOH — HEALTH STABILITY: PHYSICAL HEALTH: ON AVERAGE, HOW MANY DAYS PER WEEK DO YOU ENGAGE IN MODERATE TO STRENUOUS EXERCISE (LIKE A BRISK WALK)?: 0 DAYS

## 2021-03-08 SDOH — ECONOMIC STABILITY: HOUSING INSECURITY
IN THE LAST 12 MONTHS, WAS THERE A TIME WHEN YOU DID NOT HAVE A STEADY PLACE TO SLEEP OR SLEPT IN A SHELTER (INCLUDING NOW)?: NO

## 2021-03-08 SDOH — ECONOMIC STABILITY: HOUSING INSECURITY: IN THE LAST 12 MONTHS, HOW MANY PLACES HAVE YOU LIVED?: 1

## 2021-03-08 SDOH — ECONOMIC STABILITY: TRANSPORTATION INSECURITY
IN THE PAST 12 MONTHS, HAS THE LACK OF TRANSPORTATION KEPT YOU FROM MEDICAL APPOINTMENTS OR FROM GETTING MEDICATIONS?: NO

## 2021-03-08 SDOH — HEALTH STABILITY: PHYSICAL HEALTH: ON AVERAGE, HOW MANY MINUTES DO YOU ENGAGE IN EXERCISE AT THIS LEVEL?: 0 MINUTES

## 2021-03-08 SDOH — ECONOMIC STABILITY: TRANSPORTATION INSECURITY
IN THE PAST 12 MONTHS, HAS LACK OF RELIABLE TRANSPORTATION KEPT YOU FROM MEDICAL APPOINTMENTS, MEETINGS, WORK OR FROM GETTING THINGS NEEDED FOR DAILY LIVING?: NO

## 2021-03-08 SDOH — HEALTH STABILITY: MENTAL HEALTH
STRESS IS WHEN SOMEONE FEELS TENSE, NERVOUS, ANXIOUS, OR CAN'T SLEEP AT NIGHT BECAUSE THEIR MIND IS TROUBLED. HOW STRESSED ARE YOU?: NOT AT ALL

## 2021-03-08 ASSESSMENT — SOCIAL DETERMINANTS OF HEALTH (SDOH)
HOW OFTEN DO YOU HAVE A DRINK CONTAINING ALCOHOL: 4 OR MORE TIMES A WEEK
WITHIN THE PAST 12 MONTHS, THE FOOD YOU BOUGHT JUST DIDN'T LAST AND YOU DIDN'T HAVE MONEY TO GET MORE: NEVER TRUE
ARE YOU MARRIED, WIDOWED, DIVORCED, SEPARATED, NEVER MARRIED, OR LIVING WITH A PARTNER?: NEVER MARRIED
IN A TYPICAL WEEK, HOW MANY TIMES DO YOU TALK ON THE PHONE WITH FAMILY, FRIENDS, OR NEIGHBORS?: MORE THAN THREE TIMES A WEEK
DO YOU BELONG TO ANY CLUBS OR ORGANIZATIONS SUCH AS CHURCH GROUPS UNIONS, FRATERNAL OR ATHLETIC GROUPS, OR SCHOOL GROUPS?: YES
WITHIN THE PAST 12 MONTHS, YOU WORRIED THAT YOUR FOOD WOULD RUN OUT BEFORE YOU GOT THE MONEY TO BUY MORE: NEVER TRUE
HOW OFTEN DO YOU GET TOGETHER WITH FRIENDS OR RELATIVES?: ONCE A WEEK
HOW OFTEN DO YOU ATTEND CHURCH OR RELIGIOUS SERVICES?: NEVER
HOW OFTEN DO YOU HAVE SIX OR MORE DRINKS ON ONE OCCASION: NEVER
HOW MANY DRINKS CONTAINING ALCOHOL DO YOU HAVE ON A TYPICAL DAY WHEN YOU ARE DRINKING: 1 OR 2
HOW OFTEN DO YOU ATTENT MEETINGS OF THE CLUB OR ORGANIZATION YOU BELONG TO?: MORE THAN 4 TIMES PER YEAR
HOW HARD IS IT FOR YOU TO PAY FOR THE VERY BASICS LIKE FOOD, HOUSING, MEDICAL CARE, AND HEATING?: NOT HARD AT ALL

## 2021-03-08 NOTE — PROGRESS NOTES
ESTABLISHED PATIENT PRE-VISIT PLANNING     03/08/21 Called pt. No answer. Left voice message. Gave virtual option. Advised office visit scheduled Wednesday, 03/10/21@ 2:30PM Dr.Kos Neftali Dawson.    Patient was contacted to complete PVP.     Note: Patient will not be contacted if there is no indication to call.     1.  Reviewed notes from the last few office visits within the medical group: Yes    2.  If any orders were placed at last visit or intended to be done for this visit (i.e. 6 mos follow-up), do we have Results/Consult Notes?         •  Labs - Labs ordered, completed on 02/26/21 and results are in chart.  Note: If patient appointment is for lab review and patient did not complete labs, check with provider if OK to reschedule patient until labs completed.       •  Imaging - Imaging ordered, NOT completed. Patient advised to complete prior to next appointment.       •  Referrals - No referrals were ordered at last office visit.    3. Is this appointment scheduled as a Hospital Follow-Up? No    4.  Immunizations were updated in Epic using Reconcile Outside Information activity? Yes    5.  Patient is due for the following Health Maintenance Topics:   Health Maintenance Due   Topic Date Due   • COVID-19 Vaccine (1 of 2) Never done   • Annual Wellness Visit  02/13/2021     6.  AHA (Pulse[employer]) form printed for Provider? N/A

## 2021-03-10 ENCOUNTER — TELEMEDICINE (OUTPATIENT)
Dept: MEDICAL GROUP | Age: 71
End: 2021-03-10
Payer: MEDICARE

## 2021-03-10 VITALS
WEIGHT: 243 LBS | SYSTOLIC BLOOD PRESSURE: 123 MMHG | BODY MASS INDEX: 40.48 KG/M2 | OXYGEN SATURATION: 96 % | DIASTOLIC BLOOD PRESSURE: 74 MMHG | HEIGHT: 65 IN

## 2021-03-10 DIAGNOSIS — D75.839 THROMBOCYTOSIS: ICD-10-CM

## 2021-03-10 DIAGNOSIS — Z00.00 HEALTH CARE MAINTENANCE: ICD-10-CM

## 2021-03-10 DIAGNOSIS — R73.01 IMPAIRED FASTING GLUCOSE: ICD-10-CM

## 2021-03-10 DIAGNOSIS — E78.5 DYSLIPIDEMIA: ICD-10-CM

## 2021-03-10 DIAGNOSIS — E55.9 HYPOVITAMINOSIS D: ICD-10-CM

## 2021-03-10 DIAGNOSIS — M17.0 PRIMARY OSTEOARTHRITIS OF BOTH KNEES: ICD-10-CM

## 2021-03-10 DIAGNOSIS — D50.9 MICROCYTIC ANEMIA: ICD-10-CM

## 2021-03-10 DIAGNOSIS — I10 ESSENTIAL HYPERTENSION: ICD-10-CM

## 2021-03-10 DIAGNOSIS — F32.5 MAJOR DEPRESSION IN REMISSION (HCC): ICD-10-CM

## 2021-03-10 DIAGNOSIS — R74.8 ELEVATED ALKALINE PHOSPHATASE LEVEL: ICD-10-CM

## 2021-03-10 DIAGNOSIS — K29.40 ATROPHIC GASTRITIS WITHOUT HEMORRHAGE: ICD-10-CM

## 2021-03-10 DIAGNOSIS — Z00.00 MEDICARE ANNUAL WELLNESS VISIT, SUBSEQUENT: ICD-10-CM

## 2021-03-10 PROCEDURE — G0439 PPPS, SUBSEQ VISIT: HCPCS | Mod: 95,CR | Performed by: INTERNAL MEDICINE

## 2021-03-10 ASSESSMENT — ENCOUNTER SYMPTOMS: GENERAL WELL-BEING: GOOD

## 2021-03-10 ASSESSMENT — ACTIVITIES OF DAILY LIVING (ADL): BATHING_REQUIRES_ASSISTANCE: 0

## 2021-03-10 ASSESSMENT — PATIENT HEALTH QUESTIONNAIRE - PHQ9: CLINICAL INTERPRETATION OF PHQ2 SCORE: 0

## 2021-03-10 ASSESSMENT — FIBROSIS 4 INDEX: FIB4 SCORE: 0.77

## 2021-03-10 NOTE — PROGRESS NOTES
Telemedicine Visit: Established Patient     This Remote Face to Face encounter was conducted via [employer]. Given the importance of social distancing and other strategies recommended to reduce the risk of COVID-19 transmission, I am providing medical care to this patient via audio/video visit in place of an in person visit at the request of the patient. Verbal consent to telehealth, risks, benefits, and consequences were discussed. Patient retains the right to withdraw at any time. All existing confidentiality protections apply. The patient has access to all transmitted medical information. No dissemination of any patient images or information to other entities without further written consent.    Chief Complaint   Patient presents with   • Annual Exam   • Lab Results   • Nail Problem     Left index finger nail, right corner rips off, depressed, ridge, gets caught on things     HPI:  Mini is a 71 y.o. here for Medicare Annual Wellness Visit    Patient Active Problem List    Diagnosis Date Noted   • Elevated alkaline phosphatase level 02/13/2020   • Hypovitaminosis D 02/13/2020   • BMI 40.0-44.9, adult ([facility]) 10/08/2019   • Major depression in remission ([facility]) 10/08/2019   • LUCY (generalized anxiety disorder) 10/08/2019   • Atrophic gastritis without hemorrhage 10/08/2019   • Thrombocytosis ([facility]) 10/08/2019   • Microcytic anemia 10/08/2019   • Essential hypertension 02/25/2019   • Dyslipidemia 02/25/2019   • IFG (impaired fasting glucose) 02/25/2019   • Primary osteoarthritis of both knees 02/25/2019   • Pernicious anemia 02/25/2019   • Health care maintenance 02/25/2019     Current Outpatient Medications   Medication Sig Dispense Refill   • VITAMIN D PO Take 2,000 Units by mouth.     • Cyanocobalamin (VITAMIN B 12 PO) Take 2,500 mcg by mouth. Under tongue     • losartan (COZAAR) 50 MG Tab TAKE 1 AND 1/2 TABLETS     DAILY 135 Tab 3   • citalopram (CELEXA) 20 MG Tab TAKE 1 TABLET DAILY 90 Tab 3   • atorvastatin  (LIPITOR) 10 MG Tab Take 1 Tab by mouth every day. 90 Tab 3   • vitamin D, Ergocalciferol, (DRISDOL) 1.25 MG (25004 UT) Cap capsule Take 1 Cap by mouth every 7 days. 12 Cap 1     No current facility-administered medications for this visit.      Patient is taking medications as noted in medication list.  Current supplements as per medication list.     Allergies: Patient has no known allergies.    Current social contact/activities: Limited with covid, use to swim and go to classes. Not walking with bad knee.      Is patient current with immunizations? Yes.    She  reports that she quit smoking about 29 years ago. Her smoking use included cigarettes. She has a 30.00 pack-year smoking history. She has never used smokeless tobacco. She reports current alcohol use of about 0.6 oz of alcohol per week. She reports previous drug use. Drugs: Cocaine and Marijuana.  Counseling given: Not Answered    DPA/Advanced directive: Patient does not have an Advanced Directive.  A packet and workshop information was given on Advanced Directives.    ROS:    Gait: Uses no assistive device   Ostomy: No   Other tubes: No   Amputations: No   Chronic oxygen use No   Last eye exam 2018   Wears hearing aids: No   : Denies any urinary leakage during the last 6 months    Screening:    Depression Screening  Little interest or pleasure in doing things?  0 - not at all  Feeling down, depressed, or hopeless? 0 - not at all  Patient Health Questionnaire Score: 0    Screening for Cognitive Impairment  Three Minute Recall (river, nation, finger)  3/3 River, nation, finger  Boris clock face with all 12 numbers and set the hands to show 10 past 11.  Yes 5/5  If cognitive concerns identified, deferred for follow up unless specifically addressed in assessment and plan.    Fall Risk Assessment  Has the patient had two or more falls in the last year or any fall with injury in the last year?  No  If fall risk identified, deferred for follow up unless  specifically addressed in assessment and plan.    Safety Assessment  Throw rugs on floor.  No  Handrails on all stairs.  No  Good lighting in all hallways.  Yes  Difficulty hearing.  No  Patient counseled about all safety risks that were identified.    Functional Assessment ADLs  Are there any barriers preventing you from cooking for yourself or meeting nutritional needs?  No.    Are there any barriers preventing you from driving safely or obtaining transportation?  No.    Are there any barriers preventing you from using a telephone or calling for help?  No.    Are there any barriers preventing you from shopping?  No.    Are there any barriers preventing you from taking care of your own finances?  No.    Are there any barriers preventing you from managing your medications?  No.    Are there any barriers preventing you from showering, bathing or dressing yourself?  No.    Are you currently engaging in any exercise or physical activity?  No.  Limited with covid, use to swim and go to classes. Not walking with bad knee.   What is your perception of your health?  Good.    Health Maintenance Summary                Annual Wellness Visit Overdue 2/13/2021      Done 2/13/2020      Patient has more history with this topic...    COVID-19 Vaccine Next Due 3/27/2021      Done 2/27/2021 Imm Admin: Moderna SARS-CoV-2 Vaccine    MAMMOGRAM Next Due 11/23/2021      Done 11/23/2020 MA-DIAGNOSTIC MAMMO BILAT W/TOMOSYNTHESIS W/CAD     Patient has more history with this topic...    IMM DTaP/Tdap/Td Vaccine Next Due 1/1/2022      Done 1/1/2012 Imm Admin: Tdap Vaccine    BONE DENSITY Next Due 9/12/2024      Done 9/12/2019 DS-BONE DENSITY STUDY (DEXA)    COLONOSCOPY Next Due 9/13/2028      Done 9/13/2018 REFERRAL TO GI FOR COLONOSCOPY        Patient Care Team:  Adama Rogers M.D. as PCP - General (Family Medicine)    Social History     Tobacco Use   • Smoking status: Former Smoker     Packs/day: 1.00     Years: 30.00     Pack  "years: 30.00     Types: Cigarettes     Quit date:      Years since quittin.2   • Smokeless tobacco: Never Used   Substance Use Topics   • Alcohol use: Yes     Alcohol/week: 0.6 oz     Types: 1 Glasses of wine per week     Comment: 3 times/week   • Drug use: Not Currently     Types: Cocaine, Marijuana     Comment: Remote, 50 years ago     Family History   Problem Relation Age of Onset   • Alzheimer's Disease Mother 50        early onset   • Cancer Father 50        laryngeal, mets to bladder   • Heart Attack Sister 56   • Diabetes Sister    • Hyperlipidemia Sister    • Depression Sister    • Cancer Maternal Aunt 50        colon   • Colon Cancer Sister 69   • Depression Sister    • No Known Problems Maternal Grandmother    • Diabetes Maternal Grandfather         late onset   • No Known Problems Paternal Grandmother    • No Known Problems Paternal Grandfather    • Hypertension Neg Hx      She  has a past medical history of Anemia, pernicious, Anxiety, Atrophic gastritis, Hyperlipidemia, Hypertension, Osteoarthritis, Prediabetes, and Tendonitis.   Past Surgical History:   Procedure Laterality Date   • CHOLECYSTECTOMY       Exam:   /74 (BP Location: Left arm, Patient Position: Sitting)   Ht 1.651 m (5' 5\")   Wt 110 kg (243 lb)   SpO2 96%  Body mass index is 40.44 kg/m².  Hearing good.    Dentition fair  Alert, oriented in no acute distress.  Eye contact is good, speech goal directed, affect calm    Labs  Reviewed and discussed  Lab Results   Component Value Date/Time    CHOLSTRLTOT 180 2021 09:27 AM    LDL 94 2021 09:27 AM    HDL 60 2021 09:27 AM    TRIGLYCERIDE 132 2021 09:27 AM       Lab Results   Component Value Date/Time    SODIUM 138 2021 09:28 AM    POTASSIUM 4.1 2021 09:28 AM    CHLORIDE 104 2021 09:28 AM    CO2 25 2021 09:28 AM    GLUCOSE 91 2021 09:28 AM    BUN 8 2021 09:28 AM    CREATININE 0.54 2021 09:28 AM     Lab Results "   Component Value Date/Time    ALKPHOSPHAT 125 (H) 02/26/2021 09:28 AM    ASTSGOT 14 02/26/2021 09:28 AM    ALTSGPT 12 02/26/2021 09:28 AM    TBILIRUBIN 0.4 02/26/2021 09:28 AM      Lab Results   Component Value Date/Time    HBA1C 5.2 02/26/2021 09:27 AM    HBA1C 5.5 09/02/2020 08:30 AM    HBA1C 5.4 10/02/2019 08:42 AM     No results found for: TSH  No results found for: FREET4  Lab Results   Component Value Date/Time    WBC 5.1 02/26/2021 09:28 AM    RBC 5.50 (H) 02/26/2021 09:28 AM    HEMOGLOBIN 15.5 02/26/2021 09:28 AM    HEMATOCRIT 47.8 (H) 02/26/2021 09:28 AM    MCV 86.9 02/26/2021 09:28 AM    MCH 28.2 02/26/2021 09:28 AM    MCHC 32.4 (L) 02/26/2021 09:28 AM    MPV 10.2 02/26/2021 09:28 AM    NEUTSPOLYS 55.70 02/26/2021 09:28 AM    LYMPHOCYTES 32.70 02/26/2021 09:28 AM    MONOCYTES 7.30 02/26/2021 09:28 AM    EOSINOPHILS 2.90 02/26/2021 09:28 AM    BASOPHILS 1.20 02/26/2021 09:28 AM    HYPOCHROMIA 1+ 10/02/2019 08:42 AM    ANISOCYTOSIS 2+ 02/07/2020 09:32 AM      Assessment and Plan    1. Medicare annual wellness visit, subsequent  Reviewed PMH, PSH, FH, SH, ALL, MEDS, HCM/IMM.   - Subsequent Annual Wellness Visit - Includes PPPS ()    2. Health care maintenance  Per HPI  - Subsequent Annual Wellness Visit - Includes PPPS ()    3. Essential hypertension  Controlled, continue with current treatment, losartan, 50 mg daily  - Subsequent Annual Wellness Visit - Includes PPPS ()  - Comp Metabolic Panel; Standing    4. Dyslipidemia.  Controlled, continue with current treatment, atorvastatin, 10 mg daily  - Subsequent Annual Wellness Visit - Includes PPPS ()  - Comp Metabolic Panel; Standing  - Lipid Profile; Standing    5. IFG (impaired fasting glucose)  Discussed about risk to develop DM.   Advised low carb diet, exercise, watch for WT.   - Subsequent Annual Wellness Visit - Includes PPPS ()  - Comp Metabolic Panel; Standing    6. Hypovitaminosis D  On 2000 units of vitamin D daily, OTC;  advised to  - Subsequent Annual Wellness Visit - Includes PPPS ()  - VITAMIN D,25 HYDROXY; Standing  7. Elevated alkaline phosphatase level  Stable, follow-up labs  - Subsequent Annual Wellness Visit - Includes PPPS ()    8. Major depression in remission ([facility])  Controlled, continue with current treatment, Celexa, 20 mg daily  - Subsequent Annual Wellness Visit - Includes PPPS ()    9. Atrophic gastritis without hemorrhage  B12 and folate are normal  - Subsequent Annual Wellness Visit - Includes PPPS ()    10. Microcytic anemia  She has been evaluated by hematology  - Subsequent Annual Wellness Visit - Includes PPPS ()  - CBC WITH DIFFERENTIAL; Standing  11. Thrombocytosis (H[facility])  Evaluated by hematology  - Subsequent Annual Wellness Visit - Includes PPPS ()  - CBC WITH DIFFERENTIAL; Standing    12. Primary osteoarthritis of both knees  Stable, advised to continue activity as tolerated, Tylenol as needed  - Subsequent Annual Wellness Visit - Includes PPPS ()    13. BMI 40.0-44.9, adult ([facility])  - Patient identified as having weight management issue.  Appropriate orders and counseling given.  - Subsequent Annual Wellness Visit - Includes PPPS ()    Services suggested: No services needed at this time  Health Care Screening recommendations as per orders if indicated.  Referrals offered: PT/OT/Nutrition counseling/Behavioral Health/Smoking cessation as per orders if indicated.    Discussion today about general wellness and lifestyle habits:    · Prevent falls and reduce trip hazards; Cautioned about securing or removing rugs.  · Have a working fire alarm and carbon monoxide detector;   · Engage in regular physical activity and social activities     Follow-up: In 6 months and as needed

## 2021-03-13 ENCOUNTER — OFFICE VISIT (OUTPATIENT)
Dept: URGENT CARE | Facility: CLINIC | Age: 71
End: 2021-03-13
Payer: MEDICARE

## 2021-03-13 VITALS
HEIGHT: 65 IN | DIASTOLIC BLOOD PRESSURE: 86 MMHG | BODY MASS INDEX: 40.48 KG/M2 | WEIGHT: 243 LBS | TEMPERATURE: 97.1 F | OXYGEN SATURATION: 93 % | SYSTOLIC BLOOD PRESSURE: 164 MMHG | HEART RATE: 71 BPM

## 2021-03-13 DIAGNOSIS — M62.838 MUSCLE SPASMS OF NECK: ICD-10-CM

## 2021-03-13 PROCEDURE — 99213 OFFICE O/P EST LOW 20 MIN: CPT | Performed by: PHYSICIAN ASSISTANT

## 2021-03-13 RX ORDER — CYCLOBENZAPRINE HCL 5 MG
5-10 TABLET ORAL 3 TIMES DAILY PRN
Qty: 30 TABLET | Refills: 0 | Status: SHIPPED | OUTPATIENT
Start: 2021-03-13 | End: 2022-02-01

## 2021-03-13 RX ORDER — METHYLPREDNISOLONE 4 MG/1
TABLET ORAL
Qty: 21 TABLET | Refills: 0 | Status: SHIPPED | OUTPATIENT
Start: 2021-03-13 | End: 2022-02-01

## 2021-03-13 ASSESSMENT — ENCOUNTER SYMPTOMS
FEVER: 0
NUMBNESS: 0
NAUSEA: 0
SHORTNESS OF BREATH: 0
TINGLING: 0
VOMITING: 0
PALPITATIONS: 0
SYNCOPE: 0
FOCAL WEAKNESS: 0
CHILLS: 0
NECK PAIN: 1
HEADACHES: 0
WEAKNESS: 0
MYALGIAS: 1
SENSORY CHANGE: 0
COUGH: 0

## 2021-03-13 ASSESSMENT — FIBROSIS 4 INDEX: FIB4 SCORE: 0.77

## 2021-03-13 NOTE — PROGRESS NOTES
Subjective:      Mini Pham is a 71 y.o. female who presents with Neck Pain (spasm started this morning )            Neck Pain   This is a new problem. The current episode started today. The problem occurs constantly. The problem has been unchanged. Associated with: exercises the past 3 days  The pain is present in the left side. The quality of the pain is described as aching and cramping. The pain is moderate. The symptoms are aggravated by twisting. The pain is same all the time. Pertinent negatives include no chest pain, fever, headaches, numbness, pain with swallowing, syncope, tingling or weakness. She has tried nothing for the symptoms.       Patient reports having similar episode 1.5 years ago. She received Medrol and a muscle relaxant which helped significantly.    Past Medical History:   Diagnosis Date   • Anemia, pernicious    • Anxiety    • Atrophic gastritis    • Hyperlipidemia    • Hypertension    • Osteoarthritis    • Prediabetes     fasting glucose 102   • Tendonitis     chronic, elbows and wrists       Past Surgical History:   Procedure Laterality Date   • CHOLECYSTECTOMY  2014       Family History   Problem Relation Age of Onset   • Alzheimer's Disease Mother 50        early onset   • Cancer Father 50        laryngeal, mets to bladder   • Heart Attack Sister 56   • Diabetes Sister    • Hyperlipidemia Sister    • Depression Sister    • Cancer Maternal Aunt 50        colon   • Colon Cancer Sister 69   • Depression Sister    • No Known Problems Maternal Grandmother    • Diabetes Maternal Grandfather         late onset   • No Known Problems Paternal Grandmother    • No Known Problems Paternal Grandfather    • Hypertension Neg Hx        No Known Allergies    Medications, Allergies, and current problem list reviewed today in Epic    Review of Systems   Constitutional: Negative for chills, fever and malaise/fatigue.   Respiratory: Negative for cough and shortness of breath.    Cardiovascular: Negative  "for chest pain, palpitations, leg swelling and syncope.   Gastrointestinal: Negative for nausea and vomiting.   Musculoskeletal: Positive for myalgias and neck pain.   Neurological: Negative for tingling, sensory change, focal weakness, weakness, numbness and headaches.     All other systems reviewed and are negative.        Objective:     BP (!) 164/86 (BP Location: Left arm, Patient Position: Sitting, BP Cuff Size: Adult)   Pulse 71   Temp 36.2 °C (97.1 °F) (Temporal)   Ht 1.651 m (5' 5\")   Wt 110 kg (243 lb)   SpO2 93%   BMI 40.44 kg/m²      Physical Exam  Constitutional:       General: She is not in acute distress.  HENT:      Head: Normocephalic and atraumatic.   Eyes:      Conjunctiva/sclera: Conjunctivae normal.   Cardiovascular:      Rate and Rhythm: Normal rate and regular rhythm.      Heart sounds: Normal heart sounds. No murmur.   Pulmonary:      Effort: Pulmonary effort is normal. No respiratory distress.      Breath sounds: Normal breath sounds. No wheezing.   Musculoskeletal:      Cervical back: No edema or erythema. Pain with movement and muscular tenderness (left cervical paraspinal muscle and left trapezius ) present. No spinous process tenderness. Decreased range of motion.   Skin:     General: Skin is warm and dry.   Neurological:      General: No focal deficit present.      Mental Status: She is alert and oriented to person, place, and time.   Psychiatric:         Mood and Affect: Mood normal.         Behavior: Behavior normal.         Thought Content: Thought content normal.         Judgment: Judgment normal.                 Assessment/Plan:        1. Muscle spasms of neck    - methylPREDNISolone (MEDROL DOSEPAK) 4 MG Tablet Therapy Pack; Follow schedule on package instructions.  Dispense: 21 tablet; Refill: 0    - cyclobenzaprine (FLEXERIL) 5 mg tablet; Take 1-2 Tablets by mouth 3 times a day as needed.  Dispense: 30 tablet; Refill: 0  Sedation side effects discussed. No Driving or " alcohol with medication given.      Encouraged alternating heat and ice. Recommend massage.   Patient's BP was elevated today. Patient reports she has a hx of Whitecoat syndrome. She will monitor BP at home and be rechecked if her BP stays elevated.    Differential diagnoses, Supportive care, and indications for immediate follow-up discussed with patient.   Pathogenesis of diagnosis discussed including typical length and natural progression.   Instructed to return to clinic or nearest emergency department for any change in condition, further concerns, or worsening of symptoms.    The patient demonstrated a good understanding and agreed with the treatment plan.    Jessy Nguyen P.A.-C.

## 2021-04-21 ENCOUNTER — APPOINTMENT (RX ONLY)
Dept: URBAN - METROPOLITAN AREA CLINIC 4 | Facility: CLINIC | Age: 71
Setting detail: DERMATOLOGY
End: 2021-04-21

## 2021-04-21 DIAGNOSIS — D485 NEOPLASM OF UNCERTAIN BEHAVIOR OF SKIN: ICD-10-CM

## 2021-04-21 PROBLEM — D48.5 NEOPLASM OF UNCERTAIN BEHAVIOR OF SKIN: Status: ACTIVE | Noted: 2021-04-21

## 2021-04-21 PROCEDURE — ? TREATMENT REGIMEN

## 2021-04-21 PROCEDURE — 99202 OFFICE O/P NEW SF 15 MIN: CPT

## 2021-04-21 PROCEDURE — ? ORDER TESTS

## 2021-04-21 NOTE — PROCEDURE: TREATMENT REGIMEN
Plan: Discussed need for biopsy of nail fold/matrix; patient wishes to obtain imaging of hand first, if unrevealing (unlikely exostoses/tumor), will proceed with biopsy in 2-3 weeks
Detail Level: Zone

## 2021-04-21 NOTE — PROCEDURE: ORDER TESTS
Expected Date Of Service: 04/21/2021
Performing Laboratory: [id number]
Clinical Notes (To The Lab): Left hand distal left index finger tip
Billing Type: Third-Party Bill
Bill For Surgical Tray: no

## 2021-04-23 ENCOUNTER — HOSPITAL ENCOUNTER (OUTPATIENT)
Dept: RADIOLOGY | Facility: MEDICAL CENTER | Age: 71
End: 2021-04-23
Attending: DERMATOLOGY
Payer: MEDICARE

## 2021-04-23 DIAGNOSIS — D48.5 NEOPLASM OF UNCERTAIN BEHAVIOR OF SKIN: ICD-10-CM

## 2021-04-23 PROCEDURE — 73130 X-RAY EXAM OF HAND: CPT | Mod: LT

## 2021-04-28 ENCOUNTER — RX ONLY (OUTPATIENT)
Age: 71
Setting detail: RX ONLY
End: 2021-04-28

## 2021-04-28 RX ORDER — LIDOCAINE AND PRILOCAINE 25; 25 MG/G; MG/G
CREAM TOPICAL
Qty: 1 | Refills: 0 | Status: ERX | COMMUNITY
Start: 2021-04-28

## 2021-04-30 ENCOUNTER — APPOINTMENT (RX ONLY)
Dept: URBAN - METROPOLITAN AREA CLINIC 4 | Facility: CLINIC | Age: 71
Setting detail: DERMATOLOGY
End: 2021-04-30

## 2021-04-30 DIAGNOSIS — D485 NEOPLASM OF UNCERTAIN BEHAVIOR OF SKIN: ICD-10-CM

## 2021-04-30 PROBLEM — D48.5 NEOPLASM OF UNCERTAIN BEHAVIOR OF SKIN: Status: ACTIVE | Noted: 2021-04-30

## 2021-04-30 PROCEDURE — 11755 BIOPSY NAIL UNIT: CPT

## 2021-04-30 PROCEDURE — ? BIOPSY BY PUNCH METHOD

## 2021-04-30 ASSESSMENT — LOCATION DETAILED DESCRIPTION DERM: LOCATION DETAILED: LEFT INDEX FINGERNAIL

## 2021-04-30 ASSESSMENT — LOCATION SIMPLE DESCRIPTION DERM: LOCATION SIMPLE: LEFT INDEX FINGERNAIL

## 2021-04-30 ASSESSMENT — LOCATION ZONE DERM: LOCATION ZONE: FINGERNAIL

## 2021-04-30 NOTE — PROCEDURE: BIOPSY BY PUNCH METHOD
Detail Level: Detailed
Was A Bandage Applied: Yes
Punch Size In Mm: 4
Biopsy Type: H and E
Anesthesia Type: 1% Xylocaine without epinephrine
Anesthesia Volume In Cc: 0.8
Additional Anesthesia Type: 1% lidocaine with epinephrine and a 1:10 solution of 8.4% sodium bicarbonate
Additional Anesthesia Volume In Cc (Will Not Render If 0): 0.2
Hemostasis: Electrodesiccation
Epidermal Sutures: none, closed by secondary intention
Wound Care: No ointment
Dressing: pressure dressing
Patient Will Remove Sutures At Home?: No
Size Of Lesion In Cm (Optional): 0
Lab: [id number]
Lab Facility: 
Consent: Written consent was obtained and risks were reviewed including but not limited to scarring, infection, bleeding, scabbing, incomplete removal, nerve damage and allergy to anesthesia.
Post-Care Instructions: I reviewed with the patient in detail post-care instructions. Patient is to keep the biopsy site dry overnight, and then apply bacitracin twice daily until healed. Patient may apply hydrogen peroxide soaks to remove any crusting.
Home Suture Removal Text: Patient was provided a home suture removal kit and will remove their sutures at home.  If they have any questions or difficulties they will call the office.
Notification Instructions: Patient will be notified of biopsy results. However, patient instructed to call the office if not contacted within 2 weeks.
Billing Type: Third-Party Bill
Information: Selecting Yes will display possible errors in your note based on the variables you have selected. This validation is only offered as a suggestion for you. PLEASE NOTE THAT THE VALIDATION TEXT WILL BE REMOVED WHEN YOU FINALIZE YOUR NOTE. IF YOU WANT TO FAX A PRELIMINARY NOTE YOU WILL NEED TO TOGGLE THIS TO 'NO' IF YOU DO NOT WANT IT IN YOUR FAXED NOTE.

## 2021-07-22 ENCOUNTER — APPOINTMENT (RX ONLY)
Dept: URBAN - METROPOLITAN AREA CLINIC 4 | Facility: CLINIC | Age: 71
Setting detail: DERMATOLOGY
End: 2021-07-22

## 2021-07-22 DIAGNOSIS — L60.3 NAIL DYSTROPHY: ICD-10-CM

## 2021-07-22 PROCEDURE — ? DIAGNOSIS COMMENT

## 2021-07-22 PROCEDURE — ? OBSERVATION

## 2021-07-22 PROCEDURE — 99212 OFFICE O/P EST SF 10 MIN: CPT

## 2021-07-22 PROCEDURE — ? COUNSELING

## 2021-07-22 ASSESSMENT — LOCATION ZONE DERM: LOCATION ZONE: FINGERNAIL

## 2021-07-22 ASSESSMENT — LOCATION DETAILED DESCRIPTION DERM: LOCATION DETAILED: LEFT INDEX FINGERNAIL

## 2021-07-22 ASSESSMENT — LOCATION SIMPLE DESCRIPTION DERM: LOCATION SIMPLE: LEFT INDEX FINGERNAIL

## 2021-07-22 NOTE — PROCEDURE: MIPS QUALITY
Quality 111:Pneumonia Vaccination Status For Older Adults: Pneumococcal Vaccination Previously Received
Quality 130: Documentation Of Current Medications In The Medical Record: Current Medications Documented
Detail Level: Detailed
Quality 265: Biopsy Follow-Up: Biopsy results reviewed, communicated, tracked, and documented
Quality 226: Preventive Care And Screening: Tobacco Use: Screening And Cessation Intervention: Patient screened for tobacco use and is an ex/non-smoker

## 2021-08-27 DIAGNOSIS — E78.5 DYSLIPIDEMIA: ICD-10-CM

## 2021-08-27 RX ORDER — ATORVASTATIN CALCIUM 10 MG/1
TABLET, FILM COATED ORAL
Qty: 90 TABLET | Refills: 3 | Status: SHIPPED | OUTPATIENT
Start: 2021-08-27 | End: 2022-02-01 | Stop reason: SDUPTHER

## 2021-09-13 ENCOUNTER — HOSPITAL ENCOUNTER (OUTPATIENT)
Dept: RADIOLOGY | Facility: MEDICAL CENTER | Age: 71
End: 2021-09-13
Attending: PHYSICIAN ASSISTANT
Payer: MEDICARE

## 2021-09-13 VITALS — BODY MASS INDEX: 39.49 KG/M2 | HEIGHT: 65 IN | WEIGHT: 237 LBS

## 2021-09-13 DIAGNOSIS — G89.29 CHRONIC PAIN OF LEFT KNEE: ICD-10-CM

## 2021-09-13 DIAGNOSIS — M25.562 CHRONIC PAIN OF LEFT KNEE: ICD-10-CM

## 2021-09-13 PROCEDURE — A9270 NON-COVERED ITEM OR SERVICE: HCPCS | Performed by: STUDENT IN AN ORGANIZED HEALTH CARE EDUCATION/TRAINING PROGRAM

## 2021-09-13 PROCEDURE — 73723 MRI JOINT LWR EXTR W/O&W/DYE: CPT | Mod: LT,ME

## 2021-09-13 PROCEDURE — 700117 HCHG RX CONTRAST REV CODE 255: Performed by: PHYSICIAN ASSISTANT

## 2021-09-13 PROCEDURE — 700102 HCHG RX REV CODE 250 W/ 637 OVERRIDE(OP): Performed by: STUDENT IN AN ORGANIZED HEALTH CARE EDUCATION/TRAINING PROGRAM

## 2021-09-13 PROCEDURE — A9576 INJ PROHANCE MULTIPACK: HCPCS | Performed by: PHYSICIAN ASSISTANT

## 2021-09-13 RX ORDER — DIAZEPAM 5 MG/1
10 TABLET ORAL
Status: COMPLETED | OUTPATIENT
Start: 2021-09-13 | End: 2021-09-13

## 2021-09-13 RX ADMIN — GADOTERIDOL 20 ML: 279.3 INJECTION, SOLUTION INTRAVENOUS at 11:01

## 2021-09-13 RX ADMIN — DIAZEPAM 10 MG: 5 TABLET ORAL at 08:48

## 2021-09-13 ASSESSMENT — FIBROSIS 4 INDEX: FIB4 SCORE: 0.77

## 2021-09-23 ENCOUNTER — NON-PROVIDER VISIT (OUTPATIENT)
Dept: MEDICAL GROUP | Age: 71
End: 2021-09-23
Payer: MEDICARE

## 2021-09-23 DIAGNOSIS — Z23 NEED FOR VACCINATION: ICD-10-CM

## 2021-09-23 PROCEDURE — 90662 IIV NO PRSV INCREASED AG IM: CPT | Performed by: INTERNAL MEDICINE

## 2021-09-23 PROCEDURE — G0008 ADMIN INFLUENZA VIRUS VAC: HCPCS | Performed by: INTERNAL MEDICINE

## 2021-09-23 NOTE — PROGRESS NOTES
"Mini Pham is a 71 y.o. female here for a non-provider visit for:   FLU    Reason for immunization: Annual Flu Vaccine  Immunization records indicate need for vaccine: Yes, confirmed with Epic  Minimum interval has been met for this vaccine: Yes  ABN completed: Not Indicated    VIS Dated  8-6-21 was given to patient: Yes  All IAC Questionnaire questions were answered \"No.\"    Patient tolerated injection and no adverse effects were observed or reported: Yes    Pt scheduled for next dose in series: Not Indicated    "

## 2021-12-17 DIAGNOSIS — I10 ESSENTIAL HYPERTENSION: ICD-10-CM

## 2021-12-19 RX ORDER — LOSARTAN POTASSIUM 50 MG/1
TABLET ORAL
Qty: 20 TABLET | Refills: 0 | Status: SHIPPED | OUTPATIENT
Start: 2021-12-19 | End: 2022-01-05

## 2021-12-29 ENCOUNTER — HOSPITAL ENCOUNTER (OUTPATIENT)
Dept: LAB | Facility: MEDICAL CENTER | Age: 71
End: 2021-12-29
Attending: INTERNAL MEDICINE
Payer: MEDICARE

## 2021-12-29 DIAGNOSIS — I10 ESSENTIAL HYPERTENSION: ICD-10-CM

## 2021-12-29 DIAGNOSIS — D75.839 THROMBOCYTOSIS: ICD-10-CM

## 2021-12-29 DIAGNOSIS — E78.5 DYSLIPIDEMIA: ICD-10-CM

## 2021-12-29 DIAGNOSIS — E55.9 HYPOVITAMINOSIS D: ICD-10-CM

## 2021-12-29 DIAGNOSIS — D50.9 MICROCYTIC ANEMIA: ICD-10-CM

## 2021-12-29 DIAGNOSIS — R73.01 IMPAIRED FASTING GLUCOSE: ICD-10-CM

## 2021-12-29 LAB
25(OH)D3 SERPL-MCNC: 58 NG/ML (ref 30–100)
ALBUMIN SERPL BCP-MCNC: 4.5 G/DL (ref 3.2–4.9)
ALBUMIN/GLOB SERPL: 1.6 G/DL
ALP SERPL-CCNC: 125 U/L (ref 30–99)
ALT SERPL-CCNC: 15 U/L (ref 2–50)
ANION GAP SERPL CALC-SCNC: 10 MMOL/L (ref 7–16)
AST SERPL-CCNC: 16 U/L (ref 12–45)
BASOPHILS # BLD AUTO: 1.4 % (ref 0–1.8)
BASOPHILS # BLD: 0.08 K/UL (ref 0–0.12)
BILIRUB SERPL-MCNC: 0.4 MG/DL (ref 0.1–1.5)
BUN SERPL-MCNC: 9 MG/DL (ref 8–22)
CALCIUM SERPL-MCNC: 9.3 MG/DL (ref 8.5–10.5)
CHLORIDE SERPL-SCNC: 100 MMOL/L (ref 96–112)
CHOLEST SERPL-MCNC: 208 MG/DL (ref 100–199)
CO2 SERPL-SCNC: 26 MMOL/L (ref 20–33)
CREAT SERPL-MCNC: 0.54 MG/DL (ref 0.5–1.4)
EOSINOPHIL # BLD AUTO: 0.15 K/UL (ref 0–0.51)
EOSINOPHIL NFR BLD: 2.6 % (ref 0–6.9)
ERYTHROCYTE [DISTWIDTH] IN BLOOD BY AUTOMATED COUNT: 40.8 FL (ref 35.9–50)
FASTING STATUS PATIENT QL REPORTED: NORMAL
GLOBULIN SER CALC-MCNC: 2.8 G/DL (ref 1.9–3.5)
GLUCOSE SERPL-MCNC: 86 MG/DL (ref 65–99)
HCT VFR BLD AUTO: 47.7 % (ref 37–47)
HDLC SERPL-MCNC: 63 MG/DL
HGB BLD-MCNC: 15.8 G/DL (ref 12–16)
IMM GRANULOCYTES # BLD AUTO: 0.01 K/UL (ref 0–0.11)
IMM GRANULOCYTES NFR BLD AUTO: 0.2 % (ref 0–0.9)
LDLC SERPL CALC-MCNC: 115 MG/DL
LYMPHOCYTES # BLD AUTO: 1.75 K/UL (ref 1–4.8)
LYMPHOCYTES NFR BLD: 30.9 % (ref 22–41)
MCH RBC QN AUTO: 28.6 PG (ref 27–33)
MCHC RBC AUTO-ENTMCNC: 33.1 G/DL (ref 33.6–35)
MCV RBC AUTO: 86.3 FL (ref 81.4–97.8)
MONOCYTES # BLD AUTO: 0.47 K/UL (ref 0–0.85)
MONOCYTES NFR BLD AUTO: 8.3 % (ref 0–13.4)
NEUTROPHILS # BLD AUTO: 3.21 K/UL (ref 2–7.15)
NEUTROPHILS NFR BLD: 56.6 % (ref 44–72)
NRBC # BLD AUTO: 0 K/UL
NRBC BLD-RTO: 0 /100 WBC
PLATELET # BLD AUTO: 347 K/UL (ref 164–446)
PMV BLD AUTO: 9.9 FL (ref 9–12.9)
POTASSIUM SERPL-SCNC: 3.9 MMOL/L (ref 3.6–5.5)
PROT SERPL-MCNC: 7.3 G/DL (ref 6–8.2)
RBC # BLD AUTO: 5.53 M/UL (ref 4.2–5.4)
SODIUM SERPL-SCNC: 136 MMOL/L (ref 135–145)
TRIGL SERPL-MCNC: 152 MG/DL (ref 0–149)
WBC # BLD AUTO: 5.7 K/UL (ref 4.8–10.8)

## 2021-12-29 PROCEDURE — 36415 COLL VENOUS BLD VENIPUNCTURE: CPT

## 2021-12-29 PROCEDURE — 85025 COMPLETE CBC W/AUTO DIFF WBC: CPT

## 2021-12-29 PROCEDURE — 82306 VITAMIN D 25 HYDROXY: CPT

## 2021-12-29 PROCEDURE — 80061 LIPID PANEL: CPT

## 2021-12-29 PROCEDURE — 80053 COMPREHEN METABOLIC PANEL: CPT

## 2022-01-03 DIAGNOSIS — Z86.59 HISTORY OF PANIC ATTACKS: ICD-10-CM

## 2022-01-03 DIAGNOSIS — F32.5 MAJOR DEPRESSION IN REMISSION (HCC): ICD-10-CM

## 2022-01-03 DIAGNOSIS — F41.1 GAD (GENERALIZED ANXIETY DISORDER): ICD-10-CM

## 2022-01-03 RX ORDER — CITALOPRAM 20 MG/1
TABLET ORAL
Qty: 90 TABLET | Refills: 0 | Status: SHIPPED | OUTPATIENT
Start: 2022-01-03 | End: 2022-02-01 | Stop reason: SDUPTHER

## 2022-01-04 DIAGNOSIS — I10 ESSENTIAL HYPERTENSION: ICD-10-CM

## 2022-01-05 RX ORDER — LOSARTAN POTASSIUM 50 MG/1
TABLET ORAL
Qty: 20 TABLET | Refills: 0 | Status: SHIPPED | OUTPATIENT
Start: 2022-01-05 | End: 2022-01-16

## 2022-01-15 DIAGNOSIS — I10 ESSENTIAL HYPERTENSION: ICD-10-CM

## 2022-01-16 RX ORDER — LOSARTAN POTASSIUM 50 MG/1
TABLET ORAL
Qty: 20 TABLET | Refills: 0 | Status: SHIPPED | OUTPATIENT
Start: 2022-01-16 | End: 2022-01-27

## 2022-01-17 ENCOUNTER — HOSPITAL ENCOUNTER (OUTPATIENT)
Dept: RADIOLOGY | Facility: MEDICAL CENTER | Age: 72
End: 2022-01-17
Attending: INTERNAL MEDICINE
Payer: MEDICARE

## 2022-01-17 DIAGNOSIS — Z12.31 VISIT FOR SCREENING MAMMOGRAM: ICD-10-CM

## 2022-01-17 PROCEDURE — 77063 BREAST TOMOSYNTHESIS BI: CPT

## 2022-01-26 DIAGNOSIS — I10 ESSENTIAL HYPERTENSION: ICD-10-CM

## 2022-01-27 RX ORDER — LOSARTAN POTASSIUM 50 MG/1
TABLET ORAL
Qty: 20 TABLET | Refills: 0 | Status: SHIPPED | OUTPATIENT
Start: 2022-01-27 | End: 2022-02-01 | Stop reason: SDUPTHER

## 2022-02-01 ENCOUNTER — TELEMEDICINE (OUTPATIENT)
Dept: MEDICAL GROUP | Age: 72
End: 2022-02-01
Payer: MEDICARE

## 2022-02-01 VITALS — BODY MASS INDEX: 39.49 KG/M2 | HEIGHT: 65 IN | TEMPERATURE: 97.3 F | WEIGHT: 237 LBS

## 2022-02-01 DIAGNOSIS — R73.01 IMPAIRED FASTING GLUCOSE: ICD-10-CM

## 2022-02-01 DIAGNOSIS — F41.1 GAD (GENERALIZED ANXIETY DISORDER): ICD-10-CM

## 2022-02-01 DIAGNOSIS — R74.8 ELEVATED ALKALINE PHOSPHATASE LEVEL: ICD-10-CM

## 2022-02-01 DIAGNOSIS — I10 ESSENTIAL HYPERTENSION: ICD-10-CM

## 2022-02-01 DIAGNOSIS — D75.839 THROMBOCYTOSIS: ICD-10-CM

## 2022-02-01 DIAGNOSIS — Z86.59 HISTORY OF PANIC ATTACKS: ICD-10-CM

## 2022-02-01 DIAGNOSIS — E55.9 HYPOVITAMINOSIS D: ICD-10-CM

## 2022-02-01 DIAGNOSIS — F32.5 MAJOR DEPRESSION IN REMISSION (HCC): ICD-10-CM

## 2022-02-01 DIAGNOSIS — D51.0 PERNICIOUS ANEMIA: ICD-10-CM

## 2022-02-01 DIAGNOSIS — K29.40 ATROPHIC GASTRITIS WITHOUT HEMORRHAGE: ICD-10-CM

## 2022-02-01 DIAGNOSIS — E78.5 DYSLIPIDEMIA: ICD-10-CM

## 2022-02-01 PROCEDURE — 99213 OFFICE O/P EST LOW 20 MIN: CPT | Mod: 95 | Performed by: INTERNAL MEDICINE

## 2022-02-01 RX ORDER — LOSARTAN POTASSIUM 50 MG/1
75 TABLET ORAL DAILY
Qty: 90 TABLET | Refills: 1 | Status: SHIPPED | OUTPATIENT
Start: 2022-02-01 | End: 2022-02-28 | Stop reason: SDUPTHER

## 2022-02-01 RX ORDER — ATORVASTATIN CALCIUM 10 MG/1
10 TABLET, FILM COATED ORAL DAILY
Qty: 90 TABLET | Refills: 3 | Status: SHIPPED | OUTPATIENT
Start: 2022-02-01 | End: 2022-08-01 | Stop reason: SDUPTHER

## 2022-02-01 RX ORDER — CITALOPRAM 20 MG/1
20 TABLET ORAL DAILY
Qty: 90 TABLET | Refills: 1 | Status: SHIPPED | OUTPATIENT
Start: 2022-02-01 | End: 2022-08-01 | Stop reason: SDUPTHER

## 2022-02-01 ASSESSMENT — PATIENT HEALTH QUESTIONNAIRE - PHQ9
SUM OF ALL RESPONSES TO PHQ QUESTIONS 1-9: 0
SUM OF ALL RESPONSES TO PHQ9 QUESTIONS 1 AND 2: 0
4. FEELING TIRED OR HAVING LITTLE ENERGY: NOT AT ALL
6. FEELING BAD ABOUT YOURSELF - OR THAT YOU ARE A FAILURE OR HAVE LET YOURSELF OR YOUR FAMILY DOWN: NOT AL ALL
9. THOUGHTS THAT YOU WOULD BE BETTER OFF DEAD, OR OF HURTING YOURSELF: NOT AT ALL
3. TROUBLE FALLING OR STAYING ASLEEP OR SLEEPING TOO MUCH: NOT AT ALL
8. MOVING OR SPEAKING SO SLOWLY THAT OTHER PEOPLE COULD HAVE NOTICED. OR THE OPPOSITE, BEING SO FIGETY OR RESTLESS THAT YOU HAVE BEEN MOVING AROUND A LOT MORE THAN USUAL: NOT AT ALL
2. FEELING DOWN, DEPRESSED, IRRITABLE, OR HOPELESS: NOT AT ALL
1. LITTLE INTEREST OR PLEASURE IN DOING THINGS: NOT AT ALL
7. TROUBLE CONCENTRATING ON THINGS, SUCH AS READING THE NEWSPAPER OR WATCHING TELEVISION: NOT AT ALL
5. POOR APPETITE OR OVEREATING: NOT AT ALL

## 2022-02-01 ASSESSMENT — FIBROSIS 4 INDEX: FIB4 SCORE: 0.83

## 2022-02-02 NOTE — PROGRESS NOTES
Telemedicine Visit: Established Patient     This Remote Face to Face encounter was conducted via Zoom. Given the importance of social distancing and other strategies recommended to reduce the risk of COVID-19 transmission, I am providing medical care to this patient via audio/video visit in place of an in person visit at the request of the patient. Verbal consent to telehealth, risks, benefits, and consequences were discussed. Patient retains the right to withdraw at any time. All existing confidentiality protections apply. The patient has access to all transmitted medical information. No dissemination of any patient images or information to other entities without further written consent.    CHIEF COMPLAINT     Chief Complaint   Patient presents with   • Medication Refill   • Lab Results   Hypertension    HPI  Mini Pham is a 72 y.o. female who presents today for the following     Hypertension  Meds: losartan, 50 mg - taking 75 mg QD              -  taking as prescribed.   Measuring BP at home: no  Denies: - headaches, vision problems, tinnitus.  - chest pain/pressure, palpitations, irregular heart beats, exertional, dyspnea, peripheral edema.  Low salt diet: advised  Diet: regular  Exercise: limited  BMI: 39  FH of HTN: unknown  Reviewed renal panel.     Dyslipidemia  The patient is on atorvastatin, 10 mg, taking daily, as prescribed. No myalgias, muscle cramps or pain.   Diet /Exercise/BMI: As above  FH: sister     IFG  The patient had elevated FBG.  No polydipsia, polyphagia, polyuria.  No abdominal pain, weight loss, fatigue.  Diet/exercise/BMI: As above  FH of DM: MGF, sister     Atrophic gastritis, Pernicious anemia, thrombocytosis  The patient was diagnosed with atrophic gastritis on EGD  Labs showed microcytic anemia, controlled/normal B12 level, high PLT count improved.  Placed on vitamin B12 OTC 2500 mcg Q other day              -  improved B12 level and CBC.  Reviewed labs.     Hypovitaminosis D,  Elevated AP  The patient had low vitamin D level at.  Vitamin D supplement: was on high dose.    Anxiety, h/o panic attacks, depression  Onset:  Since her 20'  Course: stable  Mood/anxiety currently does not affect: daily activities/sleep.  Current treatment: celexa     Risk factors:   · Depression, anxiety  · H/o phobia: no  · H/o panic attacks: no  · H/o hypomanic or manic episode: no  · Substance abuse  (alcohol,  prescription drugs caffeine, tobacco): no  · Family support: yes  · Living alone:  no  · Family history of psych disorders: sisters x 2  · Stress: no  · PMH of abuse (sexual, physical, emotional abuse; neglect): no    Reviewed PMH, PSH, FH, SH, ALL, HCM/IMM, no changes  Reviewed MEDS, no changes    Patient Active Problem List    Diagnosis Date Noted   • Elevated alkaline phosphatase level 02/13/2020   • Hypovitaminosis D 02/13/2020   • BMI 40.0-44.9, adult ([facility]) 10/08/2019   • Major depression in remission ([facility]) 10/08/2019   • LUCY (generalized anxiety disorder) 10/08/2019   • Atrophic gastritis without hemorrhage 10/08/2019   • Thrombocytosis 10/08/2019   • Microcytic anemia 10/08/2019   • Essential hypertension 02/25/2019   • Dyslipidemia 02/25/2019   • IFG (impaired fasting glucose) 02/25/2019   • Primary osteoarthritis of both knees 02/25/2019   • Pernicious anemia 02/25/2019   • Health care maintenance 02/25/2019     CURRENT MEDICATIONS  Current Outpatient Medications   Medication Sig Dispense Refill   • losartan (COZAAR) 50 MG Tab TAKE 1 AND 1/2 TABLETS     DAILY 20 Tablet 0   • citalopram (CELEXA) 20 MG Tab TAKE 1 TABLET DAILY 90 Tablet 0   • atorvastatin (LIPITOR) 10 MG Tab TAKE 1 TABLET DAILY 90 Tablet 3   • VITAMIN D PO Take 2,000 Units by mouth.     • Cyanocobalamin (VITAMIN B 12 PO) Take 2,500 mcg by mouth. Under tongue       No current facility-administered medications for this visit.     ALLERGIES  Allergies: Patient has no known allergies.  PAST MEDICAL HISTORY  Past Medical History:    Diagnosis Date   • Anemia, pernicious    • Anxiety    • Atrophic gastritis    • Hyperlipidemia    • Hypertension    • Osteoarthritis    • Prediabetes     fasting glucose 102   • Tendonitis     chronic, elbows and wrists     SURGICAL HISTORY  She  has a past surgical history that includes cholecystectomy ().  SOCIAL HISTORY  Social History     Tobacco Use   • Smoking status: Former Smoker     Packs/day: 1.00     Years: 30.00     Pack years: 30.00     Types: Cigarettes     Quit date:      Years since quittin.1   • Smokeless tobacco: Never Used   Vaping Use   • Vaping Use: Never used   Substance Use Topics   • Alcohol use: Yes     Alcohol/week: 0.6 oz     Types: 1 Glasses of wine per week     Comment: 3 times/week   • Drug use: Not Currently     Types: Cocaine, Marijuana     Comment: Remote, 50 years ago     Social History     Social History Narrative   • Not on file     FAMILY HISTORY  Family History   Problem Relation Age of Onset   • Alzheimer's Disease Mother 50        early onset   • Cancer Father 50        laryngeal, mets to bladder   • Heart Attack Sister 56   • Diabetes Sister    • Hyperlipidemia Sister    • Depression Sister    • Cancer Maternal Aunt 50        colon   • Colon Cancer Sister 69   • Depression Sister    • No Known Problems Maternal Grandmother    • Diabetes Maternal Grandfather         late onset   • No Known Problems Paternal Grandmother    • No Known Problems Paternal Grandfather    • Hypertension Neg Hx      Family Status   Relation Name Status   • Mo  (Not Specified)   • Fa  (Not Specified)   • Mónica Ledezma    • MAnéstor     • Mónica Rueda Alive   • MGMo     • MGFa     • PGMo     • PGFa     • Neg Hx  (Not Specified)     ROS   Constitutional: Negative for fever, chills, fatigue.  HENT: Negative for congestion, sore throat.  Eyes: Negative for vision problems.   Respiratory: Negative for cough, shortness of breath.  Cardiovascular:  "Negative for chest pain, palpitations.   Gastrointestinal: Negative for heartburn, nausea, abdominal pain.   Genitourinary: Negative for dysuria.  Musculoskeletal: Negative for significant myalgia, back and joint pain.   Skin: Negative for rash.   Neuro: Negative for dizziness, weakness and headaches.   Endo/Heme/Allergies: Does not bruise/bleed easily.   Psychiatric/Behavioral: Negative for depression.    Objective   Vitals obtained by patient:  Temperature 36.3 °C (97.3 °F)   Height 1.651 m (5' 5\")   Weight 108 kg (237 lb) Comment: pt stated  Body Mass Index 39.44 kg/m²   Physical Exam:  Constitutional: Alert, no distress, well-groomed.  Skin: No rash in visible areas.  Eye: Round. Conjunctiva clear, lids normal.  ENMT: Lips pink without lesions, good dentition. Phonation normal.  Neck: No visible masses or thyromegaly. Moves freely without pain.  CV: no peripheral cyanosis, tachycardia.  Respiratory: Unlabored respiratory effort, no cough or audible wheezing.  Psych: Alert and oriented x3, normal affect and mood.     Labs     Labs are reviewed and discussed with a patient  Lab Results   Component Value Date/Time    CHOLSTRLTOT 208 (H) 12/29/2021 09:56 AM     (H) 12/29/2021 09:56 AM    HDL 63 12/29/2021 09:56 AM    TRIGLYCERIDE 152 (H) 12/29/2021 09:56 AM       Lab Results   Component Value Date/Time    SODIUM 136 12/29/2021 09:56 AM    POTASSIUM 3.9 12/29/2021 09:56 AM    CHLORIDE 100 12/29/2021 09:56 AM    CO2 26 12/29/2021 09:56 AM    GLUCOSE 86 12/29/2021 09:56 AM    BUN 9 12/29/2021 09:56 AM    CREATININE 0.54 12/29/2021 09:56 AM     Lab Results   Component Value Date/Time    ALKPHOSPHAT 125 (H) 12/29/2021 09:56 AM    ASTSGOT 16 12/29/2021 09:56 AM    ALTSGPT 15 12/29/2021 09:56 AM    TBILIRUBIN 0.4 12/29/2021 09:56 AM      Lab Results   Component Value Date/Time    HBA1C 5.2 02/26/2021 09:27 AM    HBA1C 5.5 09/02/2020 08:30 AM    HBA1C 5.4 10/02/2019 08:42 AM     No results found for: TSH  No " results found for: FREECODY4    Lab Results   Component Value Date/Time    WBC 5.7 12/29/2021 09:56 AM    RBC 5.53 (H) 12/29/2021 09:56 AM    HEMOGLOBIN 15.8 12/29/2021 09:56 AM    HEMATOCRIT 47.7 (H) 12/29/2021 09:56 AM    MCV 86.3 12/29/2021 09:56 AM    MCH 28.6 12/29/2021 09:56 AM    MCHC 33.1 (L) 12/29/2021 09:56 AM    MPV 9.9 12/29/2021 09:56 AM    NEUTSPOLYS 56.60 12/29/2021 09:56 AM    LYMPHOCYTES 30.90 12/29/2021 09:56 AM    MONOCYTES 8.30 12/29/2021 09:56 AM    EOSINOPHILS 2.60 12/29/2021 09:56 AM    BASOPHILS 1.40 12/29/2021 09:56 AM    HYPOCHROMIA 1+ 10/02/2019 08:42 AM    ANISOCYTOSIS 2+ 02/07/2020 09:32 AM      Imaging      None    Assessment and Plan     Mini Pham is a 72 y.o. female    1. Essential hypertension  - Controlled, continue with current management.  - losartan (COZAAR) 50 MG Tab; Take 1.5 Tablets by mouth every day.  Dispense: 90 Tablet; Refill: 1    2. Dyslipidemia  Lipid panel was slightly abnormal, discussed treatment options  Decided to continue current treatment, follow-up in 6 months and possibly increase atorvastatin dose  - atorvastatin (LIPITOR) 10 MG Tab; Take 1 Tablet by mouth every day.  Dispense: 90 Tablet; Refill: 3    3. IFG (impaired fasting glucose)  - Discussed about risk to develop DM.   - Advised low carb diet, exercise, watch for WT.     4. Atrophic gastritis without hemorrhage  5. Pernicious anemia  6. Thrombocytosis  - Controlled, continue with current management, hematology follow-up    7. Hypovitaminosis D  - Controlled, continue with current management.    8. Elevated alkaline phosphatase level  Mild, stable    9. ULCY (generalized anxiety disorder)  - Controlled, continue with current management.  - citalopram (CELEXA) 20 MG Tab; Take 1 Tablet by mouth every day.  Dispense: 90 Tablet; Refill: 1  10. History of panic attacks  - citalopram (CELEXA) 20 MG Tab; Take 1 Tablet by mouth every day.  Dispense: 90 Tablet; Refill: 1  11. Major depression in remission  (HCC)  - citalopram (CELEXA) 20 MG Tab; Take 1 Tablet by mouth every day.  Dispense: 90 Tablet; Refill: 1    Follow-up: In 6 months, labs

## 2022-02-28 DIAGNOSIS — I10 ESSENTIAL HYPERTENSION: ICD-10-CM

## 2022-02-28 RX ORDER — LOSARTAN POTASSIUM 50 MG/1
75 TABLET ORAL DAILY
Qty: 135 TABLET | Refills: 1 | Status: SHIPPED | OUTPATIENT
Start: 2022-02-28 | End: 2022-08-01 | Stop reason: SDUPTHER

## 2022-07-20 ENCOUNTER — HOSPITAL ENCOUNTER (OUTPATIENT)
Dept: LAB | Facility: MEDICAL CENTER | Age: 72
End: 2022-07-20
Attending: INTERNAL MEDICINE
Payer: MEDICARE

## 2022-07-20 DIAGNOSIS — I10 ESSENTIAL HYPERTENSION: ICD-10-CM

## 2022-07-20 DIAGNOSIS — D50.9 MICROCYTIC ANEMIA: ICD-10-CM

## 2022-07-20 DIAGNOSIS — E55.9 HYPOVITAMINOSIS D: ICD-10-CM

## 2022-07-20 DIAGNOSIS — R73.01 IMPAIRED FASTING GLUCOSE: ICD-10-CM

## 2022-07-20 DIAGNOSIS — E78.5 DYSLIPIDEMIA: ICD-10-CM

## 2022-07-20 DIAGNOSIS — D75.839 THROMBOCYTOSIS: ICD-10-CM

## 2022-07-20 LAB
25(OH)D3 SERPL-MCNC: 60 NG/ML (ref 30–100)
ALBUMIN SERPL BCP-MCNC: 4.4 G/DL (ref 3.2–4.9)
ALBUMIN/GLOB SERPL: 1.7 G/DL
ALP SERPL-CCNC: 115 U/L (ref 30–99)
ALT SERPL-CCNC: 8 U/L (ref 2–50)
ANION GAP SERPL CALC-SCNC: 11 MMOL/L (ref 7–16)
AST SERPL-CCNC: 14 U/L (ref 12–45)
BASOPHILS # BLD AUTO: 1.4 % (ref 0–1.8)
BASOPHILS # BLD: 0.07 K/UL (ref 0–0.12)
BILIRUB SERPL-MCNC: 0.4 MG/DL (ref 0.1–1.5)
BUN SERPL-MCNC: 8 MG/DL (ref 8–22)
CALCIUM SERPL-MCNC: 9 MG/DL (ref 8.5–10.5)
CHLORIDE SERPL-SCNC: 105 MMOL/L (ref 96–112)
CHOLEST SERPL-MCNC: 196 MG/DL (ref 100–199)
CO2 SERPL-SCNC: 22 MMOL/L (ref 20–33)
CREAT SERPL-MCNC: 0.6 MG/DL (ref 0.5–1.4)
EOSINOPHIL # BLD AUTO: 0.14 K/UL (ref 0–0.51)
EOSINOPHIL NFR BLD: 2.8 % (ref 0–6.9)
ERYTHROCYTE [DISTWIDTH] IN BLOOD BY AUTOMATED COUNT: 39.4 FL (ref 35.9–50)
FASTING STATUS PATIENT QL REPORTED: NORMAL
GFR SERPLBLD CREATININE-BSD FMLA CKD-EPI: 96 ML/MIN/1.73 M 2
GLOBULIN SER CALC-MCNC: 2.6 G/DL (ref 1.9–3.5)
GLUCOSE SERPL-MCNC: 89 MG/DL (ref 65–99)
HCT VFR BLD AUTO: 47.4 % (ref 37–47)
HDLC SERPL-MCNC: 53 MG/DL
HGB BLD-MCNC: 15.7 G/DL (ref 12–16)
IMM GRANULOCYTES # BLD AUTO: 0.01 K/UL (ref 0–0.11)
IMM GRANULOCYTES NFR BLD AUTO: 0.2 % (ref 0–0.9)
LDLC SERPL CALC-MCNC: 109 MG/DL
LYMPHOCYTES # BLD AUTO: 1.53 K/UL (ref 1–4.8)
LYMPHOCYTES NFR BLD: 30.1 % (ref 22–41)
MCH RBC QN AUTO: 28.2 PG (ref 27–33)
MCHC RBC AUTO-ENTMCNC: 33.1 G/DL (ref 33.6–35)
MCV RBC AUTO: 85.1 FL (ref 81.4–97.8)
MONOCYTES # BLD AUTO: 0.46 K/UL (ref 0–0.85)
MONOCYTES NFR BLD AUTO: 9.1 % (ref 0–13.4)
NEUTROPHILS # BLD AUTO: 2.87 K/UL (ref 2–7.15)
NEUTROPHILS NFR BLD: 56.4 % (ref 44–72)
NRBC # BLD AUTO: 0 K/UL
NRBC BLD-RTO: 0 /100 WBC
PLATELET # BLD AUTO: 369 K/UL (ref 164–446)
PMV BLD AUTO: 9.8 FL (ref 9–12.9)
POTASSIUM SERPL-SCNC: 4 MMOL/L (ref 3.6–5.5)
PROT SERPL-MCNC: 7 G/DL (ref 6–8.2)
RBC # BLD AUTO: 5.57 M/UL (ref 4.2–5.4)
SODIUM SERPL-SCNC: 138 MMOL/L (ref 135–145)
TRIGL SERPL-MCNC: 169 MG/DL (ref 0–149)
WBC # BLD AUTO: 5.1 K/UL (ref 4.8–10.8)

## 2022-07-20 PROCEDURE — 85025 COMPLETE CBC W/AUTO DIFF WBC: CPT

## 2022-07-20 PROCEDURE — 82306 VITAMIN D 25 HYDROXY: CPT

## 2022-07-20 PROCEDURE — 80053 COMPREHEN METABOLIC PANEL: CPT

## 2022-07-20 PROCEDURE — 80061 LIPID PANEL: CPT

## 2022-07-20 PROCEDURE — 36415 COLL VENOUS BLD VENIPUNCTURE: CPT

## 2022-08-01 ENCOUNTER — TELEMEDICINE (OUTPATIENT)
Dept: MEDICAL GROUP | Age: 72
End: 2022-08-01
Payer: MEDICARE

## 2022-08-01 VITALS — BODY MASS INDEX: 39.49 KG/M2 | WEIGHT: 237 LBS | HEIGHT: 65 IN

## 2022-08-01 DIAGNOSIS — F41.1 GAD (GENERALIZED ANXIETY DISORDER): ICD-10-CM

## 2022-08-01 DIAGNOSIS — E78.5 DYSLIPIDEMIA: ICD-10-CM

## 2022-08-01 DIAGNOSIS — Z00.00 HEALTH CARE MAINTENANCE: ICD-10-CM

## 2022-08-01 DIAGNOSIS — F32.5 MAJOR DEPRESSION IN REMISSION (HCC): ICD-10-CM

## 2022-08-01 DIAGNOSIS — D51.0 PERNICIOUS ANEMIA: ICD-10-CM

## 2022-08-01 DIAGNOSIS — I10 ESSENTIAL HYPERTENSION: ICD-10-CM

## 2022-08-01 DIAGNOSIS — Z00.00 MEDICARE ANNUAL WELLNESS VISIT, SUBSEQUENT: ICD-10-CM

## 2022-08-01 DIAGNOSIS — K29.40 ATROPHIC GASTRITIS WITHOUT HEMORRHAGE: ICD-10-CM

## 2022-08-01 DIAGNOSIS — Z86.59 HISTORY OF PANIC ATTACKS: ICD-10-CM

## 2022-08-01 PROBLEM — D50.9 MICROCYTIC ANEMIA: Status: RESOLVED | Noted: 2019-10-08 | Resolved: 2022-08-01

## 2022-08-01 PROBLEM — R74.8 ELEVATED ALKALINE PHOSPHATASE LEVEL: Status: RESOLVED | Noted: 2020-02-13 | Resolved: 2022-08-01

## 2022-08-01 PROBLEM — E55.9 HYPOVITAMINOSIS D: Status: RESOLVED | Noted: 2020-02-13 | Resolved: 2022-08-01

## 2022-08-01 PROBLEM — R73.01 IMPAIRED FASTING GLUCOSE: Status: RESOLVED | Noted: 2019-02-25 | Resolved: 2022-08-01

## 2022-08-01 PROBLEM — D75.839 THROMBOCYTOSIS: Status: RESOLVED | Noted: 2019-10-08 | Resolved: 2022-08-01

## 2022-08-01 PROCEDURE — G0439 PPPS, SUBSEQ VISIT: HCPCS | Mod: 95 | Performed by: INTERNAL MEDICINE

## 2022-08-01 RX ORDER — CITALOPRAM 20 MG/1
20 TABLET ORAL DAILY
Qty: 90 TABLET | Refills: 1 | Status: SHIPPED | OUTPATIENT
Start: 2022-08-01 | End: 2022-08-17 | Stop reason: SDUPTHER

## 2022-08-01 RX ORDER — ATORVASTATIN CALCIUM 10 MG/1
10 TABLET, FILM COATED ORAL DAILY
Qty: 90 TABLET | Refills: 3 | Status: SHIPPED | OUTPATIENT
Start: 2022-08-01 | End: 2022-08-17 | Stop reason: SDUPTHER

## 2022-08-01 RX ORDER — LOSARTAN POTASSIUM 50 MG/1
75 TABLET ORAL DAILY
Qty: 135 TABLET | Refills: 1 | Status: SHIPPED | OUTPATIENT
Start: 2022-08-01 | End: 2022-08-17 | Stop reason: SDUPTHER

## 2022-08-01 ASSESSMENT — FIBROSIS 4 INDEX: FIB4 SCORE: 0.94

## 2022-08-01 ASSESSMENT — PATIENT HEALTH QUESTIONNAIRE - PHQ9: CLINICAL INTERPRETATION OF PHQ2 SCORE: 0

## 2022-08-01 ASSESSMENT — ENCOUNTER SYMPTOMS: GENERAL WELL-BEING: GOOD

## 2022-08-01 ASSESSMENT — ACTIVITIES OF DAILY LIVING (ADL): BATHING_REQUIRES_ASSISTANCE: 0

## 2022-08-01 NOTE — PROGRESS NOTES
Telemedicine Visit: Established Patient     This Remote Face to Face encounter was conducted via Zoom. Given the importance of social distancing and other strategies recommended to reduce the risk of COVID-19 transmission, I am providing medical care to this patient via audio/video visit in place of an in person visit at the request of the patient. Verbal consent to telehealth, risks, benefits, and consequences were discussed. Patient retains the right to withdraw at any time. All existing confidentiality protections apply. The patient has access to all transmitted medical information. No dissemination of any patient images or information to other entities without further written consent.    CHIEF COMPLAINT      Patient presents with   • Annual Wellness Visit     Lab review     HPI:  Mini is a 72 y.o. here for Medicare Annual Wellness Visit    Patient Active Problem List    Diagnosis Date Noted   • Elevated alkaline phosphatase level 02/13/2020   • Hypovitaminosis D 02/13/2020   • Major depression in remission (HCC) 10/08/2019   • LUCY (generalized anxiety disorder) 10/08/2019   • Atrophic gastritis without hemorrhage 10/08/2019   • Thrombocytosis 10/08/2019   • Microcytic anemia 10/08/2019   • Essential hypertension 02/25/2019   • Dyslipidemia 02/25/2019   • IFG (impaired fasting glucose) 02/25/2019   • Primary osteoarthritis of both knees 02/25/2019   • Pernicious anemia 02/25/2019   • Health care maintenance 02/25/2019     Current Outpatient Medications   Medication Sig Dispense Refill   • losartan (COZAAR) 50 MG Tab Take 1.5 Tablets by mouth every day. 135 Tablet 1   • citalopram (CELEXA) 20 MG Tab Take 1 Tablet by mouth every day. 90 Tablet 1   • atorvastatin (LIPITOR) 10 MG Tab Take 1 Tablet by mouth every day. 90 Tablet 3   • VITAMIN D PO Take 2,000 Units by mouth.     • Cyanocobalamin (VITAMIN B 12 PO) Take 2,500 mcg by mouth. Under tongue       No current facility-administered medications for this visit.         Patient is taking medications as noted in medication list.  Current supplements as per medication list.     Allergies: Patient has no known allergies.    Current social contact/activities: Exercise classes    Is patient current with immunizations? Yes.    She  reports that she quit smoking about 30 years ago. Her smoking use included cigarettes. She has a 30.00 pack-year smoking history. She has never used smokeless tobacco. She reports current alcohol use of about 0.6 oz of alcohol per week. She reports previous drug use. Drugs: Cocaine and Marijuana.  Counseling given: Not Answered    DPA/Advanced directive: Patient does not have an Advanced Directive.  A packet and workshop information was given on Advanced Directives.    ROS:    Gait: Uses no assistive device   Ostomy: No   Other tubes: No   Amputations: No   Chronic oxygen use No   Last eye exam July 2021   Wears hearing aids: No   : Denies any urinary leakage during the last 6 months    Screening:    Depression Screening  Little interest or pleasure in doing things?  0 - not at all  Feeling down, depressed, or hopeless? 0 - not at all  Patient Health Questionnaire Score: 0    Screening for Cognitive Impairment  Three Minute Recall (daughter, heaven, mountain)  3/3    Boris clock face with all 12 numbers and set the hands to show 10 past 11.  Yes    If cognitive concerns identified, deferred for follow up unless specifically addressed in assessment and plan.    Fall Risk Assessment  Has the patient had two or more falls in the last year or any fall with injury in the last year?  No  If fall risk identified, deferred for follow up unless specifically addressed in assessment and plan.    Safety Assessment  Throw rugs on floor.  Yes  Handrails on all stairs.  Yes  Good lighting in all hallways.  Yes  Difficulty hearing.  No  Patient counseled about all safety risks that were identified.    Functional Assessment ADLs  Are there any barriers preventing you  from cooking for yourself or meeting nutritional needs?  No.    Are there any barriers preventing you from driving safely or obtaining transportation?  No.    Are there any barriers preventing you from using a telephone or calling for help?  No.    Are there any barriers preventing you from shopping?  No.    Are there any barriers preventing you from taking care of your own finances?  No.    Are there any barriers preventing you from managing your medications?  No.    Are there any barriers preventing you from showering, bathing or dressing yourself?  No.    Are you currently engaging in any exercise or physical activity?  Yes.  2 times a week she does a mindful movement (stretching) Radhames once a week.  What is your perception of your health?  Good.    Health Maintenance Summary          Overdue - IMM DTaP/Tdap/Td Vaccine (2 - Td or Tdap) Overdue since 1/1/2022 01/01/2012  Imm Admin: Tdap Vaccine          Overdue - Annual Wellness Visit (Every 366 Days) Overdue since 3/11/2022    03/10/2021  Subsequent Annual Wellness Visit - Includes PPPS ()    03/10/2021  Visit Dx: Medicare annual wellness visit, subsequent    02/13/2020  Done    02/13/2020  Initial Annual Wellness Visit - Includes PPPS ()    02/13/2020  Visit Dx: Medicare annual wellness visit, initial          IMM INFLUENZA (1) Next due on 9/1/2022 09/23/2021  Imm Admin: Influenza Vaccine Adult HD    08/27/2020  Imm Admin: Influenza Vaccine Adult HD    10/05/2019  Imm Admin: Influenza Vaccine Adult HD    09/07/2019  Imm Admin: Influenza Vaccine Adult HD    11/01/2018  Imm Admin: Influenza Vaccine Adult HD          MAMMOGRAM (Yearly) Next due on 1/17/2023 01/17/2022  MA-SCREENING MAMMO BILAT W/TOMOSYNTHESIS W/CAD    11/23/2020  MA-DIAGNOSTIC MAMMO BILAT W/TOMOSYNTHESIS W/CAD    03/28/2019  MA-MAMMO DIAGNOSTIC UNILAT W/TOMOSYNTHESIS W/O CAD LEFT    03/18/2019  MA-SCREENING MAMMO BILAT W/TOMOSYNTHESIS W/CAD    10/01/2015  Done    Only the first 5  history entries have been loaded, but more history exists.          BONE DENSITY (Every 5 Years) Next due on 2019  DS-BONE DENSITY STUDY (DEXA)          COLORECTAL CANCER SCREENING (COLONOSCOPY - Every 10 Years) Next due on 2020  OCCULT BLOOD FECES IMMUNOASSAY    2018  REFERRAL TO GI FOR COLONOSCOPY          IMM PNEUMOCOCCAL VACCINE: 65+ Years (Series Information) Completed    2019  Imm Admin: Pneumococcal polysaccharide vaccine (PPSV-23)    2018  Imm Admin: Pneumococcal Conjugate Vaccine (Prevnar/PCV-13)          HEPATITIS C SCREENING  Completed    10/02/2019  HEP C VIRUS ANTIBODY          IMM ZOSTER VACCINES (Series Information) Completed    2020  Imm Admin: Zoster Vaccine Recombinant (RZV) (SHINGRIX)    2020  Imm Admin: Zoster Vaccine Recombinant (RZV) (SHINGRIX)          COVID-19 Vaccine (Series Information) Completed    2022  Imm Admin: MODERNA SARS-COV-2 VACCINE (12+)    10/22/2021  Imm Admin: MODERNA SARS-COV-2 VACCINE (12+)    2021  Imm Admin: MODERNA SARS-COV-2 VACCINE (12+)    2021  Imm Admin: Moderna SARS-CoV-2 Vaccine          IMM HEP B VACCINE (Series Information) Aged Out    No completion history exists for this topic.          IMM MENINGOCOCCAL ACWY VACCINE (Series Information) Aged Out    No completion history exists for this topic.          Discontinued - PAP SMEAR  Discontinued    No completion history exists for this topic.              Patient Care Team:  dAama Rogesr M.D. as PCP - General (Family Medicine)    Social History     Tobacco Use   • Smoking status: Former Smoker     Packs/day: 1.00     Years: 30.00     Pack years: 30.00     Types: Cigarettes     Quit date:      Years since quittin.6   • Smokeless tobacco: Never Used   Vaping Use   • Vaping Use: Never used   Substance Use Topics   • Alcohol use: Yes     Alcohol/week: 0.6 oz     Types: 1 Glasses of wine per week     Comment: 3  "times/week   • Drug use: Not Currently     Types: Cocaine, Marijuana     Comment: Remote, 50 years ago     Family History   Problem Relation Age of Onset   • Alzheimer's Disease Mother 50        early onset   • Cancer Father 50        laryngeal, mets to bladder   • Heart Attack Sister 56   • Diabetes Sister    • Hyperlipidemia Sister    • Depression Sister    • Cancer Maternal Aunt 50        colon   • Colon Cancer Sister 69   • Depression Sister    • No Known Problems Maternal Grandmother    • Diabetes Maternal Grandfather         late onset   • No Known Problems Paternal Grandmother    • No Known Problems Paternal Grandfather    • Hypertension Neg Hx      She  has a past medical history of Anemia, pernicious, Anxiety, Atrophic gastritis, Hyperlipidemia, Hypertension, Osteoarthritis, Prediabetes, and Tendonitis.   Past Surgical History:   Procedure Laterality Date   • CHOLECYSTECTOMY  2014     Exam:   Height 1.651 m (5' 5\") Comment: Pt reported  Weight 108 kg (237 lb) Comment: Pt reported  Body Mass Index 39.44 kg/m²   Hearing good.    Dentition good  Alert, oriented in no acute distress.  Eye contact is good, speech goal directed, affect calm    Labs  Reviewed and discussed  Lab Results   Component Value Date/Time    CHOLSTRLTOT 196 07/20/2022 09:36 AM     (H) 07/20/2022 09:36 AM    HDL 53 07/20/2022 09:36 AM    TRIGLYCERIDE 169 (H) 07/20/2022 09:36 AM       Lab Results   Component Value Date/Time    SODIUM 138 07/20/2022 09:36 AM    POTASSIUM 4.0 07/20/2022 09:36 AM    CHLORIDE 105 07/20/2022 09:36 AM    CO2 22 07/20/2022 09:36 AM    GLUCOSE 89 07/20/2022 09:36 AM    BUN 8 07/20/2022 09:36 AM    CREATININE 0.60 07/20/2022 09:36 AM     Lab Results   Component Value Date/Time    ALKPHOSPHAT 115 (H) 07/20/2022 09:36 AM    ASTSGOT 14 07/20/2022 09:36 AM    ALTSGPT 8 07/20/2022 09:36 AM    TBILIRUBIN 0.4 07/20/2022 09:36 AM      Lab Results   Component Value Date/Time    HBA1C 5.2 02/26/2021 09:27 AM    HBA1C " 5.5 09/02/2020 08:30 AM    HBA1C 5.4 10/02/2019 08:42 AM     No results found for: TSH  No results found for: FREET4  Lab Results   Component Value Date/Time    WBC 5.1 07/20/2022 09:36 AM    RBC 5.57 (H) 07/20/2022 09:36 AM    HEMOGLOBIN 15.7 07/20/2022 09:36 AM    HEMATOCRIT 47.4 (H) 07/20/2022 09:36 AM    MCV 85.1 07/20/2022 09:36 AM    MCH 28.2 07/20/2022 09:36 AM    MCHC 33.1 (L) 07/20/2022 09:36 AM    MPV 9.8 07/20/2022 09:36 AM    NEUTSPOLYS 56.40 07/20/2022 09:36 AM    LYMPHOCYTES 30.10 07/20/2022 09:36 AM    MONOCYTES 9.10 07/20/2022 09:36 AM    EOSINOPHILS 2.80 07/20/2022 09:36 AM    BASOPHILS 1.40 07/20/2022 09:36 AM    HYPOCHROMIA 1+ 10/02/2019 08:42 AM    ANISOCYTOSIS 2+ 02/07/2020 09:32 AM      Assessment and Plan    1. Medicare annual wellness visit, subsequent  Reviewed PMH, PSH, FH, SH, ALL, MEDS, HCM/IMM.   - Subsequent Annual Wellness Visit - Includes PPPS ()    2. Health care maintenance  Per HPI  - Subsequent Annual Wellness Visit - Includes PPPS ()    3. Essential hypertension  - Controlled, continue with current management.  - Subsequent Annual Wellness Visit - Includes PPPS ()  - losartan (COZAAR) 50 MG Tab; Take 1.5 Tablets by mouth every day.  Dispense: 135 Tablet; Refill: 1  - OBESITY COUNSELING (No Charge): Patient identified as having weight management issue.  Appropriate orders and counseling given.  - Comp Metabolic Panel; Future    4. Dyslipidemia  - Controlled, continue with current management.  - Subsequent Annual Wellness Visit - Includes PPPS ()  - atorvastatin (LIPITOR) 10 MG Tab; Take 1 Tablet by mouth every day.  Dispense: 90 Tablet; Refill: 3  - OBESITY COUNSELING (No Charge): Patient identified as having weight management issue.  Appropriate orders and counseling given.  - Comp Metabolic Panel; Future  - Lipid Profile; Future    5. Pernicious anemia  - Controlled, continue with current management/B12 supplement  - Subsequent Annual Wellness Visit -  Includes PPPS ()  - CBC WITH DIFFERENTIAL; Future  - VITAMIN B12; Future  6. Atrophic gastritis without hemorrhage    7. LUCY (generalized anxiety disorder)  - Controlled, continue with current management.  - Subsequent Annual Wellness Visit - Includes PPPS ()  - citalopram (CELEXA) 20 MG Tab; Take 1 Tablet by mouth every day.  Dispense: 90 Tablet; Refill: 1  8. History of panic attacks  - Subsequent Annual Wellness Visit - Includes PPPS ()  - citalopram (CELEXA) 20 MG Tab; Take 1 Tablet by mouth every day.  Dispense: 90 Tablet; Refill: 1  9. Major depression in remission (HCC)  - Subsequent Annual Wellness Visit - Includes PPPS ()  - citalopram (CELEXA) 20 MG Tab; Take 1 Tablet by mouth every day.  Dispense: 90 Tablet; Refill: 1    Services suggested: No services needed at this time  Health Care Screening recommendations as per orders if indicated.  Referrals offered: PT/OT/Nutrition counseling/Behavioral Health/Smoking cessation as per orders if indicated.    Discussion today about general wellness and lifestyle habits:    · Prevent falls and reduce trip hazards; Cautioned about securing or removing rugs.  · Have a working fire alarm and carbon monoxide detector;   · Engage in regular physical activity and social activities     Follow-up: In 6 months, labs

## 2022-08-17 DIAGNOSIS — E78.5 DYSLIPIDEMIA: ICD-10-CM

## 2022-08-17 DIAGNOSIS — F41.1 GAD (GENERALIZED ANXIETY DISORDER): ICD-10-CM

## 2022-08-17 DIAGNOSIS — Z86.59 HISTORY OF PANIC ATTACKS: ICD-10-CM

## 2022-08-17 DIAGNOSIS — F32.5 MAJOR DEPRESSION IN REMISSION (HCC): ICD-10-CM

## 2022-08-17 DIAGNOSIS — I10 ESSENTIAL HYPERTENSION: ICD-10-CM

## 2022-08-17 RX ORDER — ATORVASTATIN CALCIUM 10 MG/1
10 TABLET, FILM COATED ORAL DAILY
Qty: 90 TABLET | Refills: 3 | Status: SHIPPED | OUTPATIENT
Start: 2022-08-17 | End: 2023-04-17 | Stop reason: SDUPTHER

## 2022-08-17 RX ORDER — CITALOPRAM 20 MG/1
20 TABLET ORAL DAILY
Qty: 90 TABLET | Refills: 1 | Status: SHIPPED | OUTPATIENT
Start: 2022-08-17 | End: 2023-04-17 | Stop reason: SDUPTHER

## 2022-08-17 RX ORDER — LOSARTAN POTASSIUM 50 MG/1
75 TABLET ORAL DAILY
Qty: 135 TABLET | Refills: 1 | Status: SHIPPED | OUTPATIENT
Start: 2022-08-17 | End: 2022-10-03

## 2022-09-30 SDOH — ECONOMIC STABILITY: INCOME INSECURITY: HOW HARD IS IT FOR YOU TO PAY FOR THE VERY BASICS LIKE FOOD, HOUSING, MEDICAL CARE, AND HEATING?: NOT HARD AT ALL

## 2022-09-30 SDOH — ECONOMIC STABILITY: FOOD INSECURITY: WITHIN THE PAST 12 MONTHS, THE FOOD YOU BOUGHT JUST DIDN'T LAST AND YOU DIDN'T HAVE MONEY TO GET MORE.: NEVER TRUE

## 2022-09-30 SDOH — ECONOMIC STABILITY: FOOD INSECURITY: WITHIN THE PAST 12 MONTHS, YOU WORRIED THAT YOUR FOOD WOULD RUN OUT BEFORE YOU GOT MONEY TO BUY MORE.: NEVER TRUE

## 2022-09-30 SDOH — HEALTH STABILITY: PHYSICAL HEALTH: ON AVERAGE, HOW MANY MINUTES DO YOU ENGAGE IN EXERCISE AT THIS LEVEL?: 40 MIN

## 2022-09-30 SDOH — HEALTH STABILITY: PHYSICAL HEALTH: ON AVERAGE, HOW MANY DAYS PER WEEK DO YOU ENGAGE IN MODERATE TO STRENUOUS EXERCISE (LIKE A BRISK WALK)?: 2 DAYS

## 2022-09-30 SDOH — ECONOMIC STABILITY: HOUSING INSECURITY: IN THE LAST 12 MONTHS, HOW MANY PLACES HAVE YOU LIVED?: 1

## 2022-09-30 SDOH — ECONOMIC STABILITY: INCOME INSECURITY: IN THE LAST 12 MONTHS, WAS THERE A TIME WHEN YOU WERE NOT ABLE TO PAY THE MORTGAGE OR RENT ON TIME?: NO

## 2022-09-30 SDOH — ECONOMIC STABILITY: TRANSPORTATION INSECURITY
IN THE PAST 12 MONTHS, HAS LACK OF TRANSPORTATION KEPT YOU FROM MEETINGS, WORK, OR FROM GETTING THINGS NEEDED FOR DAILY LIVING?: NO

## 2022-09-30 ASSESSMENT — SOCIAL DETERMINANTS OF HEALTH (SDOH)
HOW OFTEN DO YOU ATTEND CHURCH OR RELIGIOUS SERVICES?: NEVER
HOW OFTEN DO YOU ATTEND CHURCH OR RELIGIOUS SERVICES?: NEVER
HOW MANY DRINKS CONTAINING ALCOHOL DO YOU HAVE ON A TYPICAL DAY WHEN YOU ARE DRINKING: 1 OR 2
HOW OFTEN DO YOU GET TOGETHER WITH FRIENDS OR RELATIVES?: TWICE A WEEK
HOW OFTEN DO YOU HAVE A DRINK CONTAINING ALCOHOL: 2-3 TIMES A WEEK
HOW OFTEN DO YOU GET TOGETHER WITH FRIENDS OR RELATIVES?: TWICE A WEEK
DO YOU BELONG TO ANY CLUBS OR ORGANIZATIONS SUCH AS CHURCH GROUPS UNIONS, FRATERNAL OR ATHLETIC GROUPS, OR SCHOOL GROUPS?: NO
IN A TYPICAL WEEK, HOW MANY TIMES DO YOU TALK ON THE PHONE WITH FAMILY, FRIENDS, OR NEIGHBORS?: MORE THAN THREE TIMES A WEEK
IN A TYPICAL WEEK, HOW MANY TIMES DO YOU TALK ON THE PHONE WITH FAMILY, FRIENDS, OR NEIGHBORS?: MORE THAN THREE TIMES A WEEK
WITHIN THE PAST 12 MONTHS, YOU WORRIED THAT YOUR FOOD WOULD RUN OUT BEFORE YOU GOT THE MONEY TO BUY MORE: NEVER TRUE
DO YOU BELONG TO ANY CLUBS OR ORGANIZATIONS SUCH AS CHURCH GROUPS UNIONS, FRATERNAL OR ATHLETIC GROUPS, OR SCHOOL GROUPS?: NO
HOW OFTEN DO YOU HAVE SIX OR MORE DRINKS ON ONE OCCASION: NEVER
ARE YOU MARRIED, WIDOWED, DIVORCED, SEPARATED, NEVER MARRIED, OR LIVING WITH A PARTNER?: NEVER MARRIED
HOW HARD IS IT FOR YOU TO PAY FOR THE VERY BASICS LIKE FOOD, HOUSING, MEDICAL CARE, AND HEATING?: NOT HARD AT ALL
ARE YOU MARRIED, WIDOWED, DIVORCED, SEPARATED, NEVER MARRIED, OR LIVING WITH A PARTNER?: NEVER MARRIED

## 2022-09-30 ASSESSMENT — LIFESTYLE VARIABLES
SKIP TO QUESTIONS 9-10: 1
HOW OFTEN DO YOU HAVE A DRINK CONTAINING ALCOHOL: 2-3 TIMES A WEEK
HOW MANY STANDARD DRINKS CONTAINING ALCOHOL DO YOU HAVE ON A TYPICAL DAY: 1 OR 2
AUDIT-C TOTAL SCORE: 3
HOW OFTEN DO YOU HAVE SIX OR MORE DRINKS ON ONE OCCASION: NEVER

## 2022-10-03 ENCOUNTER — OFFICE VISIT (OUTPATIENT)
Dept: MEDICAL GROUP | Facility: MEDICAL CENTER | Age: 72
End: 2022-10-03
Payer: MEDICARE

## 2022-10-03 VITALS
OXYGEN SATURATION: 96 % | SYSTOLIC BLOOD PRESSURE: 178 MMHG | WEIGHT: 240.52 LBS | HEART RATE: 80 BPM | BODY MASS INDEX: 40.07 KG/M2 | RESPIRATION RATE: 16 BRPM | DIASTOLIC BLOOD PRESSURE: 102 MMHG | TEMPERATURE: 97.3 F | HEIGHT: 65 IN

## 2022-10-03 DIAGNOSIS — I10 ESSENTIAL HYPERTENSION: ICD-10-CM

## 2022-10-03 DIAGNOSIS — E78.5 DYSLIPIDEMIA: ICD-10-CM

## 2022-10-03 DIAGNOSIS — F32.5 MAJOR DEPRESSION IN REMISSION (HCC): ICD-10-CM

## 2022-10-03 DIAGNOSIS — K29.40 ATROPHIC GASTRITIS WITHOUT HEMORRHAGE: ICD-10-CM

## 2022-10-03 DIAGNOSIS — D51.0 PERNICIOUS ANEMIA: ICD-10-CM

## 2022-10-03 DIAGNOSIS — M17.0 PRIMARY OSTEOARTHRITIS OF BOTH KNEES: ICD-10-CM

## 2022-10-03 DIAGNOSIS — F41.1 GENERALIZED ANXIETY DISORDER: ICD-10-CM

## 2022-10-03 DIAGNOSIS — E66.01 MORBID OBESITY WITH BMI OF 40.0-44.9, ADULT (HCC): ICD-10-CM

## 2022-10-03 DIAGNOSIS — K31.A0 INTESTINAL METAPLASIA OF STOMACH: ICD-10-CM

## 2022-10-03 PROBLEM — Z00.00 HEALTH CARE MAINTENANCE: Status: RESOLVED | Noted: 2019-02-25 | Resolved: 2022-10-03

## 2022-10-03 PROCEDURE — 99214 OFFICE O/P EST MOD 30 MIN: CPT | Performed by: FAMILY MEDICINE

## 2022-10-03 RX ORDER — AMLODIPINE BESYLATE 5 MG/1
5 TABLET ORAL DAILY
Qty: 90 TABLET | Refills: 3 | Status: SHIPPED | OUTPATIENT
Start: 2022-10-03 | End: 2023-04-17

## 2022-10-03 RX ORDER — LOSARTAN POTASSIUM 100 MG/1
100 TABLET ORAL DAILY
Qty: 90 TABLET | Refills: 3 | Status: SHIPPED | OUTPATIENT
Start: 2022-10-03 | End: 2023-07-17

## 2022-10-03 ASSESSMENT — FIBROSIS 4 INDEX: FIB4 SCORE: 0.94

## 2022-10-03 NOTE — PROGRESS NOTES
Chief Complaint   Patient presents with    New Patient     Previous PCP Dr. Griggs-Talia    Hypertension       Subjective:     HPI:   Mini Pham presents today with the following:     Here to establish care and address her hypertension    1. Essential hypertension  HTN - Chronic condition stable. Currently taking all meds as directed.   She is not taking baby aspirin daily.   She is not monitoring BP at home. Needs to get a large cuff  Denies symptoms low BP: light-headed, tunnel-vision, unusual fatigue.   Denies symptoms high BP:pounding headache, visual changes, palpitations, flushed face.   Denies medicine side effects: unusual fatigue, slow heartbeat, foot/leg swelling, cough.  Add amlodipine 5 mg daily.  - losartan (COZAAR) 100 MG Tab; Take 1 Tablet by mouth every day.  Dispense: 90 Tablet; Refill: 3  - amLODIPine (NORVASC) 5 MG Tab; Take 1 Tablet by mouth every day.  Dispense: 90 Tablet; Refill: 3    2. Dyslipidemia  Patient denies chest pain, chest pressure, palpitations or exertional shortness of breath. Patient denies muscle aches or muscle weakness from the atorvastatin medication. Patient is a former smoker. Patient takes no aspirin daily. Patient has no history of myocardial infarction, stroke or PVD.      3. Primary osteoarthritis of both knees  Patient does have significant osteoarthritis of both knees.  She is using a walker.  She has been working on weight loss and has successfully lost 30 pounds over the last few years.  However, she does need to lose more.  She is aware of this.    4. Atrophic gastritis without hemorrhage/Intestinal metaplasia of stomach/Pernicious anemia  Patient had testing which showed atrophic gastritis and intestinal metaplasia.  She has pernicious anemia.  She is taking sublingual B12 and seems to be absorbing this well generally.  She will be having lab testing with her hematologist tomorrow.  She is not on acid reducing medication as GI told her this was not what she  "should do as she has too little acid.  Patient has no current GI symptoms.    5. Morbid obesity with BMI of 40.0-44.9, adult ([facility])  20 pound voluntary weight loss    6. LUCY (generalized anxiety disorder)/Major depression in remission ([facility])  Patient currently has fairly well-controlled anxiety disorder on her regimen of citalopram.  She feels her depression is currently well controlled.  She and her sister are looking forward to a vacation in a month.          Patient Active Problem List    Diagnosis Date Noted    Intestinal metaplasia of stomach 10/03/2022    Morbid obesity with BMI of 40.0-44.9, adult ([facility]) 10/08/2019    Major depression in remission ([facility]) 10/08/2019    LUCY (generalized anxiety disorder) 10/08/2019    Atrophic gastritis without hemorrhage 10/08/2019    Essential hypertension 02/25/2019    Dyslipidemia 02/25/2019    Primary osteoarthritis of both knees 02/25/2019    Pernicious anemia 02/25/2019       Current medicines (including changes today)  Current Outpatient Medications   Medication Sig Dispense Refill    losartan (COZAAR) 100 MG Tab Take 1 Tablet by mouth every day. 90 Tablet 3    amLODIPine (NORVASC) 5 MG Tab Take 1 Tablet by mouth every day. 90 Tablet 3    citalopram (CELEXA) 20 MG Tab Take 1 Tablet by mouth every day. 90 Tablet 1    atorvastatin (LIPITOR) 10 MG Tab Take 1 Tablet by mouth every day. 90 Tablet 3    VITAMIN D PO Take 2,000 Units by mouth.      Cyanocobalamin (VITAMIN B 12 PO) Take 2,500 mcg by mouth. Under tongue       No current facility-administered medications for this visit.       No Known Allergies    ROS: As per HPI       Objective:     BP (!) 178/102 (BP Location: Right arm, Patient Position: Sitting, BP Cuff Size: Large adult)   Pulse 80   Temp 36.3 °C (97.3 °F) (Temporal)   Resp 16   Ht 1.651 m (5' 5\")   Wt 109 kg (240 lb 8.4 oz)   SpO2 96%  Body mass index is 40.02 kg/m².    Physical Exam:  Constitutional: Well-developed and well-nourished. Not diaphoretic. No " distress. Lucid and fluent.  Patient, sister, physician and staff all wearing masks.  Skin: Skin is warm and dry. No rash noted.  Head: Atraumatic without lesions.  Eyes: Conjunctivae and extraocular motions are normal. Pupils are equal, round, and reactive to light. No scleral icterus.   Ears:  External ears unremarkable.   Neck: Supple, trachea midline. No thyromegaly present. No cervical or supraclavicular lymphadenopathy. No JVD or carotid bruits appreciated  Cardiovascular: Regular rate and rhythm.  Normal S1, S2 without murmur appreciated.  Chest: Effort normal. Clear to auscultation throughout. No adventitious sounds.   Abdomen: Soft, non tender, and without distention. Active bowel sounds in all four quadrants. No rebound, guarding, masses or hepatosplenomegaly.  Extremities: No cyanosis, clubbing, erythema, nor edema. Mild crepitus both legs.  Neurological: Alert and oriented x 3. Using her walker.    Psychiatric:  Behavior, mood, and affect are appropriate.       Assessment and Plan:     72 y.o. female with the following issues:    1. Essential hypertension  losartan (COZAAR) 100 MG Tab    amLODIPine (NORVASC) 5 MG Tab      2. Dyslipidemia        3. Primary osteoarthritis of both knees        4. Atrophic gastritis without hemorrhage        5. Intestinal metaplasia of stomach        6. Pernicious anemia        7. Morbid obesity with BMI of 40.0-44.9, adult ([facility])        8. LUCY (generalized anxiety disorder)        9. Major depression in remission ([facility])              Followup: Return in about 6 months (around 4/3/2023), or if symptoms worsen or fail to improve.

## 2022-10-14 ENCOUNTER — TELEPHONE (OUTPATIENT)
Dept: NEUROLOGY | Facility: MEDICAL CENTER | Age: 72
End: 2022-10-14
Payer: MEDICARE

## 2022-10-14 NOTE — TELEPHONE ENCOUNTER
1. Caller Name: Mini Pham                        Call Back Number: [phone]      How would the patient prefer to be contacted with a response: Phone call OK to leave a detailed message    Left Message for patient to schedule an appointment and hour length as new patient per Dr. Acuna. I have attempted to reach the patient 4 times and no response.

## 2022-11-11 ENCOUNTER — PATIENT MESSAGE (OUTPATIENT)
Dept: HEALTH INFORMATION MANAGEMENT | Facility: OTHER | Age: 72
End: 2022-11-11

## 2023-01-31 ENCOUNTER — HOSPITAL ENCOUNTER (OUTPATIENT)
Dept: RADIOLOGY | Facility: MEDICAL CENTER | Age: 73
End: 2023-01-31
Attending: FAMILY MEDICINE
Payer: MEDICARE

## 2023-01-31 DIAGNOSIS — Z12.31 VISIT FOR SCREENING MAMMOGRAM: ICD-10-CM

## 2023-01-31 PROCEDURE — 77063 BREAST TOMOSYNTHESIS BI: CPT

## 2023-04-17 ENCOUNTER — OFFICE VISIT (OUTPATIENT)
Dept: MEDICAL GROUP | Facility: MEDICAL CENTER | Age: 73
End: 2023-04-17
Payer: MEDICARE

## 2023-04-17 VITALS
BODY MASS INDEX: 40.04 KG/M2 | SYSTOLIC BLOOD PRESSURE: 142 MMHG | OXYGEN SATURATION: 97 % | DIASTOLIC BLOOD PRESSURE: 94 MMHG | WEIGHT: 240.3 LBS | TEMPERATURE: 97.9 F | HEIGHT: 65 IN | HEART RATE: 85 BPM

## 2023-04-17 DIAGNOSIS — F32.5 MAJOR DEPRESSION IN REMISSION (HCC): ICD-10-CM

## 2023-04-17 DIAGNOSIS — E78.5 DYSLIPIDEMIA: ICD-10-CM

## 2023-04-17 DIAGNOSIS — F41.1 GAD (GENERALIZED ANXIETY DISORDER): ICD-10-CM

## 2023-04-17 DIAGNOSIS — Z86.59 HISTORY OF PANIC ATTACKS: ICD-10-CM

## 2023-04-17 DIAGNOSIS — I10 ESSENTIAL HYPERTENSION: ICD-10-CM

## 2023-04-17 DIAGNOSIS — E66.9 OBESITY, CLASS II, BMI 35-39.9: ICD-10-CM

## 2023-04-17 PROBLEM — E66.01 MORBID OBESITY WITH BMI OF 40.0-44.9, ADULT (HCC): Status: RESOLVED | Noted: 2019-10-08 | Resolved: 2023-04-17

## 2023-04-17 PROCEDURE — 99214 OFFICE O/P EST MOD 30 MIN: CPT | Performed by: FAMILY MEDICINE

## 2023-04-17 RX ORDER — CITALOPRAM 20 MG/1
20 TABLET ORAL DAILY
Qty: 90 TABLET | Refills: 3 | Status: SHIPPED | OUTPATIENT
Start: 2023-04-17 | End: 2024-03-15 | Stop reason: SDUPTHER

## 2023-04-17 RX ORDER — AMLODIPINE BESYLATE 10 MG/1
10 TABLET ORAL DAILY
Qty: 90 TABLET | Refills: 3 | Status: ON HOLD | OUTPATIENT
Start: 2023-04-17 | End: 2024-02-26

## 2023-04-17 RX ORDER — ATORVASTATIN CALCIUM 10 MG/1
10 TABLET, FILM COATED ORAL DAILY
Qty: 90 TABLET | Refills: 3 | Status: SHIPPED | OUTPATIENT
Start: 2023-04-17 | End: 2024-03-15 | Stop reason: SDUPTHER

## 2023-04-17 RX ORDER — LORAZEPAM 0.5 MG/1
0.5 TABLET ORAL EVERY 8 HOURS PRN
Qty: 20 TABLET | Refills: 0 | Status: SHIPPED | OUTPATIENT
Start: 2023-04-17 | End: 2023-04-24

## 2023-04-17 ASSESSMENT — FIBROSIS 4 INDEX: FIB4 SCORE: 0.95

## 2023-04-17 ASSESSMENT — PATIENT HEALTH QUESTIONNAIRE - PHQ9: CLINICAL INTERPRETATION OF PHQ2 SCORE: 0

## 2023-04-17 NOTE — PROGRESS NOTES
Chief Complaint   Patient presents with    Hypertension Follow-up     6m fv       Subjective:     HPI:   Mini Pham presents today with the followin. Essential hypertension  HTN - Chronic condition stable. Currently taking all meds as directed.   She is not taking baby aspirin daily.   She is not monitoring BP at home. Was scared to take it.  Doing better now.  Blood pressure still a little high.  Amlodipine adjusted.  Denies symptoms low BP: light-headed, tunnel-vision, unusual fatigue.   Denies symptoms high BP:pounding headache, visual changes, palpitations, flushed face.   Denies medicine side effects: unusual fatigue, slow heartbeat, foot/leg swelling, cough.    2. Dyslipidemia  Patient denies chest pain, chest pressure, palpitations or exertional shortness of breath. Patient denies muscle aches or muscle weakness from the atorvastatin medication. Patient is a former smoker.  Quit over 30 years ago.  Patient takes no aspirin daily. Patient has no history of myocardial infarction, stroke or PVD.  Atorvastatin is renewed    3. Major depression in remission ([facility])  Patient feels the citalopram is generally helping control her depression quite well.  She has been more anxious lately as she is her sister's primary caregiver and her sister has been having many medical challenges.    4. LUCY (generalized anxiety disorder)/History of panic attacks  Anxiety has been at times quite severe lately.  Patient used to have lorazepam on hand to use if she needed.  PDMP review shows no inconsistencies.  Discussed use of the medication and in particular using this sparingly as needed only.  Patient voices her understanding.  Patient does use occasional alcohol.  She will not use alcohol with this medication.  Prescription is written.    5. Obesity, Class II, BMI 35-39.9  Patient is working on weight control but has been stress eating.  She is trying to be more active.        Patient Active Problem List    Diagnosis  "Date Noted    Intestinal metaplasia of stomach 10/03/2022    Major depression in remission ([facility]) 10/08/2019    LUCY (generalized anxiety disorder) 10/08/2019    Atrophic gastritis without hemorrhage 10/08/2019    Essential hypertension 02/25/2019    Dyslipidemia 02/25/2019    Primary osteoarthritis of both knees 02/25/2019    Pernicious anemia 02/25/2019       Current medicines (including changes today)  Current Outpatient Medications   Medication Sig Dispense Refill    ferrous sulfate (FEOSOL) 220 (44 Fe) MG/5ML Elixir TAKE 2.5 ML BY MOUTH WITH FOOD EVERY OTHER DAY      amLODIPine (NORVASC) 10 MG Tab Take 1 Tablet by mouth every day. 90 Tablet 3    citalopram (CELEXA) 20 MG Tab Take 1 Tablet by mouth every day. 90 Tablet 3    atorvastatin (LIPITOR) 10 MG Tab Take 1 Tablet by mouth every day. 90 Tablet 3    LORazepam (ATIVAN) 0.5 MG Tab Take 1 Tablet by mouth every 8 hours as needed for Anxiety for up to 7 days. 20 tablets is a 7 day quantity 20 Tablet 0    losartan (COZAAR) 100 MG Tab Take 1 Tablet by mouth every day. 90 Tablet 3    VITAMIN D PO Take 2,000 Units by mouth.      Cyanocobalamin (VITAMIN B 12 PO) Take 2,500 mcg by mouth. Under tongue       No current facility-administered medications for this visit.       No Known Allergies    ROS: As per HPI  denies chest pain or shortness of breath       Objective:     BP (!) 142/94 (BP Location: Right arm, Patient Position: Sitting, BP Cuff Size: Large adult)   Pulse 85   Temp 36.6 °C (97.9 °F) (Temporal)   Ht 1.651 m (5' 5\")   Wt 109 kg (240 lb 4.8 oz)   SpO2 97%  Body mass index is 39.99 kg/m².    Physical Exam:  Constitutional: Well-developed and well-nourished. Not diaphoretic. No distress. Lucid and fluent.  Skin: Skin is warm and dry. No rash noted.  Head: Atraumatic without lesions.  Eyes: Conjunctivae and extraocular motions are normal. Pupils are equal, round, and reactive to light. No scleral icterus.   Ears:  External ears unremarkable. "   Mouth/Throat: Tongue normal. Oropharynx is clear and moist. Posterior pharynx without erythema or exudates.  Neck: Supple, trachea midline. No thyromegaly present. No cervical or supraclavicular lymphadenopathy. No JVD or carotid bruits appreciated  Cardiovascular: Regular rate and rhythm.  Normal S1, S2 without murmur appreciated.  Chest: Effort normal. Clear to auscultation throughout. No adventitious sounds.   Abdomen: Soft, non tender, and without distention. Active bowel sounds in all four quadrants. No rebound, guarding, masses or hepatosplenomegaly.  Extremities: No cyanosis, clubbing, erythema, nor edema.   Neurological: Alert and oriented x 3.  No tremor appreciated.  Movements symmetric.  Psychiatric:  Behavior, mood, and affect are appropriate.       Assessment and Plan:     73 y.o. female with the following issues:    1. Essential hypertension  amLODIPine (NORVASC) 10 MG Tab    Comp Metabolic Panel    Lipid Profile    TSH      2. Dyslipidemia  atorvastatin (LIPITOR) 10 MG Tab    Comp Metabolic Panel    Lipid Profile    TSH      3. Major depression in remission (HCC)  citalopram (CELEXA) 20 MG Tab      4. LUCY (generalized anxiety disorder)  citalopram (CELEXA) 20 MG Tab    LORazepam (ATIVAN) 0.5 MG Tab      5. History of panic attacks  citalopram (CELEXA) 20 MG Tab    LORazepam (ATIVAN) 0.5 MG Tab      6. Obesity, Class II, BMI 35-39.9  Patient identified as having weight management issue.  Appropriate orders and counseling given.            Followup: Return in about 6 months (around 10/17/2023), or if symptoms worsen or fail to improve.

## 2023-05-18 ENCOUNTER — HOSPITAL ENCOUNTER (OUTPATIENT)
Dept: LAB | Facility: MEDICAL CENTER | Age: 73
End: 2023-05-18
Attending: FAMILY MEDICINE
Payer: MEDICARE

## 2023-05-18 DIAGNOSIS — E78.5 DYSLIPIDEMIA: ICD-10-CM

## 2023-05-18 DIAGNOSIS — I10 ESSENTIAL HYPERTENSION: ICD-10-CM

## 2023-05-18 LAB — TSH SERPL DL<=0.005 MIU/L-ACNC: 4.08 UIU/ML (ref 0.38–5.33)

## 2023-05-18 PROCEDURE — 84443 ASSAY THYROID STIM HORMONE: CPT

## 2023-05-18 PROCEDURE — 36415 COLL VENOUS BLD VENIPUNCTURE: CPT

## 2023-05-18 PROCEDURE — 80061 LIPID PANEL: CPT

## 2023-05-18 PROCEDURE — 80053 COMPREHEN METABOLIC PANEL: CPT

## 2023-05-19 LAB
ALBUMIN SERPL BCP-MCNC: 4.3 G/DL (ref 3.2–4.9)
ALBUMIN/GLOB SERPL: 1.5 G/DL
ALP SERPL-CCNC: 129 U/L (ref 30–99)
ALT SERPL-CCNC: 10 U/L (ref 2–50)
ANION GAP SERPL CALC-SCNC: 10 MMOL/L (ref 7–16)
AST SERPL-CCNC: 17 U/L (ref 12–45)
BILIRUB SERPL-MCNC: 0.4 MG/DL (ref 0.1–1.5)
BUN SERPL-MCNC: 10 MG/DL (ref 8–22)
CALCIUM ALBUM COR SERPL-MCNC: 8.8 MG/DL (ref 8.5–10.5)
CALCIUM SERPL-MCNC: 9 MG/DL (ref 8.5–10.5)
CHLORIDE SERPL-SCNC: 108 MMOL/L (ref 96–112)
CHOLEST SERPL-MCNC: 175 MG/DL (ref 100–199)
CO2 SERPL-SCNC: 25 MMOL/L (ref 20–33)
CREAT SERPL-MCNC: 0.57 MG/DL (ref 0.5–1.4)
FASTING STATUS PATIENT QL REPORTED: NORMAL
GFR SERPLBLD CREATININE-BSD FMLA CKD-EPI: 97 ML/MIN/1.73 M 2
GLOBULIN SER CALC-MCNC: 2.8 G/DL (ref 1.9–3.5)
GLUCOSE SERPL-MCNC: 83 MG/DL (ref 65–99)
HDLC SERPL-MCNC: 56 MG/DL
LDLC SERPL CALC-MCNC: 89 MG/DL
POTASSIUM SERPL-SCNC: 4.2 MMOL/L (ref 3.6–5.5)
PROT SERPL-MCNC: 7.1 G/DL (ref 6–8.2)
SODIUM SERPL-SCNC: 143 MMOL/L (ref 135–145)
TRIGL SERPL-MCNC: 148 MG/DL (ref 0–149)

## 2023-07-16 DIAGNOSIS — I10 ESSENTIAL HYPERTENSION: ICD-10-CM

## 2023-07-17 RX ORDER — LOSARTAN POTASSIUM 100 MG/1
TABLET ORAL
Qty: 90 TABLET | Refills: 3 | Status: ON HOLD | OUTPATIENT
Start: 2023-07-17 | End: 2024-02-26

## 2023-10-18 ENCOUNTER — OFFICE VISIT (OUTPATIENT)
Dept: MEDICAL GROUP | Facility: MEDICAL CENTER | Age: 73
End: 2023-10-18
Payer: MEDICARE

## 2023-10-18 VITALS
SYSTOLIC BLOOD PRESSURE: 132 MMHG | DIASTOLIC BLOOD PRESSURE: 82 MMHG | HEIGHT: 65 IN | RESPIRATION RATE: 18 BRPM | OXYGEN SATURATION: 96 % | TEMPERATURE: 98 F | BODY MASS INDEX: 38.05 KG/M2 | WEIGHT: 228.4 LBS | HEART RATE: 84 BPM

## 2023-10-18 DIAGNOSIS — Z86.2 HISTORY OF IRON DEFICIENCY ANEMIA: ICD-10-CM

## 2023-10-18 DIAGNOSIS — F32.5 MAJOR DEPRESSION IN REMISSION (HCC): ICD-10-CM

## 2023-10-18 DIAGNOSIS — M17.0 PRIMARY OSTEOARTHRITIS OF BOTH KNEES: ICD-10-CM

## 2023-10-18 DIAGNOSIS — E66.9 OBESITY, CLASS II, BMI 35-39.9: ICD-10-CM

## 2023-10-18 DIAGNOSIS — F41.1 GENERALIZED ANXIETY DISORDER: ICD-10-CM

## 2023-10-18 DIAGNOSIS — K31.A0 INTESTINAL METAPLASIA OF STOMACH: ICD-10-CM

## 2023-10-18 DIAGNOSIS — D51.0 PERNICIOUS ANEMIA: ICD-10-CM

## 2023-10-18 DIAGNOSIS — Z00.00 MEDICARE ANNUAL WELLNESS VISIT, SUBSEQUENT: ICD-10-CM

## 2023-10-18 DIAGNOSIS — E78.5 DYSLIPIDEMIA: ICD-10-CM

## 2023-10-18 DIAGNOSIS — I10 ESSENTIAL HYPERTENSION: ICD-10-CM

## 2023-10-18 DIAGNOSIS — K29.40 ATROPHIC GASTRITIS WITHOUT HEMORRHAGE: ICD-10-CM

## 2023-10-18 PROBLEM — E66.812 OBESITY, CLASS II, BMI 35-39.9: Status: ACTIVE | Noted: 2023-10-18

## 2023-10-18 PROCEDURE — G0439 PPPS, SUBSEQ VISIT: HCPCS | Performed by: FAMILY MEDICINE

## 2023-10-18 PROCEDURE — 3075F SYST BP GE 130 - 139MM HG: CPT | Performed by: FAMILY MEDICINE

## 2023-10-18 PROCEDURE — 3079F DIAST BP 80-89 MM HG: CPT | Performed by: FAMILY MEDICINE

## 2023-10-18 ASSESSMENT — ACTIVITIES OF DAILY LIVING (ADL): BATHING_REQUIRES_ASSISTANCE: 0

## 2023-10-18 ASSESSMENT — ENCOUNTER SYMPTOMS: GENERAL WELL-BEING: GOOD

## 2023-10-18 ASSESSMENT — PATIENT HEALTH QUESTIONNAIRE - PHQ9: CLINICAL INTERPRETATION OF PHQ2 SCORE: 0

## 2023-10-18 ASSESSMENT — FIBROSIS 4 INDEX: FIB4 SCORE: 1.03

## 2023-10-18 NOTE — PROGRESS NOTES
Chief Complaint   Patient presents with    Medicare Annual Wellness       HPI:  Mini Pham is a 73 y.o. here for Medicare Annual Wellness Visit     Patient Active Problem List    Diagnosis Date Noted    History of iron deficiency anemia 10/18/2023    Obesity, Class II, BMI 35-39.9 10/18/2023    Intestinal metaplasia of stomach 10/03/2022    Major depression in remission (HCC) 10/08/2019    LUCY (generalized anxiety disorder) 10/08/2019    Atrophic gastritis without hemorrhage 10/08/2019    Essential hypertension 02/25/2019    Dyslipidemia 02/25/2019    Primary osteoarthritis of both knees 02/25/2019    Pernicious anemia 02/25/2019       Current Outpatient Medications   Medication Sig Dispense Refill    losartan (COZAAR) 100 MG Tab TAKE 1 TABLET DAILY (DOSE  INCREASE) 90 Tablet 3    amLODIPine (NORVASC) 10 MG Tab Take 1 Tablet by mouth every day. 90 Tablet 3    citalopram (CELEXA) 20 MG Tab Take 1 Tablet by mouth every day. 90 Tablet 3    atorvastatin (LIPITOR) 10 MG Tab Take 1 Tablet by mouth every day. 90 Tablet 3    VITAMIN D PO Take 2,000 Units by mouth.      Cyanocobalamin (VITAMIN B 12 PO) Take 2,500 mcg by mouth. Under tongue       No current facility-administered medications for this visit.          Current supplements as per medication list.     Allergies: Patient has no known allergies.    Current social contact/activities: She is active with friends and family.  She helps her sister a great deal    She  reports that she quit smoking about 31 years ago. Her smoking use included cigarettes. She started smoking about 61 years ago. She has a 30.0 pack-year smoking history. She has never used smokeless tobacco. She reports current alcohol use of about 0.6 oz of alcohol per week. She reports that she does not currently use drugs after having used the following drugs: Cocaine and Marijuana.  Counseling given: Not Answered      ROS:    Gait: Uses no assistive device  Ostomy: No  Other tubes: No  Amputations:  No  Chronic oxygen use: No  Last eye exam: pre pandemic, will make appointment  Wears hearing aids: No   : Denies any urinary leakage during the last 6 months    Screening/anticipatory guidance:  She drives, seatbelt worn.  Colonoscopy due 2028, will be  due for EGD next year.  Follows with GI.  Yearly preventive screening examination recommended.    Depression Screening  Little interest or pleasure in doing things?  0 - not at all  Feeling down, depressed , or hopeless? 0 - not at all  Trouble falling or staying asleep, or sleeping too much?     Feeling tired or having little energy?     Poor appetite or overeating?     Feeling bad about yourself - or that you are a failure or have let yourself or your family down?    Trouble concentrating on things, such as reading the newspaper or watching television?    Moving or speaking so slowly that other people could have noticed.  Or the opposite - being so fidgety or restless that you have been moving around a lot more than usual?     Thoughts that you would be better off dead, or of hurting yourself?     Patient Health Questionnaire Score:      If depressive symptoms identified deferred to follow up visit unless specifically addressed in assessment and plan.    Interpretation of PHQ-9 Total Score   Score Severity   1-4 No Depression   5-9 Mild Depression   10-14 Moderate Depression   15-19 Moderately Severe Depression   20-27 Severe Depression    Screening for Cognitive Impairment  Do you or any of your friends or family members have any concern about your memory? No  Three Minute Recall (Banana, Sunrise, Chair) 3/3    Boris clock face with all 12 numbers and set the hands to show 20 past 8.  Yes 5/5  Cognitive concerns identified deferred for follow up unless specifically addressed in assessment and plan.    Fall Risk Assessment  Has the patient had two or more falls in the last year or any fall with injury in the last year?  No    Safety Assessment  Do you always wear  your seatbelt?  Yes  Any changes to home needed to function safely? No  Difficulty hearing.  No  Patient counseled about all safety risks that were identified.    Functional Assessment ADLs  Are there any barriers preventing you from cooking for yourself or meeting nutritional needs?  No.    Are there any barriers preventing you from driving safely or obtaining transportation?  No.    Are there any barriers preventing you from using a telephone or calling for help?  No    Are there any barriers preventing you from shopping?  No.    Are there any barriers preventing you from taking care of your own finances?  No    Are there any barriers preventing you from managing your medications?  No    Are there any barriers preventing you from showering, bathing or dressing yourself? No    Are there any barriers preventing you from doing housework or laundry? No  Are there any barriers preventing you from using the toilet?No  Are you currently engaging in any exercise or physical activity?  Yes. Attends mindfulness movement    Self-Assessment of Health  What is your perception of your health? Good  Do you sleep more than six hours a night? Yes  In the past 7 days, how much did pain keep you from doing your normal work? Some  Do you spend quality time with family or friends (virtually or in person)? Yes  Do you usually eat a heart healthy diet that constists of a variety of fruits, vegetables, whole grains and fiber? Yes  Do you eat foods high in fat and/or Fast Food more than three times per week? No    Advance Care Planning  Do you have an Advance Directive, Living Will, Durable Power of , or POLST? No                 Health Maintenance Summary            Postponed - Bone Density Scan (Every 2 Years) Postponed until 1/1/2024 09/12/2019  DS-BONE DENSITY STUDY (DEXA)              Mammogram (Yearly) Next due on 1/31/2024 01/31/2023  MA-SCREENING MAMMO BILAT W/TOMOSYNTHESIS W/CAD    01/17/2022  MA-SCREENING  MAMMO BILAT W/TOMOSYNTHESIS W/CAD    11/23/2020  MA-DIAGNOSTIC MAMMO BILAT W/TOMOSYNTHESIS W/CAD    03/28/2019  MA-MAMMO DIAGNOSTIC UNILAT W/TOMOSYNTHESIS W/O CAD LEFT    03/18/2019  MA-SCREENING MAMMO BILAT W/TOMOSYNTHESIS W/CAD    Only the first 5 history entries have been loaded, but more history exists.              Annual Wellness Visit (Every 366 Days) Next due on 10/18/2024      10/18/2023  Visit Dx: Medicare annual wellness visit, subsequent    08/01/2022  Visit Dx: Medicare annual wellness visit, subsequent    08/01/2022  Subsequent Annual Wellness Visit - Includes PPPS ()    03/10/2021  Subsequent Annual Wellness Visit - Includes PPPS ()    03/10/2021  Visit Dx: Medicare annual wellness visit, subsequent    Only the first 5 history entries have been loaded, but more history exists.              Colorectal Cancer Screening (Colonoscopy - Every 10 Years) Tentatively due on 9/13/2028 02/05/2020  OCCULT BLOOD FECES IMMUNOASSAY    09/13/2018  REFERRAL TO GI FOR COLONOSCOPY              IMM DTaP/Tdap/Td Vaccine (3 - Td or Tdap) Next due on 10/15/2032      10/15/2022  Imm Admin: Tdap Vaccine    01/01/2012  Imm Admin: Tdap Vaccine              Pneumococcal Vaccine: 65+ Years (Series Information) Completed      01/17/2019  Imm Admin: Pneumococcal polysaccharide vaccine (PPSV-23)    01/17/2018  Imm Admin: Pneumococcal Conjugate Vaccine (Prevnar/PCV-13)              Hepatitis C Screening  Tentatively Complete      10/02/2019  Hepatitis C Antibody component of HEP C VIRUS ANTIBODY              Zoster (Shingles) Vaccines (Series Information) Completed      11/23/2020  Imm Admin: Zoster Vaccine Recombinant (RZV) (SHINGRIX)    08/27/2020  Imm Admin: Zoster Vaccine Recombinant (RZV) (SHINGRIX)              Influenza Vaccine (Series Information) Completed      09/16/2023  Imm Admin: Influenza Vaccine, Quadrivalent, Adjuvanted (Pf)    09/14/2022  Imm Admin: Influenza Vaccine Adult HD    09/23/2021  Imm Admin:  Influenza Vaccine Adult HD    2020  Imm Admin: Influenza Vaccine Adult HD    10/05/2019  Imm Admin: Influenza Vaccine Adult HD    Only the first 5 history entries have been loaded, but more history exists.              COVID-19 Vaccine (Series Information) Completed      2023  Imm Admin: Comirnaty (Covid-19 Vaccine, Mrna, [id number] Formula)    2023  Imm Admin: MODERNA BIVALENT BOOSTER SARS-COV-2 VACCINE (6+)    2022  Imm Admin: MODERNA BIVALENT BOOSTER SARS-COV-2 VACCINE (6+)    2022  Imm Admin: MODERNA SARS-COV-2 VACCINE (12+)    10/22/2021  Imm Admin: MODERNA SARS-COV-2 VACCINE (12+)    Only the first 5 history entries have been loaded, but more history exists.              Hepatitis A Vaccine (Hep A) (Series Information) Aged Out      No completion history exists for this topic.              HPV Vaccines (Series Information) Aged Out      No completion history exists for this topic.              Polio Vaccine (Inactivated Polio) (Series Information) Aged Out      No completion history exists for this topic.              Meningococcal Immunization (Series Information) Aged Out      No completion history exists for this topic.              Discontinued - Hepatitis B Vaccine (Hep B)  Discontinued      No completion history exists for this topic.                    Patient Care Team:  Max Gonzales M.D. as PCP - General (Family Medicine)  Dario Curtis M.D. (Gastroenterology)  Reinaldo Leonard M.D. (Medical Oncology)      Social History     Tobacco Use    Smoking status: Former     Current packs/day: 0.00     Average packs/day: 1 pack/day for 30.0 years (30.0 ttl pk-yrs)     Types: Cigarettes     Start date:      Quit date:      Years since quittin.8    Smokeless tobacco: Never   Vaping Use    Vaping Use: Never used   Substance Use Topics    Alcohol use: Yes     Alcohol/week: 0.6 oz     Types: 1 Glasses of wine per week     Comment: 3 times/week    Drug use: Not  "Currently     Types: Cocaine, Marijuana     Comment: Remote, 50 years ago     Family History   Problem Relation Age of Onset    Alzheimer's Disease Mother 50        early onset    Cancer Father 50        laryngeal, mets to bladder    Heart Attack Sister 56    Diabetes Sister     Hyperlipidemia Sister     Depression Sister     Colon Cancer Sister 69    Depression Sister     Cancer Maternal Aunt 50        colon    No Known Problems Maternal Grandmother     Diabetes Maternal Grandfather         late onset    No Known Problems Paternal Grandmother     No Known Problems Paternal Grandfather     Hypertension Neg Hx      She  has a past medical history of Anemia, pernicious, Anxiety, Atrophic gastritis, History of iron deficiency anemia (10/18/2023), Hyperlipidemia, Hypertension, Intestinal metaplasia of stomach (10/3/2022), Osteoarthritis, Prediabetes, and Tendonitis.   Past Surgical History:   Procedure Laterality Date    CHOLECYSTECTOMY  2014       Exam:   /82   Pulse 84   Temp 36.7 °C (98 °F)   Resp 18   Ht 1.651 m (5' 5\")   Wt 104 kg (228 lb 6.4 oz)   SpO2 96%  Body mass index is 38.01 kg/m².    Hearing good.    Dentition good  Alert, oriented in no acute distress.  Eye contact is good, speech goal directed, affect calm  HEENT:   EOMI, PERRLA.    Neck:       Full range of motion. No JVD or carotid bruits appreciated. No cervical adenopathy appreciated. No thyromegaly or neck masses appreciated.  No retractions appreciated.  Lungs:     Clear to auscultation A&P with good air movement.   Heart:      Regular rate and rhythm normal S1 and S2 without murmur appreciated.  Strong and symmetric radial and DP pulses.  Abd:        Soft, bowel sounds positive, nontender. No bloating noted. No hepatosplenomegaly or mass appreciated. No pulsatile mass appreciated.  Ext:         Extremities show symmetric and full range of motion with normal strength. No cyanosis or clubbing appreciated. No peripheral edema " appreciated.  Neuro:    Gait is normal. Patient is lucid, fluent and appropriate. No significant tremor appreciated.  Skin:       Exhibit no rashes, pigmented lesions or ulcerations.    Consult note and labs from Dr. Leonard reviewed and discussed      Assessment and Plan. The following treatment and monitoring plan is recommended:      1. Medicare annual wellness visit, subsequent  Her medical problems are generally stable    2. Dyslipidemia  Patient denies chest pain, chest pressure, palpitations or exertional shortness of breath. Patient denies muscle aches or muscle weakness from the atorvastatin medication. Patient is a former smoker. Patient takes no aspirin daily. Patient has no history of myocardial infarction, stroke or PVD.  Follow-up lab orders discussed and placed.  The problem is clinically stable.  - Comp Metabolic Panel; Future  - TSH; Future  - Lipid Profile; Future    3. Essential hypertension  HTN - Chronic condition stable. Currently taking all meds as directed.   She is not taking baby aspirin daily.   She is not monitoring BP at home.  Blood pressure here today is relatively good.  Denies symptoms low BP: light-headed, tunnel-vision, unusual fatigue.   Denies symptoms high BP:pounding headache, visual changes, palpitations, flushed face.   Denies medicine side effects: unusual fatigue, slow heartbeat, foot/leg swelling, cough.  - Comp Metabolic Panel; Future  - CBC WITHOUT DIFFERENTIAL; Future  - Lipid Profile; Future    4. Pernicious anemia/Atrophic gastritis without hemorrhage/Intestinal metaplasia of stomach  Patient has pernicious anemia due to nonproduction because atrophic gastritis and intestinal metaplasia.  She continues to take her B12 supplementation.  She is supposed to have follow-up endoscopy with GI roughly every 3 years.  I believe she will be due next year.  She takes oral B12, this has been successful in the past.  Follow-up lab orders discussed and placed.  Stable problem.  -  CBC WITHOUT DIFFERENTIAL; Future  - VITAMIN B12; Future    5. History of iron deficiency anemia  Patient's most recent CBC no longer showed anemia.  This was done at her hematologist office.  This was just within the last month.  Follow-up lab orders discussed and placed to do in 6 months.  She was told by hematology that she no longer needs to take the supplemental iron.  Stable problem.  - CBC WITHOUT DIFFERENTIAL; Future  - IRON/TOTAL IRON BIND; Future    6. Primary osteoarthritis of both knees  Patient has chronic knee pain from degenerative arthritic change.  Injections in the knees helped but only for a couple months.  She has been offered knee surgery but will need to lose over 20 pounds first.  She is working hard on that.  She uses Tylenol as needed and topical treatments for the pain.  Stable problem.    7. Generalized anxiety disorder  Patient has longstanding generalized anxiety disorder.  The citalopram does help this but she still does get anxiety from time to time.  When the anxiety is very severe she takes lorazepam.  She currently has half a bottle left.  PDMP review shows no inconsistencies.  She does not need a renewal today now when she does.  6-month follow-up recommended.  Patient does occasionally use alcohol but knows not to mix lorazepam with this.  Stable problem.    8. Major depression in remission (HCC)  She feels that with exercise and the citalopram she is no longer having depressive symptoms.  Denies depression today.  Stable problem.    9. Obesity, Class II, BMI 35-39.9  Patient eats an extremely good diet, mostly vegetarian.  She has lost 8 pounds since June.  She is congratulated.  She feels she has plateaued.  She will increase her exercise.  Stable problem.      Services suggested: No services needed at this time  Health Care Screening: Age-appropriate preventive services recommended by USPTF and ACIP covered by Medicare were discussed today. Services ordered if indicated and  agreed upon by the patient.  Referrals offered: Community-based lifestyle interventions to reduce health risks and promote self-management and wellness, fall prevention, nutrition, physical activity, tobacco-use cessation, weight loss, and mental health services as per orders if indicated.    Discussion today about general wellness and lifestyle habits: discussed   Prevent falls and reduce trip hazards; Cautioned about securing or removing rugs.  Have a working fire alarm and carbon monoxide detector; has  Engage in regular physical activity and social activities     Follow-up: Return in about 6 months (around 4/18/2024), or if symptoms worsen or fail to improve.

## 2024-01-18 PROBLEM — M17.12 DEGENERATIVE ARTHRITIS OF LEFT KNEE: Status: ACTIVE | Noted: 2024-01-18

## 2024-01-31 ENCOUNTER — APPOINTMENT (OUTPATIENT)
Dept: ADMISSIONS | Facility: MEDICAL CENTER | Age: 74
End: 2024-01-31
Attending: ORTHOPAEDIC SURGERY
Payer: MEDICARE

## 2024-02-02 ENCOUNTER — HOSPITAL ENCOUNTER (OUTPATIENT)
Dept: RADIOLOGY | Facility: MEDICAL CENTER | Age: 74
End: 2024-02-02
Attending: FAMILY MEDICINE
Payer: MEDICARE

## 2024-02-02 DIAGNOSIS — Z12.31 VISIT FOR SCREENING MAMMOGRAM: ICD-10-CM

## 2024-02-02 PROCEDURE — 77067 SCR MAMMO BI INCL CAD: CPT

## 2024-02-05 ENCOUNTER — HOSPITAL ENCOUNTER (OUTPATIENT)
Dept: RADIOLOGY | Facility: MEDICAL CENTER | Age: 74
End: 2024-02-05
Attending: ORTHOPAEDIC SURGERY
Payer: MEDICARE

## 2024-02-05 DIAGNOSIS — M17.2 POST-TRAUMATIC OSTEOARTHRITIS OF BOTH KNEES: ICD-10-CM

## 2024-02-05 PROCEDURE — 73700 CT LOWER EXTREMITY W/O DYE: CPT | Mod: LT

## 2024-02-06 ENCOUNTER — PRE-ADMISSION TESTING (OUTPATIENT)
Dept: ADMISSIONS | Facility: MEDICAL CENTER | Age: 74
End: 2024-02-06
Attending: ORTHOPAEDIC SURGERY
Payer: MEDICARE

## 2024-02-06 VITALS — BODY MASS INDEX: 38.62 KG/M2 | HEIGHT: 64 IN

## 2024-02-12 ENCOUNTER — APPOINTMENT (OUTPATIENT)
Dept: ADMISSIONS | Facility: MEDICAL CENTER | Age: 74
End: 2024-02-12
Attending: ORTHOPAEDIC SURGERY
Payer: MEDICARE

## 2024-02-12 DIAGNOSIS — Z01.810 PRE-OPERATIVE CARDIOVASCULAR EXAMINATION: ICD-10-CM

## 2024-02-12 DIAGNOSIS — M17.0 PRIMARY OSTEOARTHRITIS OF BOTH KNEES: ICD-10-CM

## 2024-02-12 DIAGNOSIS — Z01.812 PRE-OPERATIVE LABORATORY EXAMINATION: ICD-10-CM

## 2024-02-12 LAB
ANION GAP SERPL CALC-SCNC: 12 MMOL/L (ref 7–16)
BUN SERPL-MCNC: 7 MG/DL (ref 8–22)
CALCIUM SERPL-MCNC: 8.9 MG/DL (ref 8.4–10.2)
CHLORIDE SERPL-SCNC: 104 MMOL/L (ref 96–112)
CO2 SERPL-SCNC: 23 MMOL/L (ref 20–33)
CREAT SERPL-MCNC: 0.56 MG/DL (ref 0.5–1.4)
EKG IMPRESSION: NORMAL
ERYTHROCYTE [DISTWIDTH] IN BLOOD BY AUTOMATED COUNT: 40.7 FL (ref 35.9–50)
GFR SERPLBLD CREATININE-BSD FMLA CKD-EPI: 97 ML/MIN/1.73 M 2
GLUCOSE SERPL-MCNC: 97 MG/DL (ref 65–99)
HCT VFR BLD AUTO: 46.8 % (ref 37–47)
HGB BLD-MCNC: 15.3 G/DL (ref 12–16)
MCH RBC QN AUTO: 27.6 PG (ref 27–33)
MCHC RBC AUTO-ENTMCNC: 32.7 G/DL (ref 32.2–35.5)
MCV RBC AUTO: 84.5 FL (ref 81.4–97.8)
PLATELET # BLD AUTO: 363 K/UL (ref 164–446)
PMV BLD AUTO: 9.7 FL (ref 9–12.9)
POTASSIUM SERPL-SCNC: 4.1 MMOL/L (ref 3.6–5.5)
RBC # BLD AUTO: 5.54 M/UL (ref 4.2–5.4)
SCCMEC + MECA PNL NOSE NAA+PROBE: NEGATIVE
SCCMEC + MECA PNL NOSE NAA+PROBE: NEGATIVE
SODIUM SERPL-SCNC: 139 MMOL/L (ref 135–145)
WBC # BLD AUTO: 5 K/UL (ref 4.8–10.8)

## 2024-02-12 PROCEDURE — 87641 MR-STAPH DNA AMP PROBE: CPT

## 2024-02-12 PROCEDURE — 85027 COMPLETE CBC AUTOMATED: CPT

## 2024-02-12 PROCEDURE — 80048 BASIC METABOLIC PNL TOTAL CA: CPT

## 2024-02-12 PROCEDURE — 93010 ELECTROCARDIOGRAM REPORT: CPT | Performed by: INTERNAL MEDICINE

## 2024-02-12 PROCEDURE — 36415 COLL VENOUS BLD VENIPUNCTURE: CPT

## 2024-02-12 PROCEDURE — 87640 STAPH A DNA AMP PROBE: CPT | Mod: XU

## 2024-02-12 PROCEDURE — 93005 ELECTROCARDIOGRAM TRACING: CPT

## 2024-02-12 NOTE — DISCHARGE PLANNING
DISCHARGE PLANNING NOTE - TOTAL JOINT    Procedure: Procedure(s):  LEFT TOTAL KNEE ARTHROPLASTY  Procedure Date: 2/26/2024  Insurance: Payor: MEDICARE / Plan: MEDICARE PART A & B / Product Type: *No Product type* /    Equipment currently available at home?  raised toilet seat, shower chair, and ice machine.  Pt also has a cane. Pt will need fww.  Steps into the home? 1  Steps within the home? 0  Toilet height? Standard  Type of shower? walk-in shower  Home Oxygen? No  Portable tank?    Oxygen Provider:  Who will be with you during your recovery? Friend, Candis  Is Outpatient Physical Therapy set up after surgery? Yes  Did you take the Total Joint Class and where? Yes, received NAON book.  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    This writer met with pt and educated to preop showers, nasal mrsa swab which was obtained by this writer, and potential for overnight stay. CHG kit given to pt. This writer gave pt an AD packet. Home safety checklist and equipment resource guide sent home with pt. Pt educated to dc criteria. All questions answered and verbalizes understanding of all instructions. No dc needs identified at this time. Anticipate dc to home without barriers.

## 2024-02-19 ENCOUNTER — PHYSICAL THERAPY (OUTPATIENT)
Dept: PHYSICAL THERAPY | Facility: MEDICAL CENTER | Age: 74
End: 2024-02-19
Attending: ORTHOPAEDIC SURGERY
Payer: MEDICARE

## 2024-02-19 DIAGNOSIS — M17.12 PRIMARY OSTEOARTHRITIS OF LEFT KNEE: ICD-10-CM

## 2024-02-19 PROCEDURE — 97110 THERAPEUTIC EXERCISES: CPT

## 2024-02-19 PROCEDURE — 97161 PT EVAL LOW COMPLEX 20 MIN: CPT

## 2024-02-19 ASSESSMENT — ENCOUNTER SYMPTOMS
QUALITY: SHARP
EXACERBATED BY: WALKING
ALLEVIATING FACTORS: POSITION CHANGE
ALLEVIATING FACTORS: REST
PAIN SCALE AT LOWEST: 0
PAIN SCALE AT HIGHEST: 8
PAIN SCALE: 0
PAIN TIMING: WHEN ACTIVE

## 2024-02-19 NOTE — OP THERAPY EVALUATION
Outpatient Physical Therapy  INITIAL EVALUATION    [facility]  [address]  [place] [address]  Phone:  [phone]  Fax:  [phone]    Date of Evaluation: 2024    Patient: Mini Pham  YOB: 1950  MRN: [id number]     Referring Provider: Chantelle Benton M.D.  [address] [place] [address]   Referring Diagnosis Unilateral primary osteoarthritis, left knee [M17.12]     Time Calculation                 Chief Complaint: Knee Problem    Visit Diagnoses     ICD-10-CM   1. Primary osteoarthritis of left knee  M17.12       Date of onset of impairment: No data found    Subjective:   History of Present Illness:     Date of onset:  2022    Mechanism of injury:  Pt reports she has had knee pain for extended periods. She has never had PT to address knee pain, but does report corticosteroid injections over the last several knees with some relief. She reports at this time that her knee is so bad that if she walks for any extended period she will have L knee pain for 3 days after.   Sleep disturbance:  Interrupted sleep  Pain:     Current pain ratin    At best pain ratin    At worst pain ratin    Quality:  Sharp    Pain timing:  When active    Relieving factors:  Rest and position change    Aggravating factors:  Walking    Progression:  Worsening  Social Support:     Lives in:  One-story house and community-based residential facility  Diagnostic Tests:     X-ray: abnormal    Patient Goals:     Patient goals for therapy:  Decreased pain, increased motion, increased strength and return to sport/leisure activities      Past Medical History:   Diagnosis Date    Acute nasopharyngitis 2023    Anemia, pernicious     Anxiety     Arrhythmia     PVC in past    Arthritis 2005    Atrophic gastritis     Bronchitis     High cholesterol 2000    History of iron deficiency anemia 10/18/2023    Hyperlipidemia      Hypertension     Infectious disease     Intestinal metaplasia of stomach 10/03/2022    Osteoarthritis     Pain 2005    Arthritis pain    Prediabetes     fasting glucose 102    Pregnant 1973    Termination    Tendonitis     chronic, elbows and wrists     Past Surgical History:   Procedure Laterality Date    CHOLECYSTECTOMY       Social History     Tobacco Use    Smoking status: Former     Current packs/day: 0.00     Average packs/day: 1 pack/day for 30.0 years (30.0 ttl pk-yrs)     Types: Cigarettes     Start date:      Quit date:      Years since quittin.1    Smokeless tobacco: Never   Substance Use Topics    Alcohol use: Yes     Alcohol/week: 0.6 oz     Types: 1 Glasses of wine per week     Comment: 3 times/week     Family and Occupational History     Socioeconomic History    Marital status: Single     Spouse name: Not on file    Number of children: Not on file    Years of education: Not on file    Highest education level: Associate degree: occupational, technical, or vocational program   Occupational History    Occupation: retired     Comment:  in [place]       Objective     Active Range of Motion   Left Knee   Flexion: 92 degrees   Extension: -8 degrees     Right Knee   Flexion: 120 degrees   Extension: -4 degrees         Therapeutic Exercises (CPT 20135):     1. Sit to stand, x10, HEP    2. Quad sets, x10, HEP    3. AAROM heel slide, x5, HEP    4. AROM heel slide, x10, HEP    5. LAQ, x10, HEP    6. sidelying hip abduction, x10, HEP      Therapeutic Exercise Summary: Pt was educated today regarding current condition, expectations for rehab potential and appropriate exercise program for home. HEP was given to patient in print out form and instructions were communicated to pt.         Time-based treatments/modalities:           Assessment, Response and Plan:   Impairments: hypersensitivity, impaired physical strength and lacks appropriate home exercise program     Assessment details:  Pt is a 75 y/o F presenting to PT with signs and symptoms consistent with L osteoarthritis and presents for pre-operative care for L TKA to be performed on 2/26/24. Pt has deficits of pain, limited ROM, and weight bearing intolerance contributing to decreased tolerance for walking. Given pt's age, overall health, current condition and adherence to PT recommendations, anticipated duration of episode is 8 weeks.      Prognosis: fair    Goals:   Short Term Goals:   1.Pt will complete updated WOMAC following surgery  Short term goal time span:  1-2 weeks  Patient progress towards short term goals:  1. Pt will have re-assessment completed follow L TKA to set appropriate goals  Long term goal time span:  2-4 weeks    Plan:   Therapy options:  Physical therapy treatment to continue  Planned therapy interventions:  Neuromuscular Re-education (CPT 91304), Therapeutic Activities (CPT 22657) and Therapeutic Exercise (CPT 22569)  Frequency:  2x week  Duration in weeks:  8  Discussed with:  Patient      Functional Assessment Used        Referring provider co-signature:  I have reviewed this plan of care and my co-signature certifies the need for services.    Certification Period: 02/19/2024 to  04/19/24    Physician Signature: ________________________________ Date: ______________

## 2024-02-22 NOTE — H&P (VIEW-ONLY)
Mini Pham is a 74 y.o. female  here today for further discussion of their left knee arthritis and left knee pain.  They have tried and failed most all conservative measures and are ready to move forward with surgery in the form of a total  knee arthroplasty.  They have obtained the appropriate clearances if necessary.  They have pain on a daily basis, pain at night, pain that affects her activities of daily living.  They have no history of blood clots.  They are ready to move forward with surgery.  We are planning on using [place] triathlon cementless with Tadeo robotic implantation as their implants.  They will be having their surgery at [facility].      Past Medical History:   Diagnosis Date    Acute nasopharyngitis 12/21/2023    Anemia, pernicious     Anxiety     Arrhythmia     PVC in past    Arthritis 2005    Atrophic gastritis     Bronchitis     High cholesterol 2000    History of iron deficiency anemia 10/18/2023    Hyperlipidemia     Hypertension     Infectious disease     Intestinal metaplasia of stomach 10/03/2022    Osteoarthritis     Pain 2005    Arthritis pain    Prediabetes     fasting glucose 102    Pregnant 1973    Termination    Tendonitis     chronic, elbows and wrists     Past Surgical History:   Procedure Laterality Date    CHOLECYSTECTOMY  2014     Social Connections: Socially Isolated (9/30/2022)    Social Connection and Isolation Panel [NHANES]     Frequency of Communication with Friends and Family: More than three times a week     Frequency of Social Gatherings with Friends and Family: Twice a week     Attends Mu-ism Services: Never     Active Member of Clubs or Organizations: No     Attends Club or Organization Meetings: Not on file     Marital Status: Never        Current Outpatient Medications:     losartan, TAKE 1 TABLET DAILY (DOSE  INCREASE) (Patient taking differently: 100 mg, DAILY, AM), Taking    amLODIPine, 10 mg, Oral, DAILY (Patient taking differently:  10 mg, Oral, DAILY, AM), Taking    citalopram, 20 mg, Oral, DAILY (Patient taking differently: 20 mg, Oral, DAILY, AM), Taking    atorvastatin, 10 mg, Oral, DAILY (Patient taking differently: 10 mg, Oral, DAILY, AM), Taking    VITAMIN D PO, Take 2,000 Units by mouth., Taking    Cyanocobalamin (VITAMIN B 12 PO), Take 2,500 mcg by mouth. Under tongue, Taking  Patient has no known allergies.      Past medical history social history surgical history review of systems reviewed and is otherwise noncontributory except for above    Exam    Alert and oriented x3, no apparent distress  Normocephalic atraumatic, trachea midline  Breathing is nonlabored  Abdomen is nonacute  Neurovascularly intact in bilateral lower extremities.  Soft Calf Compartments without signs of DVT.  Right lower extremity: SILT L4-5, S1.  PF, DF, EHL.  Good DP Pulse.  Soft Calf Compartments.  Right knee has patellofemoral grind pain at tibiofemoral joint line.  She is able to flex from 10 to 105 degrees.  She has pain deep flexion.  Benign right hip exam.  Left lower extremity: SILT L4-5, S1.  PF, DF, EHL.  Good DP Pulse.  Soft Calf Compartments.  Left knee has severe patellofemoral grind.  She is able to flex from 15 to 85 degrees.  She has pain with deep flexion.  No significant laxity varus or valgus stress.  No anterior posterior instability.  Benign left hip exam.    Images    All images were reviewed which are appropriate for templating purposes.  They show evidence of joint space narrowing, osteophyte formation, subchondral sclerosis.    Impression    1) left knee pain  2) left knee Arthritis     Plan    All the risk benefits and side effects of surgery were discussed including pain, bleeding, infection, injury to surrounding organs vessels, heart attack, stroke, fracture, dislocation, need for further surgery, and possibly death.  We will plan on going forward with a total knee arthroplasty.  We will plan on using aspirin for DVT prophylaxis.   Patient will get all of their postoperative prescriptions today.  Patient will plan on going home the the same day if doing well.  We will plan on using [place] triathlon cementless with Tadeo robotic implantation as their surgical implants.  All of their questions were answered and they agreed expressed full understanding.  We will see them on the day of surgery.  They will get a call from our surgical team about start time and arrival time of surgery.

## 2024-02-24 DIAGNOSIS — I10 ESSENTIAL HYPERTENSION: ICD-10-CM

## 2024-02-26 ENCOUNTER — APPOINTMENT (OUTPATIENT)
Dept: RADIOLOGY | Facility: MEDICAL CENTER | Age: 74
End: 2024-02-26
Attending: PHYSICIAN ASSISTANT
Payer: MEDICARE

## 2024-02-26 ENCOUNTER — HOSPITAL ENCOUNTER (OUTPATIENT)
Facility: MEDICAL CENTER | Age: 74
End: 2024-02-27
Attending: ORTHOPAEDIC SURGERY | Admitting: ORTHOPAEDIC SURGERY
Payer: MEDICARE

## 2024-02-26 ENCOUNTER — ANESTHESIA (OUTPATIENT)
Dept: SURGERY | Facility: MEDICAL CENTER | Age: 74
End: 2024-02-26
Payer: MEDICARE

## 2024-02-26 ENCOUNTER — ANESTHESIA EVENT (OUTPATIENT)
Dept: SURGERY | Facility: MEDICAL CENTER | Age: 74
End: 2024-02-26
Payer: MEDICARE

## 2024-02-26 DIAGNOSIS — G89.18 POSTOPERATIVE PAIN: ICD-10-CM

## 2024-02-26 DIAGNOSIS — M17.12 PRIMARY OSTEOARTHRITIS OF LEFT KNEE: ICD-10-CM

## 2024-02-26 PROCEDURE — 700105 HCHG RX REV CODE 258: Performed by: ORTHOPAEDIC SURGERY

## 2024-02-26 PROCEDURE — 700101 HCHG RX REV CODE 250: Performed by: ORTHOPAEDIC SURGERY

## 2024-02-26 PROCEDURE — C1776 JOINT DEVICE (IMPLANTABLE): HCPCS | Performed by: ORTHOPAEDIC SURGERY

## 2024-02-26 PROCEDURE — 700105 HCHG RX REV CODE 258: Performed by: ANESTHESIOLOGY

## 2024-02-26 PROCEDURE — 160031 HCHG SURGERY MINUTES - 1ST 30 MINS LEVEL 5: Performed by: ORTHOPAEDIC SURGERY

## 2024-02-26 PROCEDURE — 502714 HCHG ROBOTIC SURGERY SERVICES: Performed by: ORTHOPAEDIC SURGERY

## 2024-02-26 PROCEDURE — 97162 PT EVAL MOD COMPLEX 30 MIN: CPT

## 2024-02-26 PROCEDURE — 770030 HCHG ROOM/CARE - EXTENDED RECOVERY EACH 15 MIN

## 2024-02-26 PROCEDURE — 160048 HCHG OR STATISTICAL LEVEL 1-5: Performed by: ORTHOPAEDIC SURGERY

## 2024-02-26 PROCEDURE — 27447 TOTAL KNEE ARTHROPLASTY: CPT | Mod: ASROC,LT | Performed by: PHYSICIAN ASSISTANT

## 2024-02-26 PROCEDURE — 160002 HCHG RECOVERY MINUTES (STAT): Performed by: ORTHOPAEDIC SURGERY

## 2024-02-26 PROCEDURE — 700101 HCHG RX REV CODE 250: Performed by: PHYSICIAN ASSISTANT

## 2024-02-26 PROCEDURE — 160009 HCHG ANES TIME/MIN: Performed by: ORTHOPAEDIC SURGERY

## 2024-02-26 PROCEDURE — RXMED WILLOW AMBULATORY MEDICATION CHARGE: Performed by: ORTHOPAEDIC SURGERY

## 2024-02-26 PROCEDURE — 700111 HCHG RX REV CODE 636 W/ 250 OVERRIDE (IP): Performed by: ORTHOPAEDIC SURGERY

## 2024-02-26 PROCEDURE — 700102 HCHG RX REV CODE 250 W/ 637 OVERRIDE(OP): Performed by: PHYSICIAN ASSISTANT

## 2024-02-26 PROCEDURE — A9270 NON-COVERED ITEM OR SERVICE: HCPCS | Performed by: PHYSICIAN ASSISTANT

## 2024-02-26 PROCEDURE — 27447 TOTAL KNEE ARTHROPLASTY: CPT | Mod: LT | Performed by: ORTHOPAEDIC SURGERY

## 2024-02-26 PROCEDURE — 160035 HCHG PACU - 1ST 60 MINS PHASE I: Performed by: ORTHOPAEDIC SURGERY

## 2024-02-26 PROCEDURE — 20985 CPTR-ASST DIR MS PX: CPT | Mod: ASROC | Performed by: PHYSICIAN ASSISTANT

## 2024-02-26 PROCEDURE — 97607 NEG PRS WND THR NDME<=50SQCM: CPT | Performed by: ORTHOPAEDIC SURGERY

## 2024-02-26 PROCEDURE — 700101 HCHG RX REV CODE 250: Performed by: ANESTHESIOLOGY

## 2024-02-26 PROCEDURE — 97607 NEG PRS WND THR NDME<=50SQCM: CPT | Mod: ASROC | Performed by: PHYSICIAN ASSISTANT

## 2024-02-26 PROCEDURE — 700111 HCHG RX REV CODE 636 W/ 250 OVERRIDE (IP): Mod: JZ | Performed by: ANESTHESIOLOGY

## 2024-02-26 PROCEDURE — C1713 ANCHOR/SCREW BN/BN,TIS/BN: HCPCS | Performed by: ORTHOPAEDIC SURGERY

## 2024-02-26 PROCEDURE — 700111 HCHG RX REV CODE 636 W/ 250 OVERRIDE (IP): Performed by: PHYSICIAN ASSISTANT

## 2024-02-26 PROCEDURE — 160042 HCHG SURGERY MINUTES - EA ADDL 1 MIN LEVEL 5: Performed by: ORTHOPAEDIC SURGERY

## 2024-02-26 PROCEDURE — 700102 HCHG RX REV CODE 250 W/ 637 OVERRIDE(OP): Performed by: ANESTHESIOLOGY

## 2024-02-26 PROCEDURE — 502000 HCHG MISC OR IMPLANTS RC 0278: Performed by: ORTHOPAEDIC SURGERY

## 2024-02-26 PROCEDURE — A9270 NON-COVERED ITEM OR SERVICE: HCPCS | Performed by: ANESTHESIOLOGY

## 2024-02-26 PROCEDURE — 94760 N-INVAS EAR/PLS OXIMETRY 1: CPT

## 2024-02-26 PROCEDURE — 64447 NJX AA&/STRD FEMORAL NRV IMG: CPT | Performed by: ORTHOPAEDIC SURGERY

## 2024-02-26 PROCEDURE — 20985 CPTR-ASST DIR MS PX: CPT | Performed by: ORTHOPAEDIC SURGERY

## 2024-02-26 PROCEDURE — 700105 HCHG RX REV CODE 258: Performed by: PHYSICIAN ASSISTANT

## 2024-02-26 PROCEDURE — 73560 X-RAY EXAM OF KNEE 1 OR 2: CPT | Mod: LT

## 2024-02-26 DEVICE — IMPLANTABLE DEVICE: Type: IMPLANTABLE DEVICE | Status: FUNCTIONAL

## 2024-02-26 DEVICE — CEMENT ORTHOPEDIC HV US  (10/PK): Type: IMPLANTABLE DEVICE | Status: FUNCTIONAL

## 2024-02-26 DEVICE — COMPONENT PATELLAR TRIATHLON X3 ASYMMETRIC A35 10MM (1EA): Type: IMPLANTABLE DEVICE | Status: FUNCTIONAL

## 2024-02-26 RX ORDER — SODIUM CHLORIDE 9 MG/ML
INJECTION, SOLUTION INTRAMUSCULAR; INTRAVENOUS; SUBCUTANEOUS
Status: DISCONTINUED | OUTPATIENT
Start: 2024-02-26 | End: 2024-02-26 | Stop reason: HOSPADM

## 2024-02-26 RX ORDER — POLYETHYLENE GLYCOL 3350 17 G/17G
1 POWDER, FOR SOLUTION ORAL 2 TIMES DAILY PRN
Status: DISCONTINUED | OUTPATIENT
Start: 2024-02-26 | End: 2024-02-27 | Stop reason: HOSPADM

## 2024-02-26 RX ORDER — KETOROLAC TROMETHAMINE 15 MG/ML
15 INJECTION, SOLUTION INTRAMUSCULAR; INTRAVENOUS EVERY 6 HOURS
Status: DISCONTINUED | OUTPATIENT
Start: 2024-02-26 | End: 2024-02-27 | Stop reason: HOSPADM

## 2024-02-26 RX ORDER — TRANEXAMIC ACID 100 MG/ML
INJECTION, SOLUTION INTRAVENOUS PRN
Status: DISCONTINUED | OUTPATIENT
Start: 2024-02-26 | End: 2024-02-26 | Stop reason: SURG

## 2024-02-26 RX ORDER — ONDANSETRON 2 MG/ML
4 INJECTION INTRAMUSCULAR; INTRAVENOUS
Status: COMPLETED | OUTPATIENT
Start: 2024-02-26 | End: 2024-02-26

## 2024-02-26 RX ORDER — OXYCODONE HYDROCHLORIDE 5 MG/1
5 TABLET ORAL
Status: DISCONTINUED | OUTPATIENT
Start: 2024-02-26 | End: 2024-02-27 | Stop reason: HOSPADM

## 2024-02-26 RX ORDER — MAGNESIUM SULFATE HEPTAHYDRATE 40 MG/ML
INJECTION, SOLUTION INTRAVENOUS PRN
Status: DISCONTINUED | OUTPATIENT
Start: 2024-02-26 | End: 2024-02-26 | Stop reason: SURG

## 2024-02-26 RX ORDER — ACETAMINOPHEN 325 MG/1
650 TABLET ORAL EVERY 6 HOURS
Status: DISCONTINUED | OUTPATIENT
Start: 2024-02-26 | End: 2024-02-27 | Stop reason: HOSPADM

## 2024-02-26 RX ORDER — DIPHENHYDRAMINE HYDROCHLORIDE 50 MG/ML
25 INJECTION INTRAMUSCULAR; INTRAVENOUS EVERY 6 HOURS PRN
Status: DISCONTINUED | OUTPATIENT
Start: 2024-02-26 | End: 2024-02-27 | Stop reason: HOSPADM

## 2024-02-26 RX ORDER — EPINEPHRINE 1 MG/ML(1)
AMPUL (ML) INJECTION
Status: DISCONTINUED | OUTPATIENT
Start: 2024-02-26 | End: 2024-02-26 | Stop reason: HOSPADM

## 2024-02-26 RX ORDER — HYDROMORPHONE HYDROCHLORIDE 1 MG/ML
0.2 INJECTION, SOLUTION INTRAMUSCULAR; INTRAVENOUS; SUBCUTANEOUS
Status: DISCONTINUED | OUTPATIENT
Start: 2024-02-26 | End: 2024-02-26 | Stop reason: HOSPADM

## 2024-02-26 RX ORDER — HYDROMORPHONE HYDROCHLORIDE 2 MG/ML
INJECTION, SOLUTION INTRAMUSCULAR; INTRAVENOUS; SUBCUTANEOUS PRN
Status: DISCONTINUED | OUTPATIENT
Start: 2024-02-26 | End: 2024-02-26 | Stop reason: SURG

## 2024-02-26 RX ORDER — DEXAMETHASONE SODIUM PHOSPHATE 4 MG/ML
10 INJECTION, SOLUTION INTRA-ARTICULAR; INTRALESIONAL; INTRAMUSCULAR; INTRAVENOUS; SOFT TISSUE ONCE
Status: DISCONTINUED | OUTPATIENT
Start: 2024-02-27 | End: 2024-02-27 | Stop reason: HOSPADM

## 2024-02-26 RX ORDER — DOCUSATE SODIUM 100 MG/1
100 CAPSULE, LIQUID FILLED ORAL 2 TIMES DAILY
Status: DISCONTINUED | OUTPATIENT
Start: 2024-02-26 | End: 2024-02-27 | Stop reason: HOSPADM

## 2024-02-26 RX ORDER — OMEPRAZOLE 20 MG/1
20 CAPSULE, DELAYED RELEASE ORAL 2 TIMES DAILY PRN
Status: DISCONTINUED | OUTPATIENT
Start: 2024-02-26 | End: 2024-02-27 | Stop reason: HOSPADM

## 2024-02-26 RX ORDER — HYDROMORPHONE HYDROCHLORIDE 1 MG/ML
0.5 INJECTION, SOLUTION INTRAMUSCULAR; INTRAVENOUS; SUBCUTANEOUS
Status: DISCONTINUED | OUTPATIENT
Start: 2024-02-26 | End: 2024-02-27 | Stop reason: HOSPADM

## 2024-02-26 RX ORDER — SCOLOPAMINE TRANSDERMAL SYSTEM 1 MG/1
1 PATCH, EXTENDED RELEASE TRANSDERMAL
Status: DISCONTINUED | OUTPATIENT
Start: 2024-02-26 | End: 2024-02-27 | Stop reason: HOSPADM

## 2024-02-26 RX ORDER — DEXTROSE MONOHYDRATE, SODIUM CHLORIDE, AND POTASSIUM CHLORIDE 50; 1.49; 4.5 G/1000ML; G/1000ML; G/1000ML
INJECTION, SOLUTION INTRAVENOUS CONTINUOUS
Status: DISPENSED | OUTPATIENT
Start: 2024-02-26 | End: 2024-02-26

## 2024-02-26 RX ORDER — ROPIVACAINE HYDROCHLORIDE 5 MG/ML
INJECTION, SOLUTION EPIDURAL; INFILTRATION; PERINEURAL
Status: DISCONTINUED | OUTPATIENT
Start: 2024-02-26 | End: 2024-02-26 | Stop reason: HOSPADM

## 2024-02-26 RX ORDER — GABAPENTIN 300 MG/1
300 CAPSULE ORAL ONCE
Status: COMPLETED | OUTPATIENT
Start: 2024-02-26 | End: 2024-02-26

## 2024-02-26 RX ORDER — DIPHENHYDRAMINE HCL 25 MG
25 TABLET ORAL NIGHTLY PRN
Status: DISCONTINUED | OUTPATIENT
Start: 2024-02-27 | End: 2024-02-27 | Stop reason: HOSPADM

## 2024-02-26 RX ORDER — TRAMADOL HYDROCHLORIDE 50 MG/1
50 TABLET ORAL EVERY 6 HOURS PRN
Qty: 40 TABLET | Refills: 0 | Status: SHIPPED | OUTPATIENT
Start: 2024-02-26 | End: 2024-03-15 | Stop reason: SINTOL

## 2024-02-26 RX ORDER — OXYCODONE HCL 5 MG/5 ML
5 SOLUTION, ORAL ORAL
Status: COMPLETED | OUTPATIENT
Start: 2024-02-26 | End: 2024-02-26

## 2024-02-26 RX ORDER — DEXAMETHASONE SODIUM PHOSPHATE 4 MG/ML
4 INJECTION, SOLUTION INTRA-ARTICULAR; INTRALESIONAL; INTRAMUSCULAR; INTRAVENOUS; SOFT TISSUE
Status: DISCONTINUED | OUTPATIENT
Start: 2024-02-26 | End: 2024-02-27 | Stop reason: HOSPADM

## 2024-02-26 RX ORDER — ROPIVACAINE HYDROCHLORIDE 5 MG/ML
INJECTION, SOLUTION EPIDURAL; INFILTRATION; PERINEURAL PRN
Status: DISCONTINUED | OUTPATIENT
Start: 2024-02-26 | End: 2024-02-26 | Stop reason: SURG

## 2024-02-26 RX ORDER — LOSARTAN POTASSIUM 50 MG/1
100 TABLET ORAL
Status: DISCONTINUED | OUTPATIENT
Start: 2024-02-26 | End: 2024-02-27 | Stop reason: HOSPADM

## 2024-02-26 RX ORDER — ONDANSETRON 2 MG/ML
INJECTION INTRAMUSCULAR; INTRAVENOUS PRN
Status: DISCONTINUED | OUTPATIENT
Start: 2024-02-26 | End: 2024-02-26 | Stop reason: SURG

## 2024-02-26 RX ORDER — BISACODYL 10 MG
10 SUPPOSITORY, RECTAL RECTAL
Status: DISCONTINUED | OUTPATIENT
Start: 2024-02-26 | End: 2024-02-27 | Stop reason: HOSPADM

## 2024-02-26 RX ORDER — SODIUM CHLORIDE, SODIUM LACTATE, POTASSIUM CHLORIDE, CALCIUM CHLORIDE 600; 310; 30; 20 MG/100ML; MG/100ML; MG/100ML; MG/100ML
INJECTION, SOLUTION INTRAVENOUS CONTINUOUS
Status: ACTIVE | OUTPATIENT
Start: 2024-02-26 | End: 2024-02-26

## 2024-02-26 RX ORDER — IBUPROFEN 400 MG/1
800 TABLET ORAL 3 TIMES DAILY PRN
Status: DISCONTINUED | OUTPATIENT
Start: 2024-02-29 | End: 2024-02-27 | Stop reason: HOSPADM

## 2024-02-26 RX ORDER — ACETAMINOPHEN 500 MG
1000 TABLET ORAL ONCE
Status: COMPLETED | OUTPATIENT
Start: 2024-02-26 | End: 2024-02-26

## 2024-02-26 RX ORDER — SODIUM CHLORIDE, SODIUM LACTATE, POTASSIUM CHLORIDE, CALCIUM CHLORIDE 600; 310; 30; 20 MG/100ML; MG/100ML; MG/100ML; MG/100ML
INJECTION, SOLUTION INTRAVENOUS CONTINUOUS
Status: DISCONTINUED | OUTPATIENT
Start: 2024-02-26 | End: 2024-02-26 | Stop reason: HOSPADM

## 2024-02-26 RX ORDER — EPHEDRINE SULFATE 50 MG/ML
5 INJECTION, SOLUTION INTRAVENOUS
Status: DISCONTINUED | OUTPATIENT
Start: 2024-02-26 | End: 2024-02-26 | Stop reason: HOSPADM

## 2024-02-26 RX ORDER — CEFAZOLIN SODIUM 1 G/3ML
2 INJECTION, POWDER, FOR SOLUTION INTRAMUSCULAR; INTRAVENOUS ONCE
Status: COMPLETED | OUTPATIENT
Start: 2024-02-26 | End: 2024-02-26

## 2024-02-26 RX ORDER — DIPHENHYDRAMINE HCL 25 MG
25 TABLET ORAL EVERY 6 HOURS PRN
Status: DISCONTINUED | OUTPATIENT
Start: 2024-02-26 | End: 2024-02-27 | Stop reason: HOSPADM

## 2024-02-26 RX ORDER — AMOXICILLIN 250 MG
1 CAPSULE ORAL NIGHTLY
Status: DISCONTINUED | OUTPATIENT
Start: 2024-02-26 | End: 2024-02-27 | Stop reason: HOSPADM

## 2024-02-26 RX ORDER — KETOROLAC TROMETHAMINE 30 MG/ML
INJECTION, SOLUTION INTRAMUSCULAR; INTRAVENOUS
Status: DISCONTINUED | OUTPATIENT
Start: 2024-02-26 | End: 2024-02-26 | Stop reason: HOSPADM

## 2024-02-26 RX ORDER — ACETAMINOPHEN 325 MG/1
650 TABLET ORAL EVERY 6 HOURS PRN
Status: DISCONTINUED | OUTPATIENT
Start: 2024-03-02 | End: 2024-02-27 | Stop reason: HOSPADM

## 2024-02-26 RX ORDER — AMLODIPINE BESYLATE 5 MG/1
10 TABLET ORAL
Status: DISCONTINUED | OUTPATIENT
Start: 2024-02-26 | End: 2024-02-26

## 2024-02-26 RX ORDER — LIDOCAINE HYDROCHLORIDE 20 MG/ML
INJECTION, SOLUTION EPIDURAL; INFILTRATION; INTRACAUDAL; PERINEURAL PRN
Status: DISCONTINUED | OUTPATIENT
Start: 2024-02-26 | End: 2024-02-26 | Stop reason: SURG

## 2024-02-26 RX ORDER — AMOXICILLIN 250 MG
1 CAPSULE ORAL
Status: DISCONTINUED | OUTPATIENT
Start: 2024-02-26 | End: 2024-02-27 | Stop reason: HOSPADM

## 2024-02-26 RX ORDER — EPHEDRINE SULFATE 50 MG/ML
INJECTION, SOLUTION INTRAVENOUS PRN
Status: DISCONTINUED | OUTPATIENT
Start: 2024-02-26 | End: 2024-02-26 | Stop reason: SURG

## 2024-02-26 RX ORDER — HYDRALAZINE HYDROCHLORIDE 20 MG/ML
5 INJECTION INTRAMUSCULAR; INTRAVENOUS
Status: DISCONTINUED | OUTPATIENT
Start: 2024-02-26 | End: 2024-02-26 | Stop reason: HOSPADM

## 2024-02-26 RX ORDER — SODIUM CHLORIDE, SODIUM LACTATE, POTASSIUM CHLORIDE, CALCIUM CHLORIDE 600; 310; 30; 20 MG/100ML; MG/100ML; MG/100ML; MG/100ML
INJECTION, SOLUTION INTRAVENOUS
Status: DISCONTINUED | OUTPATIENT
Start: 2024-02-26 | End: 2024-02-26 | Stop reason: SURG

## 2024-02-26 RX ORDER — ATORVASTATIN CALCIUM 10 MG/1
10 TABLET, FILM COATED ORAL DAILY
Status: DISCONTINUED | OUTPATIENT
Start: 2024-02-27 | End: 2024-02-27 | Stop reason: HOSPADM

## 2024-02-26 RX ORDER — ONDANSETRON 2 MG/ML
4 INJECTION INTRAMUSCULAR; INTRAVENOUS EVERY 4 HOURS PRN
Status: DISCONTINUED | OUTPATIENT
Start: 2024-02-26 | End: 2024-02-27 | Stop reason: HOSPADM

## 2024-02-26 RX ORDER — TRAMADOL HYDROCHLORIDE 50 MG/1
50 TABLET ORAL EVERY 6 HOURS PRN
Status: CANCELLED | OUTPATIENT
Start: 2024-02-26

## 2024-02-26 RX ORDER — LOSARTAN POTASSIUM 100 MG/1
TABLET ORAL
Qty: 90 TABLET | Refills: 3 | Status: SHIPPED | OUTPATIENT
Start: 2024-02-26

## 2024-02-26 RX ORDER — CITALOPRAM 20 MG/1
20 TABLET ORAL DAILY
Status: DISCONTINUED | OUTPATIENT
Start: 2024-02-27 | End: 2024-02-27 | Stop reason: HOSPADM

## 2024-02-26 RX ORDER — OXYCODONE HCL 5 MG/5 ML
10 SOLUTION, ORAL ORAL
Status: COMPLETED | OUTPATIENT
Start: 2024-02-26 | End: 2024-02-26

## 2024-02-26 RX ORDER — HALOPERIDOL 5 MG/ML
1 INJECTION INTRAMUSCULAR EVERY 6 HOURS PRN
Status: DISCONTINUED | OUTPATIENT
Start: 2024-02-26 | End: 2024-02-27 | Stop reason: HOSPADM

## 2024-02-26 RX ORDER — ENEMA 19; 7 G/133ML; G/133ML
1 ENEMA RECTAL
Status: DISCONTINUED | OUTPATIENT
Start: 2024-02-26 | End: 2024-02-27 | Stop reason: HOSPADM

## 2024-02-26 RX ORDER — DEXAMETHASONE SODIUM PHOSPHATE 4 MG/ML
INJECTION, SOLUTION INTRA-ARTICULAR; INTRALESIONAL; INTRAMUSCULAR; INTRAVENOUS; SOFT TISSUE PRN
Status: DISCONTINUED | OUTPATIENT
Start: 2024-02-26 | End: 2024-02-26 | Stop reason: SURG

## 2024-02-26 RX ORDER — ASPIRIN 81 MG/1
81 TABLET ORAL 2 TIMES DAILY
Status: DISCONTINUED | OUTPATIENT
Start: 2024-02-26 | End: 2024-02-27 | Stop reason: HOSPADM

## 2024-02-26 RX ORDER — OXYCODONE HYDROCHLORIDE 10 MG/1
10 TABLET ORAL
Status: DISCONTINUED | OUTPATIENT
Start: 2024-02-26 | End: 2024-02-27 | Stop reason: HOSPADM

## 2024-02-26 RX ORDER — HYDROMORPHONE HYDROCHLORIDE 1 MG/ML
0.1 INJECTION, SOLUTION INTRAMUSCULAR; INTRAVENOUS; SUBCUTANEOUS
Status: DISCONTINUED | OUTPATIENT
Start: 2024-02-26 | End: 2024-02-26 | Stop reason: HOSPADM

## 2024-02-26 RX ORDER — AMLODIPINE BESYLATE 5 MG/1
10 TABLET ORAL
Status: DISCONTINUED | OUTPATIENT
Start: 2024-02-27 | End: 2024-02-27 | Stop reason: HOSPADM

## 2024-02-26 RX ORDER — TAMSULOSIN HYDROCHLORIDE 0.4 MG/1
0.4 CAPSULE ORAL
Status: DISCONTINUED | OUTPATIENT
Start: 2024-02-26 | End: 2024-02-27 | Stop reason: HOSPADM

## 2024-02-26 RX ORDER — METOPROLOL TARTRATE 1 MG/ML
1 INJECTION, SOLUTION INTRAVENOUS
Status: DISCONTINUED | OUTPATIENT
Start: 2024-02-26 | End: 2024-02-26 | Stop reason: HOSPADM

## 2024-02-26 RX ORDER — TRAMADOL HYDROCHLORIDE 50 MG/1
50 TABLET ORAL EVERY 4 HOURS PRN
Status: DISCONTINUED | OUTPATIENT
Start: 2024-02-26 | End: 2024-02-27 | Stop reason: HOSPADM

## 2024-02-26 RX ORDER — MIDAZOLAM HYDROCHLORIDE 1 MG/ML
INJECTION INTRAMUSCULAR; INTRAVENOUS PRN
Status: DISCONTINUED | OUTPATIENT
Start: 2024-02-26 | End: 2024-02-26 | Stop reason: SURG

## 2024-02-26 RX ORDER — LABETALOL HYDROCHLORIDE 5 MG/ML
5 INJECTION, SOLUTION INTRAVENOUS
Status: DISCONTINUED | OUTPATIENT
Start: 2024-02-26 | End: 2024-02-26 | Stop reason: HOSPADM

## 2024-02-26 RX ORDER — AMLODIPINE BESYLATE 10 MG/1
TABLET ORAL
Qty: 90 TABLET | Refills: 3 | Status: SHIPPED | OUTPATIENT
Start: 2024-02-26

## 2024-02-26 RX ORDER — HYDROMORPHONE HYDROCHLORIDE 1 MG/ML
0.4 INJECTION, SOLUTION INTRAMUSCULAR; INTRAVENOUS; SUBCUTANEOUS
Status: DISCONTINUED | OUTPATIENT
Start: 2024-02-26 | End: 2024-02-26 | Stop reason: HOSPADM

## 2024-02-26 RX ORDER — HALOPERIDOL 5 MG/ML
1 INJECTION INTRAMUSCULAR
Status: DISCONTINUED | OUTPATIENT
Start: 2024-02-26 | End: 2024-02-26 | Stop reason: HOSPADM

## 2024-02-26 RX ORDER — ONDANSETRON 4 MG/1
4 TABLET, ORALLY DISINTEGRATING ORAL EVERY 6 HOURS PRN
Status: DISCONTINUED | OUTPATIENT
Start: 2024-02-26 | End: 2024-02-27 | Stop reason: HOSPADM

## 2024-02-26 RX ADMIN — ACETAMINOPHEN 650 MG: 325 TABLET ORAL at 23:49

## 2024-02-26 RX ADMIN — ACETAMINOPHEN 1000 MG: 500 TABLET ORAL at 09:44

## 2024-02-26 RX ADMIN — MIDAZOLAM HYDROCHLORIDE 1 MG: 1 INJECTION, SOLUTION INTRAMUSCULAR; INTRAVENOUS at 11:27

## 2024-02-26 RX ADMIN — KETOROLAC TROMETHAMINE 15 MG: 15 INJECTION, SOLUTION INTRAMUSCULAR; INTRAVENOUS at 15:39

## 2024-02-26 RX ADMIN — GABAPENTIN 300 MG: 300 CAPSULE ORAL at 09:44

## 2024-02-26 RX ADMIN — CEFAZOLIN 2 G: 1 INJECTION, POWDER, FOR SOLUTION INTRAMUSCULAR; INTRAVENOUS at 11:27

## 2024-02-26 RX ADMIN — KETOROLAC TROMETHAMINE 15 MG: 15 INJECTION, SOLUTION INTRAMUSCULAR; INTRAVENOUS at 23:49

## 2024-02-26 RX ADMIN — POTASSIUM CHLORIDE, DEXTROSE MONOHYDRATE AND SODIUM CHLORIDE: 150; 5; 450 INJECTION, SOLUTION INTRAVENOUS at 15:37

## 2024-02-26 RX ADMIN — TRANEXAMIC ACID 1000 MG: 100 INJECTION, SOLUTION INTRAVENOUS at 11:27

## 2024-02-26 RX ADMIN — FENTANYL CITRATE 25 MCG: 50 INJECTION, SOLUTION INTRAMUSCULAR; INTRAVENOUS at 13:07

## 2024-02-26 RX ADMIN — DEXAMETHASONE SODIUM PHOSPHATE 8 MG: 4 INJECTION INTRA-ARTICULAR; INTRALESIONAL; INTRAMUSCULAR; INTRAVENOUS; SOFT TISSUE at 11:27

## 2024-02-26 RX ADMIN — KETOROLAC TROMETHAMINE 15 MG: 15 INJECTION, SOLUTION INTRAMUSCULAR; INTRAVENOUS at 18:53

## 2024-02-26 RX ADMIN — FENTANYL CITRATE 50 MCG: 50 INJECTION, SOLUTION INTRAMUSCULAR; INTRAVENOUS at 11:44

## 2024-02-26 RX ADMIN — EPHEDRINE SULFATE 10 MG: 50 INJECTION, SOLUTION INTRAVENOUS at 11:30

## 2024-02-26 RX ADMIN — LIDOCAINE HYDROCHLORIDE 3 ML: 20 INJECTION, SOLUTION EPIDURAL; INFILTRATION; INTRACAUDAL at 11:02

## 2024-02-26 RX ADMIN — ROPIVACAINE HYDROCHLORIDE 30 ML: 5 INJECTION EPIDURAL; INFILTRATION; PERINEURAL at 11:06

## 2024-02-26 RX ADMIN — HYDROMORPHONE HYDROCHLORIDE 0.5 MG: 2 INJECTION INTRAMUSCULAR; INTRAVENOUS; SUBCUTANEOUS at 12:03

## 2024-02-26 RX ADMIN — CEFAZOLIN 2 G: 2 INJECTION, POWDER, FOR SOLUTION INTRAMUSCULAR; INTRAVENOUS at 15:37

## 2024-02-26 RX ADMIN — ONDANSETRON 4 MG: 2 INJECTION INTRAMUSCULAR; INTRAVENOUS at 13:07

## 2024-02-26 RX ADMIN — MAGNESIUM SULFATE HEPTAHYDRATE 4 G: 2 INJECTION, SOLUTION INTRAVENOUS at 11:48

## 2024-02-26 RX ADMIN — DOCUSATE SODIUM 50MG AND SENNOSIDES 8.6MG 1 TABLET: 8.6; 5 TABLET, FILM COATED ORAL at 21:23

## 2024-02-26 RX ADMIN — TAMSULOSIN HYDROCHLORIDE 0.4 MG: 0.4 CAPSULE ORAL at 15:38

## 2024-02-26 RX ADMIN — ACETAMINOPHEN 650 MG: 325 TABLET ORAL at 15:38

## 2024-02-26 RX ADMIN — OXYCODONE HYDROCHLORIDE 5 MG: 5 SOLUTION ORAL at 12:56

## 2024-02-26 RX ADMIN — FENTANYL CITRATE 25 MCG: 50 INJECTION, SOLUTION INTRAMUSCULAR; INTRAVENOUS at 13:29

## 2024-02-26 RX ADMIN — FENTANYL CITRATE 50 MCG: 50 INJECTION, SOLUTION INTRAMUSCULAR; INTRAVENOUS at 11:02

## 2024-02-26 RX ADMIN — MIDAZOLAM HYDROCHLORIDE 1 MG: 1 INJECTION, SOLUTION INTRAMUSCULAR; INTRAVENOUS at 11:02

## 2024-02-26 RX ADMIN — LOSARTAN POTASSIUM 100 MG: 50 TABLET, FILM COATED ORAL at 15:41

## 2024-02-26 RX ADMIN — DOCUSATE SODIUM 100 MG: 100 CAPSULE, LIQUID FILLED ORAL at 18:52

## 2024-02-26 RX ADMIN — ACETAMINOPHEN 650 MG: 325 TABLET ORAL at 18:52

## 2024-02-26 RX ADMIN — FENTANYL CITRATE 100 MCG: 50 INJECTION, SOLUTION INTRAMUSCULAR; INTRAVENOUS at 11:27

## 2024-02-26 RX ADMIN — SODIUM CHLORIDE, POTASSIUM CHLORIDE, SODIUM LACTATE AND CALCIUM CHLORIDE: 600; 310; 30; 20 INJECTION, SOLUTION INTRAVENOUS at 11:23

## 2024-02-26 RX ADMIN — ONDANSETRON 8 MG: 2 INJECTION INTRAMUSCULAR; INTRAVENOUS at 12:20

## 2024-02-26 RX ADMIN — ASPIRIN 81 MG: 81 TABLET, COATED ORAL at 21:23

## 2024-02-26 RX ADMIN — TRANEXAMIC ACID 1000 MG: 100 INJECTION, SOLUTION INTRAVENOUS at 12:20

## 2024-02-26 RX ADMIN — SODIUM CHLORIDE, POTASSIUM CHLORIDE, SODIUM LACTATE AND CALCIUM CHLORIDE: 600; 310; 30; 20 INJECTION, SOLUTION INTRAVENOUS at 09:44

## 2024-02-26 RX ADMIN — PROPOFOL 200 MG: 10 INJECTION, EMULSION INTRAVENOUS at 11:27

## 2024-02-26 ASSESSMENT — COGNITIVE AND FUNCTIONAL STATUS - GENERAL
TURNING FROM BACK TO SIDE WHILE IN FLAT BAD: A LITTLE
CLIMB 3 TO 5 STEPS WITH RAILING: TOTAL
MOBILITY SCORE: 13
WALKING IN HOSPITAL ROOM: TOTAL
SUGGESTED CMS G CODE MODIFIER MOBILITY: CL
MOVING TO AND FROM BED TO CHAIR: A LITTLE
STANDING UP FROM CHAIR USING ARMS: A LOT
MOVING FROM LYING ON BACK TO SITTING ON SIDE OF FLAT BED: A LITTLE

## 2024-02-26 ASSESSMENT — PATIENT HEALTH QUESTIONNAIRE - PHQ9
1. LITTLE INTEREST OR PLEASURE IN DOING THINGS: NOT AT ALL
2. FEELING DOWN, DEPRESSED, IRRITABLE, OR HOPELESS: NOT AT ALL
SUM OF ALL RESPONSES TO PHQ9 QUESTIONS 1 AND 2: 0

## 2024-02-26 ASSESSMENT — PAIN DESCRIPTION - PAIN TYPE
TYPE: SURGICAL PAIN

## 2024-02-26 ASSESSMENT — PAIN SCALES - GENERAL: PAIN_LEVEL: 0

## 2024-02-26 ASSESSMENT — FIBROSIS 4 INDEX
FIB4 SCORE: 1.07
FIB4 SCORE: 1.07

## 2024-02-26 ASSESSMENT — GAIT ASSESSMENTS: GAIT LEVEL OF ASSIST: UNABLE TO PARTICIPATE

## 2024-02-26 NOTE — INTERVAL H&P NOTE
H&P reviewed. The patient was examined and there are no changes to the H&P    The risks of surgery were discussed with the patient.   These include pain, bleeding, infection, fractures and dislocations as well as damage to surrounding structures or neurovascular compromise.  The risks include the possibility of need for further surgery, lack of healing, lack of symptom relief.  The elevated risk of DVT following a joint replacement was discussed with the patient, and a plan was made for postoperative DVT prophylaxis.  We did discuss the possibility of leg length discrepancy both apparent and actual following joint replacement.  They do express an understanding that these are man-made parts, with limited lifespans, and that we would not be turning them into a bionic human being. We did discuss the various approaches to a joint replacement, and made no guarantees about incision size or postoperative discharge plans. Certainly there could be anesthetic complications such as heart attack, stroke, respiratory failure, or even death.  All of the patient's questions were answered, they were shown models of the implants and images of their x-rays.  she expressed understanding and wished to proceed.   We have told the patient a front wheeled walker will be required in the perioperative period. We did discuss the importance of physical therapy and the risk of permanent stiffness or muscle weakness after surgery.

## 2024-02-26 NOTE — DISCHARGE INSTRUCTIONS
Dr. Benton's Discharge Instructions:  The first week after your joint replacement is a time to recover from the surgery. We expect light exercise to keep you active and mobile. Dr. Benton requires a walker or cane for most patients in the first few days following surgery to try to prevent falls or complications.     Most patients are prescribed two medications for pain control: a narcotic such as Oxycodone or Hydrocodone and a milder medication called Tramadol. These can be alternated for pain control, and the priority is to decrease use of the stronger narcotic as soon as tolerated.   Take your pain medication as appropriate to ensure that your pain is not limiting your recovery. You'll be seen in clinic in 7-10 days (this appointment has already been made, call our office if you're unsure of the time). At that time, we'll prescribe your physical therapy to help with the recovery phase.     -Keep dressing clean and dry. Leave dressing in place until follow up. If you have an incisional vac dressing, the battery may die before your first post operative appointment, but you can leave the surgical dressing in place and it will be removed at your clinic visit. The battery of the dressing may die, and the sponge will inflate because it is no longer holding suction. You can still leave the dressing in place and it will be removed at the office. Call the office if you notice drainage from the surgical dressing.    -OK to shower, keep dressing in place. Pat dressing dry, do not rub incision    -Do not soak incision in bath, hot tub or pool    -Follow up with Dr. Benton at regularly scheduled time    -Call [facility] office at [phone] for questions    -Weight bearing status: as tolerated with walker/cane; Patients who underwent a hip replacement should observe posterior hip precautions    -Take medication as prescribed for DVT prophylaxis    If any questions arise, call your provider.  If your provider is not available,  please feel free to call the Surgical Center at [phone].    MEDICATIONS: Resume taking daily medication.  Take prescribed pain medication with food.  If no medication is prescribed, you may take non-aspirin pain medication if needed.  PAIN MEDICATION CAN BE VERY CONSTIPATING.  Take a stool softener or laxative such as senokot, pericolace, or milk of magnesia if needed.    Last pain medication given at Oxycodone 5 mg at 12:50 pm    What to Expect Post Anesthesia    Rest and take it easy for the first 24 hours.  A responsible adult is recommended to remain with you during that time.  It is normal to feel sleepy.  We encourage you to not do anything that requires balance, judgment or coordination.    FOR 24 HOURS DO NOT:  Drive, operate machinery or run household appliances.  Drink beer or alcoholic beverages.  Make important decisions or sign legal documents.    To avoid nausea, slowly advance diet as tolerated, avoiding spicy or greasy foods for the first day.  Add more substantial food to your diet according to your provider's instructions.  INCREASE FLUIDS AND FIBER TO AVOID CONSTIPATION.    MILD FLU-LIKE SYMPTOMS ARE NORMAL.  YOU MAY EXPERIENCE GENERALIZED MUSCLE ACHES, THROAT IRRITATION, HEADACHE AND/OR SOME NAUSEA.        Peripheral Nerve Block Discharge Instructions from Same Day Surgery:    What to Expect - Lower Extremity  The block may cause you to experience numbness and weakness in your thigh and knee or calf and foot on the same side as your surgery  Numbness, tingling and / or weakness are all normal. For some people, this may be an unpleasant sensation  These issues will be resolved when the local anesthetic wears off   You may experience numbness and tingling in your thigh on the same side as your surgery if the block medicine was injected at your groin area  Numbness will make it difficult to walk  You may have problems with balance and walking so be very careful   Follow your surgeon's  "direction regarding weight bearing on your surgical limb  Be very careful with your numb limb  Precautions  The numbness may affect your balance  Be careful when walking or moving around  Your leg may be weak: be very careful putting weight on it  If your surgeon did not specify a time, you should not bear weight for 24 hours  Be sure to ask for help when you need it  It is better to have help than to fall and hurt yourself  Prevent Injury  Protect the limb like a baby  Beware of exposing your limb to extreme heat or cold or trauma  The limb may be injured without you noticing because it is numb  Keep the limb elevated whenever possible  Do not sleep on the limb  Change the position of the limb regularly  Avoid putting pressure on your surgical limb  Pain Control  The initial block on the day of surgery will make your extremity feel \"numb\"  Any consecutive injection including prior to discharge from the hospital will make your extremity feel \"numb\"  You may feel an aching or burning when the local anesthesia starts to wear off  Take pain pills as prescribed by your surgeon  Call your surgeon or anesthesiologist if you do not have adequate pain control      "

## 2024-02-26 NOTE — OP REPORT
Date of Surgery:  02/26/24    Pre-operative Diagnosis:  left knee arthritis    Post-operative Diagnosis:  left knee arthritis    Procedure:  left knee replacement using robotic haptic arm assistance (Tadeo)    Implants used:  A [place] Triathlon CR total knee arthroplasty system with the following components  Femur: 4 Left CR  Tibia: 5 Universal  Polyethylene: 10 mm CS  Patella: A35 MB  Fixation: 2 packages Simplex HV    Surgeon:  Chantelle Benton MD    1st Assistant:   Doe Urrutia PA-C    Anesthesiologist:   KAREN Coats DO    EBL:  300 cc    Specimen:  None    Findings:  Tricompartmental arthritic changes; 10 degree flexion contracture    Indications for procedure:  This patient is a 74 y.o. female with a long standing history of left knee arthritis. The patient had failed conservative therapy including anti-inflammatory medications, activity modifications, injections, physical therapy and assistive devices. she was indicated for a left total knee replacement. The patient expressed an understanding of the risks of pain, bleeding, infection, dislocation, malalignment, need for further surgery, damage to surrounding structures, neurovascular compromise, blood clots, leg-length discrepancy both apparent and actual, anesthetic complications, and serious medical consequences including but not limited to heart attack, stroke or even death. It was explained that the implants are man-made products and thus subject to possible failure.  The patient expressed an understanding and wished to proceed. Specific risks based on the patient's medical history were addressed. A clearance was obtained by the medical physicians and the patient was taken to the operating room on the day of surgery for the above named procedure. The patient was felt to be an excellent candidate for our robotic assisted protocols, and the appropriate imaging and pre-operative plans were obtained and verified prior to surgery    Description of  procedure:  The patient was met in the pre-operative holding area. The left knee was marked as the appropriate surgical site with indelible ink. They were taken to the operating room where the anesthesia department started a adductor/general for intraoperative and post-operative anesthesia and pain control. The patient was placed on the OR table and a tourniquet was placed high on the operative thigh. All bony prominences were padded appropriately. The operative knee was prepped and draped in a standard sterile surgical fashion. A time out procedure was called to verify the side and site of surgery, the proposed surgical procedure, and the administration of pre-operative antibiotics. After successful completion of the time out procedure, our attention was turned to the operative knee.  The tourniquet was inflated after exsanguination with an Esmarch bandage. The knee was flexed and a straight incision was made just medial to the midline. The capsule of the knee was cleared and a mid-vastus arthrotomy was performed. The patella was subluxated laterally and a medial release was performed to properly visualize the posteromedial aspect of the tibial plateau. The knee was extended, the patellar tendon was protected and the infrapatellar fat pad was excised. A lateral release was performed. The patella was turned on its side and held in place with two penetrating towel clips. A free-hand patellar cut was made, the patella was sized and the appropriate lug holes were drilled. The patella was subluxed, the knee was flexed. The tourniquet was released. Hemostasis was obtained. Each hemijoint was cleared of soft tissue. The ACL was removed. At this point we placed the trackers and satellite arrays for the robotic-assisted arm. The hip center and the ankle center were verified. Multiple checkpoints from the patient's imaging were correlated intraoperatively. We did balance the ligaments in extension and flexion. Once we had  our plan, the haptic arm was brought in. The saw blade and femoral and tibial checkpoints were verified. We made our cuts taking care to protect the soft tissue structures. The bony pieces were removed.   A large Baker's cyst was evacuated, the laminar spreaders were placed and posterior osteophytes were removed.   A trial knee was constructed and with the 10 mm CS polyethylene we noted full extension without recurvatum and greater than 125 degrees of flexion with appropriate patellar tracking. At this point we removed the trial components and thoroughly irrigated the wound. Osteophytes were removed. The tourniquet was reinflated.  The real components were opened in a sterile fashion on the back table and then implanted into place sequentially, first the tibia, then the femur, then the patella.  The knee was again taken through a range of motion. Again we noted full extension and greater than 125 degrees of flexion with appropriate varus/valgus stability and patellar tracking. Therefore the real polyethylene was opened and inserted into the tibial tray. An audible click was heard as it engaged the tibia. Now a dilute betadine solution  was instilled into the knee for 3 minutes before being pulse-lavaged out. The tourniquet was again released and hemostasis was obtained. The knee was flexed, the arthrotomy was closed with #1 Quill, followed by 2-0 Vicryl in the deep dermal layer in a buried interrupted fashion. The skin was closed with 3-0 monocryl in a running subcuticular fashion, and a sterile Marco/Acticoat dressing was applied. The patient is currently in the operating room awaiting reversal of anesthesia. All sponge, checkpoint and instrument counts were correct at the end of the case.  A front wheel walker will be provided and will be required in the perioperative period to assist with balance, weakness, and pain during ambulation, and to help prevent falls.

## 2024-02-26 NOTE — PROGRESS NOTES
Received report from PACU nurse, Max. Assumed pt care at 1415. Pt is  resting comfortably in bed. Pt on 2L, NC. No signs of SOB/respiratory distress. Labs, VS, medications reviewed.  Fall precautions in place. Bed at lowest position. Call light and personal belongings within reach. Plan of care discussed, no further concerns at this time. Welcome guide provided. Family

## 2024-02-26 NOTE — PROGRESS NOTES
4 Eyes Skin Assessment Completed by KATHLEEN Villafuerte and KATHLEEN Martinez.    Head WDL  Ears WDL  Nose WDL  Mouth WDL  Neck WDL  Breast/Chest WDL  Shoulder Blades WDL  Spine WDL  (R) Arm/Elbow/Hand WDL  (L) Arm/Elbow/Hand WDL  Abdomen WDL  Groin WDL  Scrotum/Coccyx/Buttocks WDL  (R) Leg WDL  (L) Leg Bruising  (R) Heel/Foot/Toe Redness, Blanching, and EdemaDry & Flaky  (L) Heel/Foot/Toe Redness, Blanching, and EdemaDry & Flaky          Devices In Places Blood Pressure Cuff, Pulse Ox, SCD's, and Nasal Cannula      Interventions In Place NC W/Ear Foams and Pillows    Possible Skin Injury No    Pictures Uploaded Into Epic N/A  Wound Consult Placed N/A  RN Wound Prevention Protocol Ordered No

## 2024-02-26 NOTE — THERAPY
Physical Therapy   Initial Evaluation     Patient Name: Mini Pham  Age:  74 y.o., Sex:  female  Medical Record #: [id number]  Today's Date: 2/26/2024     Precautions  Precautions: Weight Bearing As Tolerated Left Lower Extremity  Comments: L knee buckling due to nerve block    Assessment  Patient is 74 y.o. female s/p L TKA POD#0. Pt is experiencing L knee buckling due to nerve block and is unable to ambulate safely at this time. Recommend continued PT in am to review HEP and progress gait as able.    Plan    Physical Therapy Initial Treatment Plan   Treatment Plan : Bed Mobility, Gait Training, Neuro Re-Education / Balance, Stair Training, Therapeutic Activities, Therapeutic Exercise  Treatment Frequency: Daily  Duration: Until Therapy Goals Met    DC Equipment Recommendations: Front-Wheel Walker  Discharge Recommendations: Recommend outpatient physical therapy services to address higher level deficits        02/26/24 3794   Prior Living Situation   Housing / Facility 1 Story House   Steps Into Home 1   Steps In Home 0   Equipment Owned Grab Bar(s) By Toilet;Raised Toilet Seat Without Arms   Lives with - Patient's Self Care Capacity Sibling   Comments 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 A friend will be staying with pt to assist with pts sister and pts needs   Prior Level of Functional Mobility   Bed Mobility Independent   Transfer Status Independent   Ambulation Independent   Assistive Devices Used None   Stairs Independent   Cognition    Cognition / Consciousness WDL   Active ROM Lower Body    Active ROM Lower Body  X   Comments L knee flex ~75 deg   Strength Lower Body   Lower Body Strength  X   Comments L quad weakness/ poor muscle recruitment   Sensation Lower Body   Lower Extremity Sensation   WDL   Balance Assessment   Sitting Balance (Static) Good   Sitting Balance (Dynamic) Fair +   Standing Balance (Static) Fair -   Standing Balance (Dynamic) Poor +   Weight Shift Sitting Fair   Weight Shift  Standing Poor   Comments stdg with FWW   Bed Mobility    Supine to Sit Supervised   Sit to Supine Supervised   Gait Analysis   Gait Level Of Assist Unable to Participate   Comments Pt took a few steps, L knee bucling with poor quad control, deferred further gait   Functional Mobility   Sit to Stand Contact Guard Assist   Bed, Chair, Wheelchair Transfer Unable to Participate   Short Term Goals    Short Term Goal # 1 Pt will be able to perform bed mobility and sup <> sit Purvi in 6 visits.   Short Term Goal # 2 Pt will be able to perform sit <>stand and transfer Purvi in 6 visits.   Short Term Goal # 3 Pt will be able to ambulate 150 ft with FWW Purvi in 6 visits.   Short Term Goal # 4 Pt will be able to go up/down 1 step with FWW SBA in 6 visits.

## 2024-02-26 NOTE — OR NURSING
Pt allergies and NPO status verified. Home medications reconciled, preferred pharmacy verified. Belongings secured in locker. Pt verbalizes understanding of pain scale, expected course of stay, and plan of care. Surgical site verified with pt and MD. Triple aim completed. IV access established. All questions answered. Bed in lowest position, call light within reach.  MD Coats aware of initial and rechecked BP.

## 2024-02-26 NOTE — ANESTHESIA PREPROCEDURE EVALUATION
Case: [id number] Date/Time: 02/26/24 1045    Procedure: LEFT TOTAL KNEE ARTHROPLASTY    Diagnosis: Degenerative arthritis of left knee [M17.12]    Pre-op diagnosis: Degenerative arthritis of left knee [M17.12]    Location: [place] [address] / SURGERY [facility]    Surgeons: Chantelle Benton M.D.            Relevant Problems   CARDIAC   (positive) Essential hypertension      Other   (positive) Degenerative arthritis of left knee   (positive) LUCY (generalized anxiety disorder)       Physical Exam    Airway   Mallampati: II  TM distance: >3 FB  Neck ROM: full       Cardiovascular - normal exam  Rhythm: regular  Rate: normal  (-) murmur     Dental - normal exam           Pulmonary - normal exam  Breath sounds clear to auscultation     Abdominal    Neurological - normal exam                   Anesthesia Plan    ASA 2       Plan - general and peripheral nerve block     Peripheral nerve block will be post-op pain control  Airway plan will be LMA          Induction: intravenous    Postoperative Plan: Postoperative administration of opioids is intended.    Pertinent diagnostic labs and testing reviewed    Informed Consent:    Anesthetic plan and risks discussed with patient.    Use of blood products discussed with: patient whom consented to blood products.

## 2024-02-26 NOTE — LETTER
January 26, 2024    Patient Name: Mini Pham  Surgeon Name: Chantelle Benton M.D.  Surgery Facility: [facility] ([id number] [facility])  Surgery Date: 2/26/2024    The time of your surgery is not final and may change up to and until the day of your surgery. You will be contacted 24-48 hours prior to your surgery date with your check-in and surgery time.    If you will not be at one of the below numbers please call the surgery scheduler at [phone]  Preferred Phone: [phone]    BEFORE YOUR SURGERY   Pre Registration and/or Lab Work must be done within and no earlier than 28 days prior to your surgery date. Your scheduled facility will contact you regarding all required preregistration and/or lab work. If you have not been contacted within 7 days of your scheduled procedure please call [facility] at [phone] for an appointment as soon as possible.    Pre op Appointment:   Date: 2/22/2024   Time: 10:00AM   Provider: Chantelle Benton MD   Location: [address] [place] [id number]  Instructions: Bring a list of all medications you are taking including the dosing and frequency.    - Please  your non-narcotic prescriptions prior to surgery at the pharmacy you provided us. DON'T START them until after your surgery.    DAY OF YOUR SURGERY  Nothing to eat or drink after midnight     Refrain from smoking any substance after midnight prior to surgery. Smoking may interfere with the anesthetic and frequently produces nausea during the recovery period.    Continue taking all lifesaving medications. Including the morning of your surgery with small sip of water.    Please do NOT take on the day of surgery:  Diuretics: examples- furosemide (Lasix), spironolactone, hydrochlorothiazide  ACE-inhibitors: examples- lisinopril, ramipril, enalapril  “ARBs”: examples- losartan, Olmesartan, valsartan    Please arrive at the hospital/surgery center at the  check-in time provided.     An adult will need to bring you and take you home after your surgery.     AFTER YOUR SURGERY  Post op Appointment:   Date: 3/7/2024   Time: 9:30AM   With: Chantelle Benton MD   Location: [facility] [place] [address]    - Therapy- Your first appointment should be 1  week(s) after your surgery. For your convenience we have 4 Physical Therapy locations: [place], [facility], [place], and [facility]. Call our office ASAP to schedule an appointment at [phone] or take the enclosed Therapy Prescription to a facility of your choice.  - No dental work for 3-6 months after your surgery.  - You must have someone provide transportation post surgery and someone to monitor you for at least 24 hours post-surgery. If you don't have either of these your appointment will be canceled.     TIME OFF WORK  FMLA or Disability forms can be faxed directly to: [phone] or you may drop them off at [facility] [place] [facility] [address]. Our office charges a $35.00 fee per form. Forms will be completed within 10 business days of drop off and payment received. For the status of your forms you may contact our disability office directly [phone].    MEDICATION INSTRUCTIONS **Please read section completely**  The following medications should be stopped a minimum of 10 days prior to surgery:  All over the counter, Supplements & Herbal medications    Anorectics: Phentermine (Adipex-P, Lomaira and Suprenza), Phentermine-topiramate (Qsymia), Bupropion-naltrexone (Contrave)    **If you are on Bupropion for anxiety/depression, please continue this medication up until the day of surgery.     Opiod Partial Agonists/Opioid Antagonists: Buprenorphine (Suboxone, Belbuca, Butrans, Probuphine Implant, Sublocade), Naltrexone (ReVia, Vivitrol), Naloxone    Amphetamines: Dextroamphetamine/Amphetamine (Adderall, Mydayis), Methylphenidate Hydrochloride (Concerta, Metadate, Methylin, Ritalin)    The following  medications should be stopped 5 days prior to surgery:  Blood Thinners: Any Aspirin, Aspirin products, anti-inflammatories such as ibuprofen and any blood thinners such as Coumadin and Plavix. Please consult your prescribing physician if you are on life saving blood thinners, in regards to when to stop medications prior to surgery.     The following medications should be stopped a minimum of 3 days prior to surgery:  PDE-5 inhibitors: Sildenafil (Viagra), Tadalafil (Cialis), Vardenafil (Levitra), Avanafil (Stendra)    MAO Inhibitors: Rasagiline (Azilect), Selegiline (Eldepryl, Emsam, Selapar), Isocarboxazid (Marplan), Phenelzine (Nardil)

## 2024-02-26 NOTE — ANESTHESIA PROCEDURE NOTES
Peripheral Block    Date/Time: 2/26/2024 11:02 AM    Performed by: Pablito Coats D.O.  Authorized by: Pablito Coats D.O.    Patient Location:  Pre-op  Start Time:  2/26/2024 11:02 AM  End Time:  2/26/2024 11:06 AM  Reason for Block: at surgeon's request and post-op pain management ONLY    patient identified, IV checked, site marked, risks and benefits discussed, surgical consent, monitors and equipment checked, pre-op evaluation and timeout performed    Patient Position:  Supine  Prep: ChloraPrep    Monitoring:  Heart rate, continuous pulse ox and cardiac monitor  Block Region:  Lower Extremity  Lower Extremity - Block Type:  Selective FEMORAL nerve block at the Adductor Canal    Laterality:  Left  Procedures: ultrasound guided  Image captured, interpreted and electronically stored.  Local Infiltration:  Lidocaine  Strength:  1 %  Dose:  3 ml  Block Type:  Single-shot  Needle Length:  100mm  Needle Gauge:  21 G  Needle Localization:  Ultrasound guidance  Ultrasound picture in chart  Injection Assessment:  Negative aspiration for heme, no paresthesia on injection, incremental injection and local visualized surrounding nerve on ultrasound  Evidence of intravascular injection: No     US Guided Selective Femoral Nerve Block at Adductor Canal:   US probe placed at mid-thigh level on externally rotated leg and femur identified.  Probe directed medially until Sartorius Muscle (SM), Femoral Artery (FA) and Saphenous Nerve (SN) identified in Adductor Canal (AC).  Needle inserted anterolateral to probe in an in plane approach into a subsartorial perivascular perineural position.  After negative aspiration LA injected with ease and visualized spreading within the AC.

## 2024-02-26 NOTE — ANESTHESIA POSTPROCEDURE EVALUATION
Patient: Mini Pham    Procedure Summary       Date: 02/26/24 Room / Location:  OR 06 / SURGERY [facility]    Anesthesia Start: 1123 Anesthesia Stop: 1248    Procedure: LEFT TOTAL KNEE ARTHROPLASTY (Left: Knee) Diagnosis:       Degenerative arthritis of left knee      (Degenerative arthritis of left knee [M17.12])    Surgeons: Chantelle Benton M.D. Responsible Provider: Pablito Coats D.O.    Anesthesia Type: general, peripheral nerve block ASA Status: 2            Final Anesthesia Type: general, peripheral nerve block  Last vitals  BP   Blood Pressure : (!) 149/84 (notifed the nurse)    Temp   36.4 °C (97.5 °F)    Pulse   (!) 110 (notifed the nurse)   Resp   17    SpO2   95 %      Anesthesia Post Evaluation    Patient location during evaluation: PACU  Patient participation: complete - patient participated  Level of consciousness: awake and alert  Pain score: 0    Airway patency: patent  Anesthetic complications: no  Cardiovascular status: hemodynamically stable  Respiratory status: acceptable  Hydration status: euvolemic    PONV: none          There were no known notable events for this encounter.     Nurse Pain Score: 0 (NPRS)

## 2024-02-26 NOTE — ANESTHESIA PROCEDURE NOTES
Airway    Date/Time: 2/26/2024 11:28 AM    Performed by: Pablito Coats D.O.  Authorized by: Pablito Coats D.O.    Location:  OR  Urgency:  Elective  Indications for Airway Management:  Anesthesia      Spontaneous Ventilation: absent    Sedation Level:  Deep  Preoxygenated: Yes    Mask Difficulty Assessment:  0 - not attempted  Final Airway Type:  Supraglottic airway  Final Supraglottic Airway:  Standard LMA    SGA Size:  4  Number of Attempts at Approach:  1

## 2024-02-26 NOTE — ANESTHESIA TIME REPORT
Anesthesia Start and Stop Event Times       Date Time Event    2/26/2024 1120 Ready for Procedure     1123 Anesthesia Start     1248 Anesthesia Stop          Responsible Staff  02/26/24      Name Role Begin End    Pablito Coats D.O. Anesth 1123 1248          Overtime Reason:  no overtime (within assigned shift)    Comments:

## 2024-02-26 NOTE — OR NURSING
1244:  Arrived to PACU. Report received from anesthesia/OR circulator. Patient care assumed.   Dressings: Surgical site CDI. No redness, warmth or swelling noted. Ice pack placed over dressing.  PROMISE flashing green. Will keep in PACU for full hour per [facility] policy    1255:  Med for 3/10 pain, tolerates sips of water    1305:  Med for pain 5/10 and nausea    1315:  Report off to Nunu WANG    1354:  Received report and assumed care.  VSS    1355:  Meets criteria for GSU, report given to Noy WANG

## 2024-02-27 VITALS
OXYGEN SATURATION: 98 % | SYSTOLIC BLOOD PRESSURE: 141 MMHG | WEIGHT: 215.5 LBS | BODY MASS INDEX: 35.9 KG/M2 | TEMPERATURE: 98.6 F | DIASTOLIC BLOOD PRESSURE: 72 MMHG | HEIGHT: 65 IN | RESPIRATION RATE: 16 BRPM | HEART RATE: 76 BPM

## 2024-02-27 LAB
HCT VFR BLD AUTO: 38.5 % (ref 37–47)
HGB BLD-MCNC: 12.4 G/DL (ref 12–16)

## 2024-02-27 PROCEDURE — 97535 SELF CARE MNGMENT TRAINING: CPT

## 2024-02-27 PROCEDURE — 770030 HCHG ROOM/CARE - EXTENDED RECOVERY EACH 15 MIN

## 2024-02-27 PROCEDURE — 94760 N-INVAS EAR/PLS OXIMETRY 1: CPT

## 2024-02-27 PROCEDURE — 85018 HEMOGLOBIN: CPT

## 2024-02-27 PROCEDURE — 97116 GAIT TRAINING THERAPY: CPT

## 2024-02-27 PROCEDURE — 700102 HCHG RX REV CODE 250 W/ 637 OVERRIDE(OP): Performed by: PHYSICIAN ASSISTANT

## 2024-02-27 PROCEDURE — 85014 HEMATOCRIT: CPT

## 2024-02-27 PROCEDURE — 97165 OT EVAL LOW COMPLEX 30 MIN: CPT

## 2024-02-27 PROCEDURE — 36415 COLL VENOUS BLD VENIPUNCTURE: CPT

## 2024-02-27 PROCEDURE — A9270 NON-COVERED ITEM OR SERVICE: HCPCS | Performed by: PHYSICIAN ASSISTANT

## 2024-02-27 PROCEDURE — 97110 THERAPEUTIC EXERCISES: CPT

## 2024-02-27 PROCEDURE — 700111 HCHG RX REV CODE 636 W/ 250 OVERRIDE (IP): Performed by: PHYSICIAN ASSISTANT

## 2024-02-27 PROCEDURE — 97530 THERAPEUTIC ACTIVITIES: CPT

## 2024-02-27 RX ADMIN — KETOROLAC TROMETHAMINE 15 MG: 15 INJECTION, SOLUTION INTRAMUSCULAR; INTRAVENOUS at 06:07

## 2024-02-27 RX ADMIN — DOCUSATE SODIUM 100 MG: 100 CAPSULE, LIQUID FILLED ORAL at 06:06

## 2024-02-27 RX ADMIN — ACETAMINOPHEN 650 MG: 325 TABLET ORAL at 06:06

## 2024-02-27 RX ADMIN — TAMSULOSIN HYDROCHLORIDE 0.4 MG: 0.4 CAPSULE ORAL at 08:36

## 2024-02-27 RX ADMIN — ACETAMINOPHEN 650 MG: 325 TABLET ORAL at 12:13

## 2024-02-27 RX ADMIN — ATORVASTATIN CALCIUM 10 MG: 10 TABLET, FILM COATED ORAL at 06:07

## 2024-02-27 RX ADMIN — ASPIRIN 81 MG: 81 TABLET, COATED ORAL at 06:07

## 2024-02-27 RX ADMIN — KETOROLAC TROMETHAMINE 15 MG: 15 INJECTION, SOLUTION INTRAMUSCULAR; INTRAVENOUS at 12:12

## 2024-02-27 RX ADMIN — AMLODIPINE BESYLATE 10 MG: 5 TABLET ORAL at 06:06

## 2024-02-27 RX ADMIN — CITALOPRAM HYDROBROMIDE 20 MG: 20 TABLET ORAL at 06:06

## 2024-02-27 RX ADMIN — LOSARTAN POTASSIUM 100 MG: 50 TABLET, FILM COATED ORAL at 06:05

## 2024-02-27 ASSESSMENT — COGNITIVE AND FUNCTIONAL STATUS - GENERAL
SUGGESTED CMS G CODE MODIFIER DAILY ACTIVITY: CJ
TOILETING: A LITTLE
PERSONAL GROOMING: A LITTLE
MOVING FROM LYING ON BACK TO SITTING ON SIDE OF FLAT BED: A LITTLE
MOBILITY SCORE: 21
SUGGESTED CMS G CODE MODIFIER MOBILITY: CJ
CLIMB 3 TO 5 STEPS WITH RAILING: A LITTLE
HELP NEEDED FOR BATHING: A LITTLE
MOVING TO AND FROM BED TO CHAIR: A LITTLE
TOILETING: A LITTLE
SUGGESTED CMS G CODE MODIFIER MOBILITY: CK
DAILY ACTIVITIY SCORE: 20
STANDING UP FROM CHAIR USING ARMS: A LITTLE
SUGGESTED CMS G CODE MODIFIER MOBILITY: CK
MOVING FROM LYING ON BACK TO SITTING ON SIDE OF FLAT BED: A LITTLE
SUGGESTED CMS G CODE MODIFIER DAILY ACTIVITY: CJ
TURNING FROM BACK TO SIDE WHILE IN FLAT BAD: A LITTLE
HELP NEEDED FOR BATHING: A LITTLE
MOVING TO AND FROM BED TO CHAIR: A LITTLE
CLIMB 3 TO 5 STEPS WITH RAILING: A LITTLE
DRESSING REGULAR LOWER BODY CLOTHING: A LITTLE
WALKING IN HOSPITAL ROOM: A LITTLE
STANDING UP FROM CHAIR USING ARMS: A LITTLE
DRESSING REGULAR LOWER BODY CLOTHING: A LITTLE
DAILY ACTIVITIY SCORE: 21
MOBILITY SCORE: 17
WALKING IN HOSPITAL ROOM: A LITTLE
WALKING IN HOSPITAL ROOM: A LITTLE
MOBILITY SCORE: 18
TURNING FROM BACK TO SIDE WHILE IN FLAT BAD: A LITTLE
CLIMB 3 TO 5 STEPS WITH RAILING: A LOT
TURNING FROM BACK TO SIDE WHILE IN FLAT BAD: A LITTLE

## 2024-02-27 ASSESSMENT — GAIT ASSESSMENTS
ASSISTIVE DEVICE: FRONT WHEEL WALKER
DEVIATION: ANTALGIC;STEP TO
DEVIATION: DECREASED HEEL STRIKE;DECREASED TOE OFF
GAIT LEVEL OF ASSIST: STANDBY ASSIST
DISTANCE (FEET): 80
DISTANCE (FEET): 15
ASSISTIVE DEVICE: FRONT WHEEL WALKER
GAIT LEVEL OF ASSIST: CONTACT GUARD ASSIST
DISTANCE (FEET): 15

## 2024-02-27 ASSESSMENT — PAIN DESCRIPTION - PAIN TYPE
TYPE: SURGICAL PAIN
TYPE: SURGICAL PAIN

## 2024-02-27 ASSESSMENT — ACTIVITIES OF DAILY LIVING (ADL): TOILETING: INDEPENDENT

## 2024-02-27 NOTE — CARE PLAN
The patient is Stable - Low risk of patient condition declining or worsening    Shift Goals  Clinical Goals: Pt will remain free from falls and injury; Pt will report pain <4/10 after ordered interventions are administered; Pt will remain normotensive with ambulation and remain >90% SpO2 on room air.  Patient Goals: Go home...  Family Goals: Go home    Progress made toward(s) clinical / shift goals:  Pt without falls and injury; Pt pain 0/10; Pt ambulated with PT and RN, no hypotension and SpO2 remained greater than 90%  Patient is not progressing towards the following goals:

## 2024-02-27 NOTE — PROGRESS NOTES
Received report from night shift nurse. Assumed pt care at 0715. Pt is A&Ox4, resting comfortably in bed. Pt on RA. No signs of SOB/respiratory distress. Labs, VS, medications reviewed.  Fall precautions in place. Bed at lowest position. Call light and personal belongings within reach. Plan of care discussed, no further concerns at this time.

## 2024-02-27 NOTE — DISCHARGE SUMMARY
Mini Pham was admitted on 2/26/2024 for Degenerative arthritis of left knee [M17.12]  Arthritis of left knee [M17.12]  Patient was diagnosed with severe degenerative arthritis in the left knee and underwent a left total knee arthroplasty by Dr. Chantelle Benton on the date of admission. Please see dictated operative note for further information.    Hospital course:     The patient has done well, with no complications.  Patient denies chest pain, calf pain or shortness of breath.   Pain is well-controlled at present.  Patient is ambulating well with the use of an assistive device, and progressing in physical therapy.   See progress notes from this admission for patient's neurovascular status  Narcotic dosages were chosen by taking into account the the patient's previous history of opoid use and to ensure proper pain control after surgery    Discharge date: 2-27-24    Patient is being discharged to home after am physical therapy.     Allergies:  Patient has no known allergies.       Medication List        CHANGE how you take these medications        Instructions   amLODIPine 10 MG Tabs  What changed: See the new instructions.  Commonly known as: Norvasc   TAKE 1 TABLET DAILY (DOSE  INCREASE)     losartan 100 MG Tabs  What changed: See the new instructions.  Commonly known as: Cozaar   TAKE 1 TABLET DAILY; DOSE  INCREASE     traMADol 50 MG Tabs  What changed: additional instructions  Commonly known as: Ultram   Take 1 Tablet by mouth every 6 hours as needed for Severe Pain (Alternate with Oxycodone. Not to be taken at the same time.) for up to 10 days. Alternate with Oxycodone  Dose: 50 mg            CONTINUE taking these medications        Instructions   aspirin 81 MG EC tablet  Commonly known as: Aspirin 81   Take 1 Tablet by mouth 2 times a day for 30 days.  Dose: 81 mg     atorvastatin 10 MG Tabs  Commonly known as: Lipitor   Take 1 Tablet by mouth every day.  Dose: 10 mg     citalopram 20 MG Tabs  Commonly known  as: CeleXA   Take 1 Tablet by mouth every day.  Dose: 20 mg     docusate sodium 100 MG Caps  Commonly known as: Colace   Take 1 Capsule by mouth 2 times a day as needed for Constipation (2-3 times daily PRN).  Dose: 100 mg     meloxicam 7.5 MG Tabs  Commonly known as: Mobic   Take 1 Tablet by mouth 2 times a day.  Dose: 7.5 mg     ondansetron 4 MG Tbdp  Commonly known as: Zofran ODT   Take 1 Tablet by mouth every 6 hours as needed for Nausea/Vomiting.  Dose: 4 mg     oxyCODONE immediate-release 5 MG Tabs  Commonly known as: Roxicodone   Take 1 Tablet by mouth every four hours as needed for Severe Pain (For severe post op pain- MAX 6 DAILY) for up to 7 days.  Dose: 5 mg     VITAMIN B 12 PO   Take 2,500 mcg by mouth. Under tongue  Dose: 2,500 mcg     VITAMIN D PO   Take 2,000 Units by mouth.  Dose: 2,000 Units              Discharge Instructions:     Patient is instructed to ambulate and weight bear as tolerated with the use of an assistive device, and to continue physical therapy exercises given during this hospital stay. Strict posterior hip precautions are to be observed for patients who underwent a total hip replacement.   Patient is to ice and elevate the surgical leg regularly, with pillows under the ankle, nothing is to be placed under the knee.   Patient was given detailed wound care instructions, and will leave the Incisional vac or silver dressing on until first post-op visit.   ASA twice daily for DVT prophylaxis.  Patient is to follow up with Dr. Benton's office in 1-2 weeks.

## 2024-02-27 NOTE — THERAPY
"Occupational Therapy   Initial Evaluation     Patient Name: Mini Pham  Age:  74 y.o., Sex:  female  Medical Record #: [id number]  Today's Date: 2/27/2024     Precautions  Precautions: Weight Bearing As Tolerated Right Lower Extremity  Comments: L knee buckling due to nerve block    Assessment  Patient is 74 y.o. female admit for R TKA.  Pt tolerated bed mobility, basic self care tasks, ADL transfers and simple mobility with FWW in the room as expected on POD#1.  Pt demos good safety awareness with transfers and ADL's.  Pt is agreeable to obtaining recommended adaptive equipment for ADl's.  Pt will have assist from friend for a short time at home.  Pt attentive to education as stated below (friend arrived at end of session, not present for full education)..  Pt appears appropriate for discharge home.    Treatment completed this session after initial evaluation completed:  Educated pt on role of OT and Total Knee Replacement Protocol.  Reviewed application of precautions and use of Adaptive Equipment for dressing, bathing, toileting, grooming, (as needed) kitchen activities and general functional mobility in the home.  Reviewed the use of reacher,  shower chair and raised toilet seat to assist with ADL's.  Reviewed stall shower transfers. Provided pt with suggestions on where to obtain needed items. Educated on mgmt of PROMISE post op bandage with dressing and showering.  Pt verbalized understanding of all education provided.      Plan    Occupational Therapy Initial Treatment Plan   Duration: Evaluation only    DC Equipment Recommendations: None  Discharge Recommendations: Anticipate that the patient will have no further occupational therapy needs after discharge from the hospital     Subjective    \"I appreciate all the tips and tricks\"     Objective       02/27/24 1209   Prior Living Situation   Prior Services Home-Independent   Housing / Facility 1 Story House   Steps Into Home 1   Steps In Home 0   Bathroom Set up " Walk In Shower;Shower Chair;Grab Bars   Equipment Owned Raised Toilet Seat Without Arms;Grab Bar(s) By Toilet;Single Point Cane;Hand Held Shower;Sock Aid;Reacher;Long Handled Shoehorn  (walker issued in house)   Lives with - Patient's Self Care Capacity Sibling   Comments 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  Pt will have help from friends/family for the next week to 2 weeks until she is doing well enough to resume her previous tasks   Prior Level of ADL Function   Self Feeding Independent   Grooming / Hygiene Independent   Bathing Independent   Dressing Independent   Toileting Independent   Prior Level of IADL Function   Medication Management Independent   Laundry Independent   Kitchen Mobility Independent   Finances Independent   Home Management Independent   Shopping Independent   Prior Level Of Mobility Independent Without Device in Community   Driving / Transportation Driving Independent   Occupation (Pre-Hospital Vocational) Retired Due To Age   Precautions   Precautions Weight Bearing As Tolerated Left Lower Extremity   Vitals   Pulse 76   Patient BP Position Sitting  (After ambulation, no s/s hypotension)   Blood Pressure  (!) 141/72   Respiration 16   Pulse Oximetry 98 %   O2 (LPM) 0   O2 Delivery Device None - Room Air   Pain 0 - 10 Group   Location Knee   Location Orientation Left   Description Aching   Therapist Pain Assessment During Activity;Nurse Notified   Cognition    Cognition / Consciousness WDL   Comments pleasant attentive   Active ROM Upper Body   Active ROM Upper Body  WDL   Dominant Hand Right   Strength Upper Body   Upper Body Strength  WDL   Coordination Upper Body   Coordination WDL   Balance Assessment   Sitting Balance (Static) Good   Sitting Balance (Dynamic) Good   Standing Balance (Static) Fair   Standing Balance (Dynamic) Fair   Weight Shift Sitting Good   Weight Shift Standing Fair   Comments with FWw in  standing   Bed Mobility    Supine to Sit Supervised   Sit to Supine Supervised   ADL Assessment   Upper Body Dressing Modified Independent   Lower Body Dressing Minimal Assist  (shoes only, able to don undies Zara)   Toileting   (reports SBA with nursing)   How much help from another person does the patient currently need...   Putting on and taking off regular lower body clothing? 3   Bathing (including washing, rinsing, and drying)? 3   Toileting, which includes using a toilet, bedpan, or urinal? 3   Putting on and taking off regular upper body clothing? 4   Taking care of personal grooming such as brushing teeth? 3   Eating meals? 4   6 Clicks Daily Activity Score 20   Functional Mobility   Sit to Stand Contact Guard Assist   Bed, Chair, Wheelchair Transfer Contact Guard Assist   Transfer Method Stand Step   Mobility CGA with FWW   Distance (Feet) 15   # of Times Distance was Traveled 1   Visual Perception   Visual Perception  WDL   Edema / Skin Assessment   Comments PROMISE intact   Activity Tolerance   Sitting Edge of Bed 25 min   Standing 2 min, 3 min   Patient / Family Goals   Patient / Family Goal #1 home   Education Group   Education Provided Role of Occupational Therapist;Activities of Daily Living;Adaptive Equipment   Role of Occupational Therapist Patient Response Patient;Acceptance;Explanation;Verbal Demonstration   ADL Patient Response Patient;Acceptance;Explanation;Verbal Demonstration   Adaptive Equipment Patient Response Patient;Acceptance;Explanation;Verbal Demonstration   Additional Comments see notes

## 2024-02-27 NOTE — THERAPY
Physical Therapy   Daily Treatment     Patient Name: Mini Pham  Age:  74 y.o., Sex:  female  Medical Record #: [id number]  Today's Date: 2/27/2024     Precautions  Precautions: Weight Bearing As Tolerated Left Lower Extremity      Assessment    No knee buckling today however pts BP low with + symptoms while amb, further gait and stair training is deferred, will follow up later this morning as pt is able to participate.    Plan    Treatment Plan Status:    Type of Treatment: Bed Mobility, Gait Training, Neuro Re-Education / Balance, Stair Training, Therapeutic Activities, Therapeutic Exercise  Treatment Frequency: Daily  Treatment Duration: Until Therapy Goals Met    DC Equipment Recommendations: Front-Wheel Walker  Discharge Recommendations: Recommend outpatient physical therapy services to address higher level deficits       02/27/24 0901   Cognition    Cognition / Consciousness WDL   Supine Lower Body Exercise   Supine Lower Body Exercises Yes   Comments HEP reviewed with pt, all questions answered   Sitting Lower Body Exercises   Sitting Lower Body Exercises Yes   Comments seated knee flex x10 reps   Home Exercise Program   Home Exercise Program Patient Able to Complete Home Program Independently, Safely   Balance   Sitting Balance (Static) Good   Sitting Balance (Dynamic) Fair +   Standing Balance (Static) Fair   Standing Balance (Dynamic) Fair   Weight Shift Sitting Fair   Weight Shift Standing Fair   Comments stdg with FWW   Bed Mobility    Supine to Sit Supervised   Sit to Supine Supervised   Gait Analysis   Gait Level Of Assist Contact Guard Assist   Assistive Device Front Wheel Walker   Distance (Feet) 15   # of Times Distance was Traveled 2   Deviation Antalgic;Step To   Comments Gait limited by c/o feeling dizzy. BP checked in seated position after amb  94/50   Functional Mobility   Sit to Stand Contact Guard Assist   Bed, Chair, Wheelchair Transfer Contact Guard Assist   Toilet Transfers Contact Guard  Assist   Activity Tolerance   Standing 2-3 min   Short Term Goals    Short Term Goal # 1 Pt will be able to perform bed mobility and sup <> sit Purvi in 6 visits.   Goal Outcome # 1 Goal met   Short Term Goal # 2 Pt will be able to perform sit <>stand and transfer Puvri in 6 visits.   Goal Outcome # 2 Goal not met   Short Term Goal # 3 Pt will be able to ambulate 150 ft with FWW Purvi in 6 visits.   Goal Outcome # 3 Goal not met   Short Term Goal # 4 Pt will be able to go up/down 1 step with FWW SBA in 6 visits.   Goal Outcome # 4 Goal not met

## 2024-02-27 NOTE — CARE PLAN
The patient is Stable - Low risk of patient condition declining or worsening    Shift Goals  Clinical Goals: Pt will ambulate 50 ft by midnight, patient will void by midnight, pt will remain free from falls throughout shift.  Patient Goals: sleep comfortably    Progress made toward(s) clinical / shift goals:  Pt ambulated 60 ft and voided by midnight, pt remain free from falls throughout shift.    Patient is not progressing towards the following goals:

## 2024-02-27 NOTE — PROGRESS NOTES
Report received from Noy WANG, assumed care of pt. At 1915   POC and medications reviewed with pt. Pt verbalized understanding.   AOx4  Denies pain, SOB, or dizziness at this time.   Safety measures in place.  Hourly rounding in place.

## 2024-02-27 NOTE — PROGRESS NOTES
S:  s/p left TKA  Placed in extended recovery to allow for physical therapy and pain control and recovery from anesthesia  Pain controlled  No N/V  No Chest Pain/SOB  Afebrile  Exam:    NAD A& O x3    RLE: +DF/PF/EHL SILT L4/L5/S1  LLE:  +DF/PF/EHL SILT L4/L5/S1    Plan:  Continue standard plan of care  Weight bearing: as tolerated with walker/cane.  DVT prophylaxis: SCD/Teds + ASA  Dispo: home today

## 2024-02-27 NOTE — THERAPY
Physical Therapy   Daily Treatment     Patient Name: Mini Pham  Age:  74 y.o., Sex:  female  Medical Record #: [id number]  Today's Date: 2/27/2024     Precautions  Precautions: Weight Bearing As Tolerated Right Lower Extremity      Assessment    Pt is able to ambulate safely with FWW, stair training completed, no s/s of low BP. Pt was provided with education on elevation, icing, positioning, and supine/seated therapeutic exercises. HEP handout issued, pt able to return demo all exercises.  Pt has support at home and has no further acute skilled PT needs at this time. Anticipate pt to d/c home once medically clear with recs for FWW use and OP therapy services.     Plan    Treatment Plan Status: Modify Current Treatment Plan (DC PT)    DC Equipment Recommendations: Front-Wheel Walker  Discharge Recommendations: Recommend outpatient physical therapy services to address higher level deficits       02/27/24 1235   Cognition    Comments Pt feeling better, BP improved   Balance   Sitting Balance (Static) Good   Sitting Balance (Dynamic) Fair +   Standing Balance (Static) Fair +   Standing Balance (Dynamic) Fair   Weight Shift Sitting Good   Weight Shift Standing Fair   Skilled Intervention Postural Facilitation;Sequencing;Tactile Cuing;Verbal Cuing   Comments stdg with FWW   Gait Analysis   Gait Level Of Assist Standby Assist   Assistive Device Front Wheel Walker   Distance (Feet) 80   # of Times Distance was Traveled 2   Deviation Decreased Heel Strike;Decreased Toe Off   # of Stairs Climbed 1   Level of Assist with Stairs Contact Guard Assist   Weight Bearing Status WBAT L LE   Functional Mobility   Sit to Stand Standby Assist   Bed, Chair, Wheelchair Transfer Standby Assist   Activity Tolerance   Standing 6-7 min   Short Term Goals    Short Term Goal # 1 Pt will be able to perform bed mobility and sup <> sit Purvi in 6 visits.   Goal Outcome # 1 Goal met   Short Term Goal # 2 Pt will be able to perform sit <>stand and  transfer Purvi in 6 visits.   Goal Outcome # 2 Progressing as expected   Short Term Goal # 3 Pt will be able to ambulate 150 ft with FWW Purvi in 6 visits.   Goal Outcome # 3 Progressing as expected   Short Term Goal # 4 Pt will be able to go up/down 1 step with FWW SBA in 6 visits.   Goal Outcome # 4 Goal met

## 2024-02-27 NOTE — DISCHARGE PLANNING
Case Management Discharge Planning    Admission Date: 2/26/2024  GMLOS:    ALOS: 0    6-Clicks ADL Score: 21  6-Clicks Mobility Score: 21      Anticipated Discharge Dispo: Discharge Disposition: Discharged to home/self care (01)  Discharge Address: [address]  Discharge Contact Phone Number: [phone]    DME Needed: Yes    DME Ordered: Yes    Action(s) Taken: DC Assessment Complete (See below) and Referral(s) sent    LSW met with pt for discharge planning. Pt reported needing FWW. Orders are in, referral sent to [facility] per protocol.     Escalations Completed: None    Medically Clear: Yes    Next Steps: No further needs    Barriers to Discharge: None    Is the patient up for discharge tomorrow: No          Care Transition Team Assessment    LSW met with pt at bedside to complete discharge planning assessment.     Pt reported she currently lives in a one story house with her sister.   Pt stated she has her friend Nia who was taking care of her prior to surgery, and her friend Alpa, who will be coming next week from [place] to help.   Pt's preferred pharmacy is [facility].   Pt reported being independent prior to admission.   Pt reported having hx of anxiety, reported having medication prescribed from PCP.     Information Source  Orientation Level: Oriented X4  Information Given By: Patient  Who is responsible for making decisions for patient? : Patient    Readmission Evaluation  Is this a readmission?: No    Elopement Risk  Legal Hold: No  Ambulatory or Self Mobile in Wheelchair: Yes    Interdisciplinary Discharge Planning  Lives with - Patient's Self Care Capacity: Sibling  Housing / Facility: 1 Story House    Discharge Preparedness  What is your plan after discharge?: Home with help  What are your discharge supports?: Sibling  Prior Functional Level: Ambulatory, Independent with Activities of Daily Living  Difficulity with ADLs: None  Difficulity with IADLs: None    Functional Assesment  Prior  Functional Level: Ambulatory, Independent with Activities of Daily Living    Finances  Financial Barriers to Discharge: No  Prescription Coverage: Yes    Vision / Hearing Impairment  Right Eye Vision: Impaired, Wears Glasses  Left Eye Vision: Impaired, Wears Glasses    Advance Directive  Advance Directive?: None    Psychological Assessment  History of Substance Abuse: None  History of Psychiatric Problems: Yes  Non-compliant with Treatment: No  Newly Diagnosed Illness: No    Discharge Risks or Barriers  Discharge risks or barriers?: No    Anticipated Discharge Information  Discharge Disposition: Discharged to home/self care (01)  Discharge Address: [address] [place] [address]  Discharge Contact Phone Number: [phone]

## 2024-02-28 ENCOUNTER — PHYSICAL THERAPY (OUTPATIENT)
Dept: PHYSICAL THERAPY | Facility: MEDICAL CENTER | Age: 74
End: 2024-02-28
Attending: ORTHOPAEDIC SURGERY
Payer: MEDICARE

## 2024-02-28 DIAGNOSIS — M17.12 PRIMARY OSTEOARTHRITIS OF LEFT KNEE: ICD-10-CM

## 2024-02-28 PROCEDURE — 97110 THERAPEUTIC EXERCISES: CPT

## 2024-02-28 NOTE — OP THERAPY DAILY TREATMENT
Outpatient Physical Therapy  DAILY TREATMENT     [facility]  [address]  [place] [address]  Phone:  [phone]  Fax:  [phone]    Date: 02/28/2024    Patient: Mini Pham  YOB: 1950  MRN: [id number]     Time Calculation    Start time: 0900  Stop time: 0935 Time Calculation (min): 35 minutes         Chief Complaint: Post-Op Pain and Knee Problem    Visit #: 2    SUBJECTIVE:  Pt is reporting to PT s/p L TKA on 2/26/24. Pt reports the last few days have been very hard on her.     OBJECTIVE:  Current objective measures:   Knee PROM flexion (L): 79 degrees  Knee PROM extension (L): -5 degrees  Knee AROM flexion (L): 68 degrees  Knee AROM extension (L): -3 degrees    WOMAC Grand Total: 62.5       Therapeutic Exercises (CPT 96259):     1. Towel roll passive knee extension, 4 mins, HEP    2. Quad sets, x10, HEP    3. AAROM heel slide, x10, HEP    4. AROM heel slide, x10, HEP    5. AAROM knee flexion/extension EOB, x10, HEP      Therapeutic Exercise Summary: Pt was educated today regarding expectations for rehab prognosis, timeline and HEP.       Time-based treatments/modalities:    Physical Therapy Timed Treatment Charges  Therapeutic exercise minutes (CPT 43119): 35 minutes      Pain rating (1-10) before treatment:  4    ASSESSMENT:   Response to treatment: Pt had fair tolerance for today's PT session. Given pt's post op status, updated re-evaluation was performed today, pt has fair prognosis based on subjective and objective findings from today's session. . Pt will continue to benefit from skilled PT to address aforementioned deficits.      PLAN/RECOMMENDATIONS:   Plan for treatment: therapy treatment to continue next visit.  Planned interventions for next visit: continue with current treatment, manual therapy (CPT 31597), neuromuscular re-education (CPT 83680), therapeutic activities (CPT 04187), and therapeutic exercise (CPT  35059).

## 2024-02-28 NOTE — PROGRESS NOTES
Patient discharged per MD order via wheelchair by hospital transport with personal belongings. Pt is in stable condition. Home medications have arrived to bedside. Discharge instructions have been reviewed and signed, pt provided copy. Any questions have been answered and patient verbalizes understanding of education and instructions provided.

## 2024-02-28 NOTE — OP THERAPY PROGRESS SUMMARY
Outpatient Physical Therapy  RE-EVALUATION NOTE      [facility]  [address]  [place] [address]  Phone:  [phone]  Fax:  [phone]    Date of Visit: 02/28/2024    Patient: Mini Pham  YOB: 1950  MRN: [id number]     Referring Provider: Chantelle Benton [place].D.  [address] [place] [address]   Referring Diagnosis Unilateral primary osteoarthritis, left knee [M17.12]     Visit Diagnoses     ICD-10-CM   1. Primary osteoarthritis of left knee  M17.12       Rehab Potential: fair    Progress Report Period: 2/19/24-2/28/24    Functional Assessment Used  WOMAC Grand Total: 62.5       Objective Findings and Assessment:   Objective findings and assessment details: Pt is s/p L TKA. She has fair management of pain currently. There are significant deficits of ROM, strength and functional gait deficits of L knee as a result. Pt will benefit from skilled PT to address current deficits, anticipated episode of care is 8-12 weeks.     Goals:   Short Term Goals:   1. Pt will achieve 40 or lower on WOMAC  2. Pt will be able to ambulate 500 feet with LRAD   3. Pt will achieve 90 degrees of AROM L knee flexion  4. Pt will achieve full AROM L knee extension  Short term goal time span:  2-4 weeks      Long Term Goals:    1. Pt will achieve 20 or lower on WOMAC  2. Pt will achieve 120 degrees of AROM of L knee flexion   3. Pt will be able to ambulate PLOF for 1000 feet or greater  4. Pt will achieve 30 second sit to stand test within age appropriate norms  Long term goal time span:  6-8 weeks    Plan:   Planned therapy interventions:  Neuromuscular Re-education (CPT 50858), Therapeutic Exercise (CPT 94654), Therapeutic Activities (CPT 04305) and Manual Therapy (CPT 45880)  Frequency:  2x week  Duration in weeks:  8  Dictation #1  MRN:[id number]  CSN:[id number] Dictation #2  MRN:[id number]  CSN:[id number]     Referring provider co-signature:  I  have reviewed this plan of care and my co-signature certifies the need for services.     Certification Period: 02/28/2024 to 04/28/24    Physician Signature: ________________________________ Date: ______________

## 2024-03-01 ENCOUNTER — PHYSICAL THERAPY (OUTPATIENT)
Dept: PHYSICAL THERAPY | Facility: MEDICAL CENTER | Age: 74
End: 2024-03-01
Attending: FAMILY MEDICINE
Payer: MEDICARE

## 2024-03-01 DIAGNOSIS — M17.12 PRIMARY OSTEOARTHRITIS OF LEFT KNEE: ICD-10-CM

## 2024-03-01 PROCEDURE — 97010 HOT OR COLD PACKS THERAPY: CPT

## 2024-03-01 PROCEDURE — 97110 THERAPEUTIC EXERCISES: CPT

## 2024-03-01 PROCEDURE — 97140 MANUAL THERAPY 1/> REGIONS: CPT

## 2024-03-02 NOTE — OP THERAPY DAILY TREATMENT
Outpatient Physical Therapy  DAILY TREATMENT     [facility]  [address]  [place] [address]  Phone:  [phone]  Fax:  [phone]    Date: 03/01/2024    Patient: Mini Pham  YOB: 1950  MRN: [id number]     Time Calculation    Start time: 1545  Stop time: 1625 Time Calculation (min): 40 minutes         Chief Complaint: Knee Problem    Visit #: 3    SUBJECTIVE:  Pt reports her pain is not as severe today.           Therapeutic Exercises (CPT 00245):     1. Towel roll passive knee extension, 4 mins, HEP    2. Quad sets, x10, HEP    3. AAROM heel slide, x10, HEP    4. AROM heel slide, x10, HEP    5. AAROM knee flexion/extension EOB, x10, HEP      Therapeutic Exercise Summary: Pt was educated today regarding expectations for rehab prognosis, timeline and HEP.     Therapeutic Treatments and Modalities:     1. Manual Therapy (CPT 69491), L knee, PROM grade 1-2 (flexion and extension)    2. Hot or Cold Pack Therapy (CPT 86863), L knee, 10 mins    Time-based treatments/modalities:    Physical Therapy Timed Treatment Charges  Manual therapy minutes (CPT 85133): 15 minutes  Therapeutic exercise minutes (CPT 03451): 15 minutes      Pain rating (1-10) before treatment:  2    ASSESSMENT:   Response to treatment: Pt had good tolerance for today's PT session. Pt will continue to benefit from skilled PT to address aforementioned deficits.      PLAN/RECOMMENDATIONS:   Plan for treatment: therapy treatment to continue next visit.  Planned interventions for next visit: continue with current treatment, manual therapy (CPT 27236), neuromuscular re-education (CPT 22463), therapeutic activities (CPT 11580), and therapeutic exercise (CPT 65322).

## 2024-03-06 ENCOUNTER — PHYSICAL THERAPY (OUTPATIENT)
Dept: PHYSICAL THERAPY | Facility: MEDICAL CENTER | Age: 74
End: 2024-03-06
Attending: ORTHOPAEDIC SURGERY
Payer: MEDICARE

## 2024-03-06 DIAGNOSIS — M17.12 PRIMARY OSTEOARTHRITIS OF LEFT KNEE: ICD-10-CM

## 2024-03-06 PROCEDURE — 97140 MANUAL THERAPY 1/> REGIONS: CPT

## 2024-03-06 PROCEDURE — 97110 THERAPEUTIC EXERCISES: CPT

## 2024-03-06 NOTE — OP THERAPY DAILY TREATMENT
"  Outpatient Physical Therapy  DAILY TREATMENT     [facility]  [address]  [place] [address]  Phone:  [phone]  Fax:  [phone]    Date: 03/06/2024    Patient: Mini Pham  YOB: 1950  MRN: [id number]     Time Calculation    Start time: 1415  Stop time: 1458 Time Calculation (min): 43 minutes         Chief Complaint: Knee Problem    Visit #: 4    SUBJECTIVE:  1.5 weeks s/p L TKA.  Reports no complications and is happy with her progress so far. Will follow up with Dr. Benton tomorrow. Continues to manage her pain with Tramodol as needed.     OBJECTIVE:      Therapeutic Exercises (CPT 12006):     1. NuStep, L1 x 6 min, seat 9    2. gastroc stretch, slant board x 1 min    3. true stretch knee flexion/extension rocks, x 2 min    4. supine ball roll, x 10, with PT OP (light)    5. Ball bridge, 5\" x 20    6. SAQ, 0# x 20    7. Quad set, NMRE cross referencing the uninvolved LE. 5\" x 10 total    8. SLR, x 10, focus on maintaining quad set    9. Seated HS curl, manual resistance x 10, light due to limited strength    Therapeutic Treatments and Modalities:     1. Manual Therapy (CPT 50205), L knee, Seated flexion and extension PROM with respective mobilizations GIII. Performed in seated and supine.    2. Hot or Cold Pack Therapy (CPT 47992), perfers to ice at home    Time-based treatments/modalities:    Physical Therapy Timed Treatment Charges  Manual therapy minutes (CPT 69542): 15 minutes  Therapeutic exercise minutes (CPT 90997): 28 minutes    ASSESSMENT:   Response to treatment: Pain appears to be well managed and able to tolerate end range stretching with little guarding. AROM 2-82, PROM 0-94 degrees. Was co-yuri HS during quad set, but better able to isolate after re-ed today.    PLAN/RECOMMENDATIONS:   Plan for treatment: therapy treatment to continue next visit.  Planned interventions for next visit: continue with " current treatment.  Continue to restore ROM within TKA expectations. Strength progressions to tolerance to allow eventual weaning from AD.

## 2024-03-08 ENCOUNTER — PHYSICAL THERAPY (OUTPATIENT)
Dept: PHYSICAL THERAPY | Facility: MEDICAL CENTER | Age: 74
End: 2024-03-08
Attending: ORTHOPAEDIC SURGERY
Payer: MEDICARE

## 2024-03-08 DIAGNOSIS — M17.12 PRIMARY OSTEOARTHRITIS OF LEFT KNEE: ICD-10-CM

## 2024-03-08 PROCEDURE — 97140 MANUAL THERAPY 1/> REGIONS: CPT

## 2024-03-08 PROCEDURE — 97110 THERAPEUTIC EXERCISES: CPT

## 2024-03-08 NOTE — OP THERAPY DAILY TREATMENT
"  Outpatient Physical Therapy  DAILY TREATMENT     [facility]  [address]  [place] [address]  Phone:  [phone]  Fax:  [phone]    Date: 03/08/2024    Patient: Mini Pham  YOB: 1950  MRN: [id number]     Time Calculation    Start time: 1030  Stop time: 1109 Time Calculation (min): 39 minutes         Chief Complaint: Knee Problem    Visit #: 5    SUBJECTIVE:  Pt reports her pain has dramatically decreased.    OBJECTIVE:  Current objective measures:   PROM: 0-98  AROM: 0-85          Therapeutic Exercises (CPT 22036):     1. NuStep, L1 x 6 min, seat 9    2. gastroc stretch, x 1 min    3. Quad sets, x12 (5\" holds), HEP    4. supine ball roll, x 10, with PT OP (light)    5. Ball bridge, 5\" x 20    8. SLR, x 10, focus on maintaining quad set    9. Seated HS curl, manual resistance x 10, grade 2-3 resistance      Therapeutic Exercise Summary: Pt was educated today regarding anticipated progression of exercise program given current progress with knee rehab.     Therapeutic Treatments and Modalities:     1. Manual Therapy (CPT 98544), L knee, Supine PROM knee flexion, seated partial flexion with distraction (grade 3)    Time-based treatments/modalities:    Physical Therapy Timed Treatment Charges  Manual therapy minutes (CPT 91802): 10 minutes  Therapeutic exercise minutes (CPT 84192): 29 minutes      Pain rating (1-10) before treatment:  1    ASSESSMENT:   Response to treatment: Pt continues to progress appropriately. Mini will continue to benefit from skilled PT to address aforementioned deficits.     PLAN/RECOMMENDATIONS:   Plan for treatment: therapy treatment to continue next visit.  Planned interventions for next visit: continue with current treatment, manual therapy (CPT 70119), therapeutic activities (CPT 87357), and therapeutic exercise (CPT 86684).         "

## 2024-03-11 ENCOUNTER — APPOINTMENT (OUTPATIENT)
Dept: RADIOLOGY | Facility: MEDICAL CENTER | Age: 74
End: 2024-03-11
Attending: EMERGENCY MEDICINE
Payer: MEDICARE

## 2024-03-11 ENCOUNTER — APPOINTMENT (OUTPATIENT)
Dept: URGENT CARE | Facility: CLINIC | Age: 74
End: 2024-03-11
Payer: MEDICARE

## 2024-03-11 ENCOUNTER — HOSPITAL ENCOUNTER (EMERGENCY)
Facility: MEDICAL CENTER | Age: 74
End: 2024-03-11
Attending: EMERGENCY MEDICINE
Payer: MEDICARE

## 2024-03-11 VITALS
WEIGHT: 214.95 LBS | OXYGEN SATURATION: 96 % | TEMPERATURE: 96.8 F | HEART RATE: 91 BPM | HEIGHT: 65 IN | RESPIRATION RATE: 18 BRPM | SYSTOLIC BLOOD PRESSURE: 168 MMHG | BODY MASS INDEX: 35.81 KG/M2 | DIASTOLIC BLOOD PRESSURE: 92 MMHG

## 2024-03-11 DIAGNOSIS — I48.91 ATRIAL FIBRILLATION WITH RVR (HCC): ICD-10-CM

## 2024-03-11 DIAGNOSIS — R53.1 WEAKNESS: ICD-10-CM

## 2024-03-11 DIAGNOSIS — E86.0 DEHYDRATION: ICD-10-CM

## 2024-03-11 LAB
ALBUMIN SERPL BCP-MCNC: 3.8 G/DL (ref 3.2–4.9)
ALBUMIN/GLOB SERPL: 1.2 G/DL
ALP SERPL-CCNC: 129 U/L (ref 30–99)
ALT SERPL-CCNC: 10 U/L (ref 2–50)
ANION GAP SERPL CALC-SCNC: 15 MMOL/L (ref 7–16)
APPEARANCE UR: CLEAR
AST SERPL-CCNC: 20 U/L (ref 12–45)
BASOPHILS # BLD AUTO: 0.9 % (ref 0–1.8)
BASOPHILS # BLD: 0.09 K/UL (ref 0–0.12)
BILIRUB SERPL-MCNC: 0.3 MG/DL (ref 0.1–1.5)
BILIRUB UR QL STRIP.AUTO: NEGATIVE
BUN SERPL-MCNC: 10 MG/DL (ref 8–22)
CALCIUM ALBUM COR SERPL-MCNC: 8.9 MG/DL (ref 8.5–10.5)
CALCIUM SERPL-MCNC: 8.7 MG/DL (ref 8.4–10.2)
CHLORIDE SERPL-SCNC: 105 MMOL/L (ref 96–112)
CO2 SERPL-SCNC: 19 MMOL/L (ref 20–33)
COLOR UR: YELLOW
CREAT SERPL-MCNC: 0.49 MG/DL (ref 0.5–1.4)
EKG IMPRESSION: NORMAL
EOSINOPHIL # BLD AUTO: 0.16 K/UL (ref 0–0.51)
EOSINOPHIL NFR BLD: 1.6 % (ref 0–6.9)
ERYTHROCYTE [DISTWIDTH] IN BLOOD BY AUTOMATED COUNT: 43.4 FL (ref 35.9–50)
GFR SERPLBLD CREATININE-BSD FMLA CKD-EPI: 100 ML/MIN/1.73 M 2
GLOBULIN SER CALC-MCNC: 3.1 G/DL (ref 1.9–3.5)
GLUCOSE SERPL-MCNC: 128 MG/DL (ref 65–99)
GLUCOSE UR STRIP.AUTO-MCNC: NEGATIVE MG/DL
HCT VFR BLD AUTO: 41.6 % (ref 37–47)
HGB BLD-MCNC: 13.9 G/DL (ref 12–16)
IMM GRANULOCYTES # BLD AUTO: 0.05 K/UL (ref 0–0.11)
IMM GRANULOCYTES NFR BLD AUTO: 0.5 % (ref 0–0.9)
KETONES UR STRIP.AUTO-MCNC: NEGATIVE MG/DL
LEUKOCYTE ESTERASE UR QL STRIP.AUTO: NEGATIVE
LYMPHOCYTES # BLD AUTO: 1.7 K/UL (ref 1–4.8)
LYMPHOCYTES NFR BLD: 17.1 % (ref 22–41)
MCH RBC QN AUTO: 28.4 PG (ref 27–33)
MCHC RBC AUTO-ENTMCNC: 33.4 G/DL (ref 32.2–35.5)
MCV RBC AUTO: 85.1 FL (ref 81.4–97.8)
MICRO URNS: NORMAL
MONOCYTES # BLD AUTO: 0.6 K/UL (ref 0–0.85)
MONOCYTES NFR BLD AUTO: 6 % (ref 0–13.4)
NEUTROPHILS # BLD AUTO: 7.33 K/UL (ref 1.82–7.42)
NEUTROPHILS NFR BLD: 73.9 % (ref 44–72)
NITRITE UR QL STRIP.AUTO: NEGATIVE
NRBC # BLD AUTO: 0 K/UL
NRBC BLD-RTO: 0 /100 WBC (ref 0–0.2)
PH UR STRIP.AUTO: 8 [PH] (ref 5–8)
PLATELET # BLD AUTO: 555 K/UL (ref 164–446)
PMV BLD AUTO: 9 FL (ref 9–12.9)
POTASSIUM SERPL-SCNC: 3.9 MMOL/L (ref 3.6–5.5)
PROT SERPL-MCNC: 6.9 G/DL (ref 6–8.2)
PROT UR QL STRIP: NEGATIVE MG/DL
RBC # BLD AUTO: 4.89 M/UL (ref 4.2–5.4)
RBC UR QL AUTO: NEGATIVE
SODIUM SERPL-SCNC: 139 MMOL/L (ref 135–145)
SP GR UR STRIP.AUTO: 1.01
TROPONIN T SERPL-MCNC: 12 NG/L (ref 6–19)
TROPONIN T SERPL-MCNC: 14 NG/L (ref 6–19)
WBC # BLD AUTO: 9.9 K/UL (ref 4.8–10.8)

## 2024-03-11 PROCEDURE — 700117 HCHG RX CONTRAST REV CODE 255: Performed by: EMERGENCY MEDICINE

## 2024-03-11 PROCEDURE — 84484 ASSAY OF TROPONIN QUANT: CPT

## 2024-03-11 PROCEDURE — A9270 NON-COVERED ITEM OR SERVICE: HCPCS | Performed by: EMERGENCY MEDICINE

## 2024-03-11 PROCEDURE — 36415 COLL VENOUS BLD VENIPUNCTURE: CPT

## 2024-03-11 PROCEDURE — 96374 THER/PROPH/DIAG INJ IV PUSH: CPT | Mod: XU

## 2024-03-11 PROCEDURE — 71275 CT ANGIOGRAPHY CHEST: CPT

## 2024-03-11 PROCEDURE — 99285 EMERGENCY DEPT VISIT HI MDM: CPT

## 2024-03-11 PROCEDURE — 700105 HCHG RX REV CODE 258: Performed by: EMERGENCY MEDICINE

## 2024-03-11 PROCEDURE — 85025 COMPLETE CBC W/AUTO DIFF WBC: CPT

## 2024-03-11 PROCEDURE — 71045 X-RAY EXAM CHEST 1 VIEW: CPT

## 2024-03-11 PROCEDURE — 80053 COMPREHEN METABOLIC PANEL: CPT

## 2024-03-11 PROCEDURE — 93971 EXTREMITY STUDY: CPT | Mod: 26,LT | Performed by: INTERNAL MEDICINE

## 2024-03-11 PROCEDURE — 81003 URINALYSIS AUTO W/O SCOPE: CPT

## 2024-03-11 PROCEDURE — 700102 HCHG RX REV CODE 250 W/ 637 OVERRIDE(OP): Performed by: EMERGENCY MEDICINE

## 2024-03-11 PROCEDURE — 93005 ELECTROCARDIOGRAM TRACING: CPT

## 2024-03-11 PROCEDURE — 700111 HCHG RX REV CODE 636 W/ 250 OVERRIDE (IP): Mod: JZ | Performed by: EMERGENCY MEDICINE

## 2024-03-11 PROCEDURE — 93971 EXTREMITY STUDY: CPT | Mod: LT

## 2024-03-11 PROCEDURE — 93005 ELECTROCARDIOGRAM TRACING: CPT | Performed by: EMERGENCY MEDICINE

## 2024-03-11 RX ORDER — DILTIAZEM HYDROCHLORIDE 5 MG/ML
10 INJECTION INTRAVENOUS ONCE
Status: COMPLETED | OUTPATIENT
Start: 2024-03-11 | End: 2024-03-11

## 2024-03-11 RX ORDER — SODIUM CHLORIDE, SODIUM LACTATE, POTASSIUM CHLORIDE, CALCIUM CHLORIDE 600; 310; 30; 20 MG/100ML; MG/100ML; MG/100ML; MG/100ML
1000 INJECTION, SOLUTION INTRAVENOUS ONCE
Status: COMPLETED | OUTPATIENT
Start: 2024-03-11 | End: 2024-03-11

## 2024-03-11 RX ORDER — METOPROLOL SUCCINATE 25 MG/1
25 TABLET, EXTENDED RELEASE ORAL DAILY
Qty: 30 TABLET | Refills: 0 | Status: SHIPPED | OUTPATIENT
Start: 2024-03-11 | End: 2024-03-12

## 2024-03-11 RX ADMIN — SODIUM CHLORIDE, POTASSIUM CHLORIDE, SODIUM LACTATE AND CALCIUM CHLORIDE 1000 ML: 600; 310; 30; 20 INJECTION, SOLUTION INTRAVENOUS at 17:03

## 2024-03-11 RX ADMIN — DILTIAZEM HYDROCHLORIDE 10 MG: 5 INJECTION INTRAVENOUS at 18:38

## 2024-03-11 RX ADMIN — SODIUM CHLORIDE, POTASSIUM CHLORIDE, SODIUM LACTATE AND CALCIUM CHLORIDE 1000 ML: 600; 310; 30; 20 INJECTION, SOLUTION INTRAVENOUS at 15:30

## 2024-03-11 RX ADMIN — IOHEXOL 64 ML: 350 INJECTION, SOLUTION INTRAVENOUS at 16:14

## 2024-03-11 RX ADMIN — APIXABAN 5 MG: 5 TABLET, FILM COATED ORAL at 19:15

## 2024-03-11 ASSESSMENT — CHA2DS2 SCORE
CHF OR LEFT VENTRICULAR DYSFUNCTION: NO
AGE IN YEARS: 65-74
SEX: FEMALE
DIABETES: NO
DIABETES: NO
HYPERTENSION: YES
CHA2D2S VASC SCORE: 3
CHF OR LEFT VENTRICULAR DYSFUNCTION: NO
HYPERTENSION: YES
CHA2D2S VASC SCORE: 3
VASCULAR DISEASE: NO
SEX: FEMALE
VASCULAR DISEASE: NO
PRIOR STROKE OR TIA OR THROMBOEMBOLISM: NO
PRIOR STROKE OR TIA OR THROMBOEMBOLISM: NO
AGE IN YEARS: 65-74

## 2024-03-11 ASSESSMENT — FIBROSIS 4 INDEX: FIB4 SCORE: 1.07

## 2024-03-11 NOTE — ED TRIAGE NOTES
Pt has good skin signs Radial pulse strong  HR normal with regular rhythm     Pt sent for screening EKG

## 2024-03-11 NOTE — ED PROVIDER NOTES
"  ER Provider Note    Scribed for Manav Howell M.d. by Armaan Kaba. 3/11/2024  3:10 PM    Primary Care Provider: Max Gonzales M.D.    CHIEF COMPLAINT  Chief Complaint   Patient presents with    Weakness     Generalized weakness Feels faint when upright  x 4 d   Denies CP  Possibly SOB  Pt s/p LTKA on 2/26 by Dr Benton  No fever or cough     EXTERNAL RECORDS REVIEWED  Other The patient's records show that she had a total knee surgery done in Feb 2024.  A recent follow up with Dr. Benton shows that the patient is recovering well.     HPI/ROS  LIMITATION TO HISTORY   Select: : None  OUTSIDE HISTORIAN(S):  Friend The patient's friend was present at bedside.     Mini Pham is a 74 y.o. female who presents to the ED complaining of weakness onset 4 days ago. The patient reports that she had a total knee surgery on February 26th 2024. She explains that her knee has felt \"great\" since her surgery but adds that she has been feeling associated light headedness, faint, dizziness, constipation, and generalized weakness for the past few days. Denies chest pain, shortness of breath, nausea, vomiting, diarrhea, dysuria, burning with urination, or any cold like symptoms. She also denies any bleeding from her surgery site or discharge. She notes that she has been drinking plenty of fluids recently. The patient is no longer taking the pain medication that she was prescribed from her surgery and explains that marijuana helps alleviate her pain more. The patient adds that she is \"a pretty anxious person\". The patient denies a history of an atrial fibrillation but notes that she has premature ventricular contractions.     PAST MEDICAL HISTORY  Past Medical History:   Diagnosis Date    Acute nasopharyngitis 12/21/2023    Anemia, pernicious     Anxiety     Arrhythmia     PVC in past    Arthritis 2005    Atrophic gastritis     Bronchitis     High cholesterol 2000    History of iron deficiency anemia 10/18/2023    " Hyperlipidemia     Hypertension     Infectious disease     Intestinal metaplasia of stomach 10/03/2022    Osteoarthritis     Pain 2005    Arthritis pain    Prediabetes     fasting glucose 102    Pregnant 1973    Termination    Tendonitis     chronic, elbows and wrists     SURGICAL HISTORY  Past Surgical History:   Procedure Laterality Date    ARTHROPLASTY, KNEE, ROBOT-ASSISTED Left 2/26/2024    Procedure: LEFT TOTAL KNEE ARTHROPLASTY;  Surgeon: Chantelle Benton M.D.;  Location: SURGERY [facility];  Service: Ortho Robotic    CHOLECYSTECTOMY  2014     FAMILY HISTORY  Family History   Problem Relation Age of Onset    Alzheimer's Disease Mother 50        early onset    Cancer Father 50        laryngeal, mets to bladder    Heart Attack Sister 56    Diabetes Sister     Hyperlipidemia Sister     Depression Sister     Colon Cancer Sister 69    Depression Sister     Cancer Maternal Aunt 50        colon    No Known Problems Maternal Grandmother     Diabetes Maternal Grandfather         late onset    No Known Problems Paternal Grandmother     No Known Problems Paternal Grandfather     Hypertension Neg Hx      SOCIAL HISTORY   reports that she quit smoking about 32 years ago. Her smoking use included cigarettes. She started smoking about 62 years ago. She has a 30 pack-year smoking history. She has never used smokeless tobacco. She reports current alcohol use of about 0.6 oz of alcohol per week. She reports that she does not currently use drugs after having used the following drugs: Cocaine and Marijuana.    CURRENT MEDICATIONS  Previous Medications    AMLODIPINE (NORVASC) 10 MG TAB    TAKE 1 TABLET DAILY (DOSE  INCREASE)    ASPIRIN (ASPIRIN 81) 81 MG EC TABLET    Take 1 Tablet by mouth 2 times a day for 30 days.    ATORVASTATIN (LIPITOR) 10 MG TAB    Take 1 Tablet by mouth every day.    CITALOPRAM (CELEXA) 20 MG TAB    Take 1 Tablet by mouth every day.    CYANOCOBALAMIN (VITAMIN B 12 PO)    Take 2,500 mcg by mouth every day.  "   DOCUSATE SODIUM (COLACE) 100 MG CAP    Take 1 Capsule by mouth 2 times a day as needed for Constipation (2-3 times daily PRN).    LOSARTAN (COZAAR) 100 MG TAB    TAKE 1 TABLET DAILY; DOSE  INCREASE    MELOXICAM (MOBIC) 7.5 MG TAB    Take 1 Tablet by mouth 2 times a day.    TRAMADOL (ULTRAM) 50 MG TAB    Take 1 Tablet by mouth every 6 hours as needed for Severe Pain (Alternate with Oxycodone. Not to be taken at the same time.) for up to 10 days. Alternate with Oxycodone    VITAMIN D PO    Take 1 Tablet by mouth every day.     ALLERGIES  Patient has no known allergies.    PHYSICAL EXAM  BP (!) 88/70   Pulse 75   Temp 35.9 °C (96.7 °F) (Temporal)   Resp 18   Ht 1.651 m (5' 5\")   Wt 97.5 kg (214 lb 15.2 oz)   SpO2 96%   BMI 35.77 kg/m²     Constitutional: Well developed, Well nourished, mild distress.   HENT: Normocephalic, Atraumatic, slightly dry mucous membrane  Eyes: Conjunctiva normal, No discharge.   Cardiovascular: Irregularly irregular rhythm with tachycardic rate. No murmurs, equal pulses.   Pulmonary: Normal breath sounds, No respiratory distress, No wheezing, No rales, No rhonchi.  Chest: No chest wall tenderness or deformity.   Abdomen:Soft, No tenderness, No masses, no rebound, no guarding.   Musculoskeletal: No major deformities noted, No tenderness. Well healing midline anterior surgical incision of the left knee, No erythema or discharge, Able to bend knee to 90 degrees. Left calf has no cords but is twice the size of the right calf   Skin: Warm, Dry, No erythema, No rash.   Neurologic: Alert & oriented x 3, Normal motor function,  No focal deficits noted.  Psychiatric: Affect normal, Judgment normal, Mood normal.     DIAGNOSTIC STUDIES    Labs:   Results for orders placed or performed during the hospital encounter of 03/11/24   CBC with Differential   Result Value Ref Range    WBC 9.9 4.8 - 10.8 K/uL    RBC 4.89 4.20 - 5.40 M/uL    Hemoglobin 13.9 12.0 - 16.0 g/dL    Hematocrit 41.6 37.0 - " 47.0 %    MCV 85.1 81.4 - 97.8 fL    MCH 28.4 27.0 - 33.0 pg    MCHC 33.4 32.2 - 35.5 g/dL    RDW 43.4 35.9 - 50.0 fL    Platelet Count 555 (H) 164 - 446 K/uL    MPV 9.0 9.0 - 12.9 fL    Neutrophils-Polys 73.90 (H) 44.00 - 72.00 %    Lymphocytes 17.10 (L) 22.00 - 41.00 %    Monocytes 6.00 0.00 - 13.40 %    Eosinophils 1.60 0.00 - 6.90 %    Basophils 0.90 0.00 - 1.80 %    Immature Granulocytes 0.50 0.00 - 0.90 %    Nucleated RBC 0.00 0.00 - 0.20 /100 WBC    Neutrophils (Absolute) 7.33 1.82 - 7.42 K/uL    Lymphs (Absolute) 1.70 1.00 - 4.80 K/uL    Monos (Absolute) 0.60 0.00 - 0.85 K/uL    Eos (Absolute) 0.16 0.00 - 0.51 K/uL    Baso (Absolute) 0.09 0.00 - 0.12 K/uL    Immature Granulocytes (abs) 0.05 0.00 - 0.11 K/uL    NRBC (Absolute) 0.00 K/uL   Complete Metabolic Panel (CMP)   Result Value Ref Range    Sodium 139 135 - 145 mmol/L    Potassium 3.9 3.6 - 5.5 mmol/L    Chloride 105 96 - 112 mmol/L    Co2 19 (L) 20 - 33 mmol/L    Anion Gap 15.0 7.0 - 16.0    Glucose 128 (H) 65 - 99 mg/dL    Bun 10 8 - 22 mg/dL    Creatinine 0.49 (L) 0.50 - 1.40 mg/dL    Calcium 8.7 8.4 - 10.2 mg/dL    Correct Calcium 8.9 8.5 - 10.5 mg/dL    AST(SGOT) 20 12 - 45 U/L    ALT(SGPT) 10 2 - 50 U/L    Alkaline Phosphatase 129 (H) 30 - 99 U/L    Total Bilirubin 0.3 0.1 - 1.5 mg/dL    Albumin 3.8 3.2 - 4.9 g/dL    Total Protein 6.9 6.0 - 8.2 g/dL    Globulin 3.1 1.9 - 3.5 g/dL    A-G Ratio 1.2 g/dL   Troponins NOW   Result Value Ref Range    Troponin T 14 6 - 19 ng/L   Troponins in two (2) hours   Result Value Ref Range    Troponin T 12 6 - 19 ng/L   URINALYSIS (UA)    Specimen: Urine   Result Value Ref Range    Color Yellow     Character Clear     Specific Gravity 1.010 <1.035    Ph 8.0 5.0 - 8.0    Glucose Negative Negative mg/dL    Ketones Negative Negative mg/dL    Protein Negative Negative mg/dL    Bilirubin Negative Negative    Nitrite Negative Negative    Leukocyte Esterase Negative Negative    Occult Blood Negative Negative    Micro  Urine Req see below    ESTIMATED GFR   Result Value Ref Range    GFR (CKD-EPI) 100 >60 mL/min/1.73 m 2   EKG   Result Value Ref Range    Report       [facility] Emergency Dept.    Test Date:  2024  Pt Name:    DINH OLMEDO               Department: [facility]  MRN:        [id number]                      Room:  Gender:     Female                       Technician: [id number]  :        1950                   Requested By:ER TRIAGE PROTOCOL  Order #:    [id number]                    Reading MD: JOSE EDWARDS MD    Measurements  Intervals                                Axis  Rate:       130                          P:          0  MA:         0                            QRS:        -24  QRSD:       76                           T:          77  QT:         287  QTc:        422    Interpretive Statements  Atrial fibrillation, rate of 130, leftward axis, no st elevation. New onset  A. fib  Low voltage, precordial leads  LVH with secondary repolarization abnormality  Compared to ECG 2024 09:57:50  Low QRS voltage now present  Early repolarization now present  Sinus rhythm no longer present  Electronica lly Signed On 2024 15:29:04 PDT by JOSE EDWARDS MD       EK Lead EKG interpreted by me as shown above.     Radiology:   The attending emergency physician has independently interpreted the diagnostic imaging associated with this visit and am waiting the final reading from the radiologist.   Preliminary interpretation is a follows: CT of the chest no pulmonary embolism or pneumonia.  Radiologist interpretation:   US-EXTREMITY VENOUS LOWER UNILAT LEFT   Final Result      CT-CTA CHEST PULMONARY ARTERY W/ RECONS   Final Result      No pulmonary embolism detected.            DX-CHEST-PORTABLE (1 VIEW)   Final Result      No evidence of acute cardiopulmonary process.        COURSE & MEDICAL DECISION MAKING     ED Observation Status? No; Patient does not meet criteria for  ED Observation.     INITIAL ASSESSMENT, COURSE AND PLAN  Care Narrative:     3:25 PM - Patient seen and examined at bedside. Discussed plan of care, including an EKG evaluation, imaging, and lab analysis. Will check the patient for any potential blood clots. Patient agrees to the plan of care. The patient will be resuscitated with 2L LR bolus. Ordered for DX-Chest, CT-CTA-Chest Pulmonary Artery w/ Recons, US-Extremity Venous Lower Unilateral Left, EKG, Troponin, CMP, CBC w/Diff, and a UA to evaluate her symptoms. Differential diagnoses include but not limited to: Anemia versus sepsis versus dehydration versus arrhythmia.     4:14 PM - The patient was medicated with Omnipaque 350 mg/mL via IV.     4:45 PM - Patient was reevaluated at bedside. The patient has begun taking fluids and is still tachycardic in the 120.     6:24 PM - The patient was medicated with Cardizem 10 mg injection.    6:50 PM - Patient was reevaluated at bedside. Informed the patient that they will start anticoagulation medication. Patient verbalizes understanding and agreement to this plan of care. Paged cardiology.    6:55 PM - Spoke with Dr. Bryan (cardiology) about the patient's condition. He recommends the patient be discharged with Eliquis and Toprol medications as well as a cardiology referral.     7:08 PM - I reevaluated the patient at bedside. The patient informs me they feel improved following medication and fluid administration. I discussed the patient's diagnostic study results which are reassuring. Explained that the patient will receive outpatient medication prescriptions and a referral to cardiology. I discussed plan for discharge and follow up as outlined below. The patient is stable for discharge at this time and will return for any new or worsening symptoms. Patient verbalizes understanding and support with my plan for discharge.     HYDRATION: Based on the patient's presentation of Tachycardia the patient was given IV fluids. IV  Hydration was used because oral hydration was not adequate alone. Upon recheck following hydration, the patient was improved.     PROBLEM LIST  Problem #1 weakness at this point time I think the patient's weakness is likely secondary to atrial fibrillation with rapid ventricular rate.  Initially patient appeared dehydrated and was given IV fluids with that her blood pressure improved and her heart rate improved but she continued to remain above 100 with a tachycardia.  She is given a single 10 mg dose of diltiazem with that her heart rate is come down to the 80s.  She feels much better no longer has a dizziness.  Discussed the case with cardiology at this point because of a Al Vascor of 3 will start her on anticoagulation.    Problem #2 lightheadedness at this point time I think this is secondary to some dehydration.  Patient was given IV fluids with that her heart rate improved and she felt much better.    DISPOSITION AND DISCUSSIONS  I have discussed management of the patient with the following physicians and DANIAL's:  Dr. Bryan (cardiology)      Escalation of care considered, and ultimately not performed: acute inpatient care management, however at this time, the patient is most appropriate for outpatient management.    Barriers to care at this time, including but not limited to:  None noted .     Decision tools and prescription drugs considered including, but not limited to:  Medication: Eliquis and Toprol XL .  Al Vasc score of 3 making her moderate to high  The patient will return for new or worsening symptoms and is stable at the time of discharge.    The patient is referred to a primary physician for blood pressure management, diabetic screening, and for all other preventative health concerns.    DISPOSITION:  Patient will be discharged home in stable condition.    FOLLOW UP:  Max Gonzales M.D.  [address] [place] [address] [id number] [phone]    Schedule an appointment as soon as possible for  a visit in 1 week      Isidro AGUILAR M.D.  [address]  [facility] [address]  [phone]    Schedule an appointment as soon as possible for a visit in 1 week      OUTPATIENT MEDICATIONS:  New Prescriptions    APIXABAN (ELIQUIS) 5MG TAB    Take 1 Tablet by mouth 2 times a day.    METOPROLOL SR (TOPROL XL) 25 MG TABLET SR 24 HR    Take 1 Tablet by mouth every day.     FINAL DIAGNOSIS  1. Atrial fibrillation with RVR (HCC)    2. Dehydration    3. Weakness      I, Armaan Kaba (Scribe), am scribing for, and in the presence of, NATHAN Vargas*.    Electronically signed by: Armaan Kaba (Scribe), 3/11/2024    Manav BELL M.* personally performed the services described in this documentation, as scribed by Armaan Kaba in my presence, and it is both accurate and complete.       The note accurately reflects work and decisions made by me.  Manav Howell M.D.  3/11/2024  8:44 PM

## 2024-03-12 ENCOUNTER — TELEPHONE (OUTPATIENT)
Dept: VASCULAR LAB | Facility: MEDICAL CENTER | Age: 74
End: 2024-03-12
Payer: MEDICARE

## 2024-03-12 ENCOUNTER — OFFICE VISIT (OUTPATIENT)
Dept: CARDIOLOGY | Facility: MEDICAL CENTER | Age: 74
End: 2024-03-12
Attending: EMERGENCY MEDICINE
Payer: MEDICARE

## 2024-03-12 ENCOUNTER — PHYSICAL THERAPY (OUTPATIENT)
Dept: PHYSICAL THERAPY | Facility: MEDICAL CENTER | Age: 74
End: 2024-03-12
Attending: ORTHOPAEDIC SURGERY
Payer: MEDICARE

## 2024-03-12 VITALS
OXYGEN SATURATION: 98 % | HEART RATE: 67 BPM | WEIGHT: 214 LBS | DIASTOLIC BLOOD PRESSURE: 82 MMHG | RESPIRATION RATE: 15 BRPM | BODY MASS INDEX: 35.65 KG/M2 | SYSTOLIC BLOOD PRESSURE: 154 MMHG | HEIGHT: 65 IN

## 2024-03-12 DIAGNOSIS — I48.0 PAROXYSMAL A-FIB (HCC): ICD-10-CM

## 2024-03-12 DIAGNOSIS — I10 ESSENTIAL HYPERTENSION: ICD-10-CM

## 2024-03-12 DIAGNOSIS — D68.69 HYPERCOAGULABLE STATE DUE TO PAROXYSMAL ATRIAL FIBRILLATION (HCC): ICD-10-CM

## 2024-03-12 DIAGNOSIS — E78.5 DYSLIPIDEMIA: ICD-10-CM

## 2024-03-12 DIAGNOSIS — M17.12 PRIMARY OSTEOARTHRITIS OF LEFT KNEE: ICD-10-CM

## 2024-03-12 DIAGNOSIS — I48.0 HYPERCOAGULABLE STATE DUE TO PAROXYSMAL ATRIAL FIBRILLATION (HCC): ICD-10-CM

## 2024-03-12 LAB — EKG IMPRESSION: NORMAL

## 2024-03-12 PROCEDURE — 3079F DIAST BP 80-89 MM HG: CPT | Performed by: STUDENT IN AN ORGANIZED HEALTH CARE EDUCATION/TRAINING PROGRAM

## 2024-03-12 PROCEDURE — 97140 MANUAL THERAPY 1/> REGIONS: CPT

## 2024-03-12 PROCEDURE — 97110 THERAPEUTIC EXERCISES: CPT

## 2024-03-12 PROCEDURE — 93010 ELECTROCARDIOGRAM REPORT: CPT | Performed by: STUDENT IN AN ORGANIZED HEALTH CARE EDUCATION/TRAINING PROGRAM

## 2024-03-12 PROCEDURE — 3077F SYST BP >= 140 MM HG: CPT | Performed by: STUDENT IN AN ORGANIZED HEALTH CARE EDUCATION/TRAINING PROGRAM

## 2024-03-12 PROCEDURE — 99204 OFFICE O/P NEW MOD 45 MIN: CPT | Mod: 25 | Performed by: STUDENT IN AN ORGANIZED HEALTH CARE EDUCATION/TRAINING PROGRAM

## 2024-03-12 PROCEDURE — 99212 OFFICE O/P EST SF 10 MIN: CPT | Performed by: STUDENT IN AN ORGANIZED HEALTH CARE EDUCATION/TRAINING PROGRAM

## 2024-03-12 PROCEDURE — 93005 ELECTROCARDIOGRAM TRACING: CPT | Performed by: STUDENT IN AN ORGANIZED HEALTH CARE EDUCATION/TRAINING PROGRAM

## 2024-03-12 RX ORDER — CARVEDILOL 3.12 MG/1
3.12 TABLET ORAL 2 TIMES DAILY WITH MEALS
Qty: 180 TABLET | Refills: 3 | Status: SHIPPED | OUTPATIENT
Start: 2024-03-12

## 2024-03-12 ASSESSMENT — ENCOUNTER SYMPTOMS
DIARRHEA: 0
COUGH: 0
WHEEZING: 0
ABDOMINAL PAIN: 0
PND: 0
FEVER: 0
IRREGULAR HEARTBEAT: 0
DYSPNEA ON EXERTION: 0
NEAR-SYNCOPE: 0
VOMITING: 0
FOCAL WEAKNESS: 0
SYNCOPE: 0
WEAKNESS: 0
NAUSEA: 0
NIGHT SWEATS: 0
PALPITATIONS: 0
ORTHOPNEA: 0
SHORTNESS OF BREATH: 0
DIZZINESS: 0

## 2024-03-12 ASSESSMENT — FIBROSIS 4 INDEX: FIB4 SCORE: 0.82

## 2024-03-12 NOTE — PATIENT INSTRUCTIONS
Echocardiogram prior to next visit  Continue Eliquis  Continue your regular blood pressure medications (lsoartan and amlodipine)  Stop metoprolol  Start carvedilol 3.215mg twice daily

## 2024-03-12 NOTE — OP THERAPY DAILY TREATMENT
"  Outpatient Physical Therapy  DAILY TREATMENT     [facility] Therapy  [address]  [place] [address]  Phone:  [phone]  Fax:  [phone]    Date: 03/12/2024    Patient: Mini Pham  YOB: 1950  MRN: [id number]     Time Calculation    Start time: 1117  Stop time: 1158 Time Calculation (min): 41 minutes         Chief Complaint: Knee Problem    Visit #: 6    SUBJECTIVE:  Pt reports she went to the ER, she has a cardiology appt later today to follow up. Pt does report her knee continues to feel good.     OBJECTIVE:  Current objective measures: PROM L knee: 0-100          Therapeutic Exercises (CPT 20704):     1. Nustep, L1 x 6 min, seat 9    2. gastroc stretch, x 1 min    3. Quad sets, x12 (5\" holds), HEP    4. SLR, x 10, focus on maintaining quad set - HEP    5. Ball bridge, 5\" x 20, NT    6. Sit to stand, 2x8, HEP      Therapeutic Exercise Summary: Pt was educated today regarding sit to stand exercise.    Therapeutic Treatments and Modalities:     1. Manual Therapy (CPT 37917), L knee, Supine PROM knee flexion, seated partial flexion with distraction (grade 3)    Time-based treatments/modalities:    Physical Therapy Timed Treatment Charges  Manual therapy minutes (CPT 11333): 10 minutes  Therapeutic exercise minutes (CPT 12236): 30 minutes      Pain rating (1-10) before treatment:  2    ASSESSMENT:   Response to treatment: Pt had good tolerance for today's PT session. Pt continues to progress appropriately. Pt will continue to benefit from skilled PT to address aforementioned deficits.      PLAN/RECOMMENDATIONS:   Plan for treatment: therapy treatment to continue next visit.  Planned interventions for next visit: continue with current treatment, manual therapy (CPT 55395), neuromuscular re-education (CPT 20315), therapeutic activities (CPT 55918), and therapeutic exercise (CPT 49038).         "

## 2024-03-12 NOTE — DISCHARGE INSTRUCTIONS
Take your anticoagulation every 12 hours.  Return the emergency department if you have increasing dizziness, weakness, chest pain shortness of breath or passout.

## 2024-03-12 NOTE — PROGRESS NOTES
Brief Cardiology Note:    I was called to discuss this patients care with Dr. Howell. We discussed The patient's presentation and case.    At this time it was deemed no formal in person cardiology consultation was necessary, however if this changes due to changes in patient condition or abnormal test results, please re-consult cardiology.     Patient has been feeling weak and lightheaded over the past several days.  She presented to the hospital and noted to be tachycardic in A-fib with RVR.  She was given IV fluids with no significant improvement in ventricular rates.  She had extensive workup lower extremity venous and CT PE study which were both negative.  She was given a dose of diltiazem with significant improvement in ventricular rates currently feels much better and asymptomatic    Recommendations:  Given elevated [id number] score of at least 3, recommend initiation of systemic anticoagulation with OAC if no major contraindications.  Can start beta-blocker therapy with metoprolol.  Will need outpatient follow-up with cardiology.    Electronically Signed by:  Isidro Bryan MD, FACC  3/11/2024  6:57 PM

## 2024-03-12 NOTE — TELEPHONE ENCOUNTER
[facility] Pharmac[facility]     Received anticoagulation referral from Dr. Howell on 03/11/24.     Called and spoke to patient. Initial visit scheduled on 03/21/24 at 8:00am.    1st call     Insurance: Medicare  PCP: [facility]  Locations to be seen: Any    If no response by 04/11/24 OR 2 no shows/cancellations, will remove from referral list     [facility]armac[facility] at [phone], fax [phone]    Cruz Villalobos, CarlosD

## 2024-03-12 NOTE — PROGRESS NOTES
Cardiology Initial Consultation Note    Date of note:    3/12/2024    Primary Care Provider: Max Gonzales M.D.  Referring Provider: Manav Howell, *     Patient Name: Mini Pham     YOB: 1950  MRN:              [id number]    Chief Complaint: A-fib    History of Present Illness: Ms. Mini Pham is a 74 y.o. female whose current medical problems include PVCs, dyslipidemia, hypertension who is here for cardiac consultation for A-fib.    The patient was recently in the ER  3/11/2020 for weakness.  EKG in the ER yesterday showed A-fib with heart rate 130s.  The patient was given IV fluids and IV diltiazem.  The patient was started on Eliquis and metoprolol, and discharged to follow-up in cardiology clinic.    The patient presents today to establish care.  The patient reports feeling very well today.  She has felt much better since ER discharge.  She denies any chest pain or shortness of breath on exertion.  No orthopnea, PND, or leg swelling.  No palpitations.  No syncope or presyncopal episodes.    Cardiovascular Risk Factors:  1. Smoking status: Former smoker  2. Type II Diabetes Mellitus: None  Lab Results   Component Value Date/Time    HBA1C 5.2 02/26/2021 09:27 AM    HBA1C 5.5 09/02/2020 08:30 AM     3. Hypertension: On medication  4. Dyslipidemia: On statin  Cholesterol,Tot   Date Value Ref Range Status   05/18/2023 175 100 - 199 mg/dL Final     LDL   Date Value Ref Range Status   05/18/2023 89 <100 mg/dL Final     HDL   Date Value Ref Range Status   05/18/2023 56 >=40 mg/dL Final     Triglycerides   Date Value Ref Range Status   05/18/2023 148 0 - 149 mg/dL Final     5. Family history of early Coronary Artery Disease in a first degree relative (Male less than 55 years of age; Female less than 65 years of age): Sister with MI  6.  Obesity and/or Metabolic Syndrome: Body mass index is 35.61 kg/m².  7. Sedentary lifestyle: Not active but not sedentary    Review of Systems  "  Constitutional: Negative for fever, malaise/fatigue and night sweats.   Cardiovascular:  Negative for chest pain, dyspnea on exertion, irregular heartbeat, leg swelling, near-syncope, orthopnea, palpitations, paroxysmal nocturnal dyspnea and syncope.   Respiratory:  Negative for cough, shortness of breath and wheezing.    Gastrointestinal:  Negative for abdominal pain, diarrhea, nausea and vomiting.   Neurological:  Negative for dizziness, focal weakness and weakness.         All other systems reviewed and are negative.       Current Outpatient Medications   Medication Sig Dispense Refill    carvedilol (COREG) 3.125 MG Tab Take 1 Tablet by mouth 2 times a day with meals. 180 Tablet 3    apixaban (ELIQUIS) 5mg Tab Take 1 Tablet by mouth 2 times a day. 60 Tablet 0    amLODIPine (NORVASC) 10 MG Tab TAKE 1 TABLET DAILY (DOSE  INCREASE) (Patient taking differently: Take 10 mg by mouth every day.) 90 Tablet 3    losartan (COZAAR) 100 MG Tab TAKE 1 TABLET DAILY; DOSE  INCREASE (Patient taking differently: Take 100 mg by mouth every day.) 90 Tablet 3    docusate sodium (COLACE) 100 MG Cap Take 1 Capsule by mouth 2 times a day as needed for Constipation (2-3 times daily PRN). 30 Capsule 0    citalopram (CELEXA) 20 MG Tab Take 1 Tablet by mouth every day. 90 Tablet 3    atorvastatin (LIPITOR) 10 MG Tab Take 1 Tablet by mouth every day. 90 Tablet 3    VITAMIN D PO Take 1 Tablet by mouth every day.      Cyanocobalamin (VITAMIN B 12 PO) Take 2,500 mcg by mouth every day.       No current facility-administered medications for this visit.         No Known Allergies      Physical Exam:  Ambulatory Vitals  BP (!) 154/82 (BP Location: Left arm, Patient Position: Sitting, BP Cuff Size: Adult)   Pulse 67   Resp 15   Ht 1.651 m (5' 5\")   Wt 97.1 kg (214 lb)   SpO2 98%    Oxygen Therapy:  Pulse Oximetry: 98 %  BP Readings from Last 4 Encounters:   03/12/24 (!) 154/82   03/11/24 (!) 168/92   02/27/24 (!) 141/72   10/18/23 132/82 "       Weight/BMI: Body mass index is 35.61 kg/m².  Wt Readings from Last 4 Encounters:   03/12/24 97.1 kg (214 lb)   03/11/24 97.5 kg (214 lb 15.2 oz)   02/26/24 97.7 kg (215 lb 8 oz)   01/18/24 102 kg (225 lb)       General: Well appearing and in no apparent distress  Eyes: nl conjunctiva, no icteric sclera  ENT: normal external appearance of ears, nose, and throat  Neck: no visible JVP,  no carotid bruits  Lungs: normal respiratory effort, CTAB  Heart: RRR, no murmurs, no rubs or gallops,  no edema bilateral lower extremities. No LV/RV heave on cardiac palpatation. + bilateral radial pulses.  + bilateral dp pulses.   Abdomen: soft, non tender, non distended, no masses, normal bowel sounds.  No HSM.  Extremities/MSK: no clubbing, no cyanosis  Neurological: No focal sensory deficits  Psychiatric: Appropriate affect, A/O x 3, intact judgement and insight  Skin: Warm extremities      Lab Data Review:  Lab Results   Component Value Date/Time    CHOLSTRLTOT 175 05/18/2023 07:43 AM    LDL 89 05/18/2023 07:43 AM    HDL 56 05/18/2023 07:43 AM    TRIGLYCERIDE 148 05/18/2023 07:43 AM       Lab Results   Component Value Date/Time    SODIUM 139 03/11/2024 03:25 PM    POTASSIUM 3.9 03/11/2024 03:25 PM    CHLORIDE 105 03/11/2024 03:25 PM    CO2 19 (L) 03/11/2024 03:25 PM    GLUCOSE 128 (H) 03/11/2024 03:25 PM    BUN 10 03/11/2024 03:25 PM    CREATININE 0.49 (L) 03/11/2024 03:25 PM     Lab Results   Component Value Date/Time    ALKPHOSPHAT 129 (H) 03/11/2024 03:25 PM    ASTSGOT 20 03/11/2024 03:25 PM    ALTSGPT 10 03/11/2024 03:25 PM    TBILIRUBIN 0.3 03/11/2024 03:25 PM      Lab Results   Component Value Date/Time    WBC 9.9 03/11/2024 03:25 PM    HEMOGLOBIN 13.9 03/11/2024 03:25 PM     Lab Results   Component Value Date/Time    HBA1C 5.2 02/26/2021 09:27 AM    HBA1C 5.5 09/02/2020 08:30 AM         Cardiac Imaging and Procedures Review:    EKG dated 3/12/2024: My personal interpretation is sinus rhythm, LVH    EKG dated  3/11/2024: My personal interpretation is atrial fibrillation, rate 130    No prior echocardiogram on file      Assessment & Plan     1. Paroxysmal A-fib (HCC)  EKG - Clinic Performed    carvedilol (COREG) 3.125 MG Tab    EC-ECHOCARDIOGRAM COMPLETE W/O CONT      2. Hypercoagulable state due to paroxysmal atrial fibrillation (HCC)  EC-ECHOCARDIOGRAM COMPLETE W/O CONT      3. Essential hypertension  carvedilol (COREG) 3.125 MG Tab    EC-ECHOCARDIOGRAM COMPLETE W/O CONT      4. Dyslipidemia              Shared Medical Decision Making:    Paroxysmal A-fib  Hypercoagulable state due to paroxymal AF  Currently in sinus rhythm.  Likely triggered by recent knee surgery and dehydration.  -[id number] at least 3 (age, female, hypertension)  -Continue Eliquis 5 mg twice daily  -Transition from metoprolol to carvedilol as below  -Obtain echocardiogram to assess LVEF and any structural abnormalities    Hypertension  BP elevated this visit  -Continue losartan and amlodipine  -Stop metoprolol.  Start carvedilol 3.125 mg twice daily for optimal BP control as well as for A-fib    Dyslipidemia  -Continue atorvastatin 10mg daily    All of the patient's excellent questions were answered to the best of my knowledge and to her satisfaction.  It was a pleasure seeing Ms. Mini Pham in my clinic today. Return in about 3 months (around 6/12/2024). Patient is aware to call the cardiology clinic with any questions or concerns.      Anthony Ling MD  [facility]  [facility], [address]  [address] [address]  Phone: [phone]  Fax: [phone]

## 2024-03-14 ENCOUNTER — APPOINTMENT (OUTPATIENT)
Dept: PHYSICAL THERAPY | Facility: MEDICAL CENTER | Age: 74
End: 2024-03-14
Attending: FAMILY MEDICINE
Payer: MEDICARE

## 2024-03-14 ENCOUNTER — PHARMACY VISIT (OUTPATIENT)
Dept: PHARMACY | Facility: MEDICAL CENTER | Age: 74
End: 2024-03-14
Payer: COMMERCIAL

## 2024-03-14 ENCOUNTER — APPOINTMENT (OUTPATIENT)
Dept: MEDICAL GROUP | Facility: MEDICAL CENTER | Age: 74
End: 2024-03-14
Payer: MEDICARE

## 2024-03-15 ENCOUNTER — OFFICE VISIT (OUTPATIENT)
Dept: MEDICAL GROUP | Facility: MEDICAL CENTER | Age: 74
End: 2024-03-15
Payer: MEDICARE

## 2024-03-15 VITALS
HEART RATE: 63 BPM | WEIGHT: 215 LBS | SYSTOLIC BLOOD PRESSURE: 132 MMHG | BODY MASS INDEX: 35.82 KG/M2 | TEMPERATURE: 98 F | HEIGHT: 65 IN | OXYGEN SATURATION: 96 % | RESPIRATION RATE: 18 BRPM | DIASTOLIC BLOOD PRESSURE: 82 MMHG

## 2024-03-15 DIAGNOSIS — Z96.652 PRESENCE OF TOTAL KNEE JOINT PROSTHESIS, LEFT: ICD-10-CM

## 2024-03-15 DIAGNOSIS — F32.5 MAJOR DEPRESSION IN REMISSION (HCC): ICD-10-CM

## 2024-03-15 DIAGNOSIS — F41.1 GAD (GENERALIZED ANXIETY DISORDER): ICD-10-CM

## 2024-03-15 DIAGNOSIS — E66.9 OBESITY, CLASS II, BMI 35-39.9: ICD-10-CM

## 2024-03-15 DIAGNOSIS — Z86.59 HISTORY OF PANIC ATTACKS: ICD-10-CM

## 2024-03-15 DIAGNOSIS — I48.0 PAROXYSMAL A-FIB (HCC): ICD-10-CM

## 2024-03-15 DIAGNOSIS — E78.5 DYSLIPIDEMIA: ICD-10-CM

## 2024-03-15 PROBLEM — M17.12 DEGENERATIVE ARTHRITIS OF LEFT KNEE: Status: RESOLVED | Noted: 2024-01-18 | Resolved: 2024-03-15

## 2024-03-15 PROBLEM — D51.0 PERNICIOUS ANEMIA: Status: RESOLVED | Noted: 2019-02-25 | Resolved: 2024-03-15

## 2024-03-15 PROCEDURE — 3075F SYST BP GE 130 - 139MM HG: CPT | Performed by: FAMILY MEDICINE

## 2024-03-15 PROCEDURE — 3079F DIAST BP 80-89 MM HG: CPT | Performed by: FAMILY MEDICINE

## 2024-03-15 PROCEDURE — 99214 OFFICE O/P EST MOD 30 MIN: CPT | Performed by: FAMILY MEDICINE

## 2024-03-15 RX ORDER — CITALOPRAM 20 MG/1
20 TABLET ORAL DAILY
Qty: 90 TABLET | Refills: 3 | Status: SHIPPED | OUTPATIENT
Start: 2024-03-15

## 2024-03-15 RX ORDER — ATORVASTATIN CALCIUM 10 MG/1
10 TABLET, FILM COATED ORAL DAILY
Qty: 90 TABLET | Refills: 3 | Status: SHIPPED | OUTPATIENT
Start: 2024-03-15

## 2024-03-15 ASSESSMENT — PATIENT HEALTH QUESTIONNAIRE - PHQ9
1. LITTLE INTEREST OR PLEASURE IN DOING THINGS: NOT AT ALL
SUM OF ALL RESPONSES TO PHQ QUESTIONS 1-9: 0
9. THOUGHTS THAT YOU WOULD BE BETTER OFF DEAD, OR OF HURTING YOURSELF: NOT AT ALL
SUM OF ALL RESPONSES TO PHQ9 QUESTIONS 1 AND 2: 0
7. TROUBLE CONCENTRATING ON THINGS, SUCH AS READING THE NEWSPAPER OR WATCHING TELEVISION: NOT AT ALL
3. TROUBLE FALLING OR STAYING ASLEEP OR SLEEPING TOO MUCH: NOT AT ALL
6. FEELING BAD ABOUT YOURSELF - OR THAT YOU ARE A FAILURE OR HAVE LET YOURSELF OR YOUR FAMILY DOWN: NOT AL ALL
4. FEELING TIRED OR HAVING LITTLE ENERGY: NOT AT ALL
2. FEELING DOWN, DEPRESSED, IRRITABLE, OR HOPELESS: NOT AT ALL
8. MOVING OR SPEAKING SO SLOWLY THAT OTHER PEOPLE COULD HAVE NOTICED. OR THE OPPOSITE, BEING SO FIGETY OR RESTLESS THAT YOU HAVE BEEN MOVING AROUND A LOT MORE THAN USUAL: NOT AT ALL
5. POOR APPETITE OR OVEREATING: NOT AT ALL

## 2024-03-15 ASSESSMENT — FIBROSIS 4 INDEX: FIB4 SCORE: 0.82

## 2024-03-15 NOTE — PROGRESS NOTES
Chief Complaint   Patient presents with    Hospital Follow-up     Afib         Subjective:     HPI:   Mini Pham presents today with the followin. Paroxysmal A-fib (HCC)  Patient developed atrial fibrillation after her total knee replacement.  She was thoroughly evaluated in ER and no clot or DVT was found.  She presented with significant weakness.  She is still somewhat weak but is feeling slightly better.  She does still feel ill overall.  She currently has a very good rate control.  She has been on metoprolol and is transitioning to carvedilol per cardiology recommendation.  She is also on amlodipine.  Blood pressure control is fairly good as well.  The plan is to continue her regimen and follow-up with cardiology in a few months.  EKG done recently cardiology did not show any further atrial fibrillation.    2. Presence of total knee joint prosthesis, left  Patient had a left total knee replacement less than a month ago.  She has good range of motion.  She is working with physical therapy.  She is trying not to overdo.  She is now on chronic anticoagulation and cannot use nonsteroidal anti-inflammatory drugs.  She does have a few tramadol left that she will use sparingly as needed only.    3. Dyslipidemia  Patient denies chest pain, chest pressure, palpitations or exertional shortness of breath. Patient denies muscle aches or muscle weakness from the atorvastatin medication. Patient is a former smoker.  She quit over 30 years ago.  Patient takes no aspirin daily.  She is now on chronic anticoagulation with Eliquis.  Patient has no history of myocardial infarction, stroke or PVD.  Patient does have new onset atrial fibrillation after her knee surgery without accompanying MI.  The atorvastatin is renewed.    4. Major depression in remission (HCC)/LUCY (generalized anxiety disorder)/History of panic attacks  Patient is doing quite well on the citalopram in terms of her depression.  She denies intrusive  negative thoughts or thoughts of self-harm.  This has also helped to control her chronic anxiety.  The medication regimen has greatly reduced panic attacks.  The citalopram is renewed.    5. Obesity, Class II, BMI 35-39.9  Patient continues to have very good sustained success with her weight loss regimen.  She is carefully watching what she eats.  She is congratulated on her weight loss.        Patient Active Problem List    Diagnosis Date Noted    Paroxysmal A-fib ([facility]) 03/15/2024    Presence of total knee joint prosthesis, left 03/15/2024    Arthritis of left knee 02/26/2024    History of iron deficiency anemia 10/18/2023    Obesity, Class II, BMI 35-39.9 10/18/2023    Intestinal metaplasia of stomach 10/03/2022    Major depression in remission ([facility]) 10/08/2019    LUCY (generalized anxiety disorder) 10/08/2019    Atrophic gastritis without hemorrhage 10/08/2019    Essential hypertension 02/25/2019    Dyslipidemia 02/25/2019    Primary osteoarthritis of both knees 02/25/2019       Current medicines (including changes today)  Current Outpatient Medications   Medication Sig Dispense Refill    atorvastatin (LIPITOR) 10 MG Tab Take 1 Tablet by mouth every day. 90 Tablet 3    citalopram (CELEXA) 20 MG Tab Take 1 Tablet by mouth every day. 90 Tablet 3    carvedilol (COREG) 3.125 MG Tab Take 1 Tablet by mouth 2 times a day with meals. 180 Tablet 3    apixaban (ELIQUIS) 5mg Tab Take 1 Tablet by mouth 2 times a day. 60 Tablet 0    amLODIPine (NORVASC) 10 MG Tab TAKE 1 TABLET DAILY (DOSE  INCREASE) (Patient taking differently: Take 10 mg by mouth every day.) 90 Tablet 3    losartan (COZAAR) 100 MG Tab TAKE 1 TABLET DAILY; DOSE  INCREASE (Patient taking differently: Take 100 mg by mouth every day.) 90 Tablet 3    docusate sodium (COLACE) 100 MG Cap Take 1 Capsule by mouth 2 times a day as needed for Constipation (2-3 times daily PRN). 30 Capsule 0    VITAMIN D PO Take 1 Tablet by mouth every day.      Cyanocobalamin (VITAMIN B  "12 PO) Take 2,500 mcg by mouth every day.       No current facility-administered medications for this visit.       No Known Allergies    ROS: As per HPI       Objective:     /82   Pulse 63   Temp 36.7 °C (98 °F)   Resp 18   Ht 1.651 m (5' 5\")   Wt 97.5 kg (215 lb)   SpO2 96%  Body mass index is 35.78 kg/m².    Physical Exam:  Constitutional: Well-developed and well-nourished. Not diaphoretic. No distress. Lucid and fluent.  Skin: Skin is warm and dry. No rash noted.  Head: Atraumatic without lesions.  Eyes: Conjunctivae and extraocular motions are normal. Pupils are equal, round, and reactive to light. No scleral icterus.   Ears:  External ears unremarkable.  Neck: Supple, trachea midline. No thyromegaly present. No cervical or supraclavicular lymphadenopathy. No JVD or carotid bruits appreciated  Cardiovascular: Regular rate and rhythm.  Normal S1, S2 without murmur appreciated.  Chest: Effort normal. Clear to auscultation throughout. No adventitious sounds.   Extremities: No cyanosis, clubbing, erythema, nor edema.  Excellent left knee range of motion with mild swelling and heat.  Neurological: Alert and oriented x 3.  No tremor appreciated.  Movements symmetric.  Gait is somewhat slow and antalgic, she is less than a month out from surgery.    Psychiatric:  Behavior, mood, and affect are appropriate.       Assessment and Plan:     74 y.o. female with the following issues:    1. Paroxysmal A-fib (HCC)        2. Presence of total knee joint prosthesis, left        3. Dyslipidemia  atorvastatin (LIPITOR) 10 MG Tab      4. Major depression in remission (HCC)  citalopram (CELEXA) 20 MG Tab      5. LUCY (generalized anxiety disorder)  citalopram (CELEXA) 20 MG Tab      6. History of panic attacks  citalopram (CELEXA) 20 MG Tab      7. Obesity, Class II, BMI 35-39.9  Patient identified as having weight management issue.  Appropriate orders and counseling given.            Followup: Return in about 6 months " (around 9/15/2024), or if symptoms worsen or fail to improve.

## 2024-03-20 ENCOUNTER — PHYSICAL THERAPY (OUTPATIENT)
Dept: PHYSICAL THERAPY | Facility: MEDICAL CENTER | Age: 74
End: 2024-03-20
Attending: ORTHOPAEDIC SURGERY
Payer: MEDICARE

## 2024-03-20 DIAGNOSIS — M17.12 PRIMARY OSTEOARTHRITIS OF LEFT KNEE: ICD-10-CM

## 2024-03-20 PROCEDURE — 97140 MANUAL THERAPY 1/> REGIONS: CPT

## 2024-03-20 PROCEDURE — 97110 THERAPEUTIC EXERCISES: CPT

## 2024-03-20 NOTE — OP THERAPY DAILY TREATMENT
"  Outpatient Physical Therapy  DAILY TREATMENT     [facility] Outpatient Physical Therapy  [address]  [place] [address]  Phone:  [phone]  Fax:  [phone]    Date: 03/20/2024    Patient: Mini Pham  YOB: 1950  MRN: [id number]     Time Calculation    Start time: 0828  Stop time: 0902 Time Calculation (min): 34 minutes         Chief Complaint: Knee Problem    Visit #: 7    SUBJECTIVE:  Pt reports the medications she has been taking have made her feel fatigued. Pt reports she feels her knee continues to do ok, however is noticing some increased L hip pain.     OBJECTIVE:  Current objective measures: PROM L knee 0-99          Therapeutic Exercises (CPT 93499):     1. Nustep, L1 x 6 min, seat 9    2. gastroc stretch, x 1 min, NT    3. AAROM knee flexion with belt, x20, HEP    4. SLR, x 10, focus on maintaining quad set - HEP    5. Ball bridge, 5\" x 20, NT    6. Sit to stand, 2x8, HEP    7. LAQ, 2x8, HEP    8. AROM knee flexion on swiss ball, x20, HEP      Therapeutic Exercise Summary: Pt was educated today regarding continued emphasis of knee flexion and extension activities at home.     Therapeutic Treatments and Modalities:     1. Manual Therapy (CPT 66576), L knee, Supine PROM knee flexion, seated partial flexion with distraction (grade 3)    Time-based treatments/modalities:    Physical Therapy Timed Treatment Charges  Manual therapy minutes (CPT 58664): 10 minutes  Therapeutic exercise minutes (CPT 06601): 24 minutes      Pain rating (1-10) before treatment:  3    ASSESSMENT:   Response to treatment: Pt had good tolerance for today's PT session. Pt continues to progress appropriately. Pt will continue to benefit from skilled PT to address aforementioned deficits.      PLAN/RECOMMENDATIONS:   Plan for treatment: therapy treatment to continue next visit.  Planned interventions for next visit: continue with current treatment, manual therapy (CPT 10440), " therapeutic activities (CPT 99081), and therapeutic exercise (CPT 43473).

## 2024-03-21 ENCOUNTER — DOCUMENTATION (OUTPATIENT)
Dept: CARDIOLOGY | Facility: MEDICAL CENTER | Age: 74
End: 2024-03-21

## 2024-03-21 ENCOUNTER — ANTICOAGULATION VISIT (OUTPATIENT)
Dept: MEDICAL GROUP | Facility: MEDICAL CENTER | Age: 74
End: 2024-03-21
Payer: MEDICARE

## 2024-03-21 VITALS — OXYGEN SATURATION: 95 % | SYSTOLIC BLOOD PRESSURE: 140 MMHG | HEART RATE: 70 BPM | DIASTOLIC BLOOD PRESSURE: 85 MMHG

## 2024-03-21 DIAGNOSIS — I48.0 PAROXYSMAL A-FIB (HCC): ICD-10-CM

## 2024-03-21 DIAGNOSIS — I48.91 ATRIAL FIBRILLATION WITH RVR (HCC): ICD-10-CM

## 2024-03-21 PROCEDURE — 3079F DIAST BP 80-89 MM HG: CPT | Performed by: FAMILY MEDICINE

## 2024-03-21 PROCEDURE — 3077F SYST BP >= 140 MM HG: CPT | Performed by: FAMILY MEDICINE

## 2024-03-21 PROCEDURE — 99211 OFF/OP EST MAY X REQ PHY/QHP: CPT | Performed by: FAMILY MEDICINE

## 2024-03-21 NOTE — PROGRESS NOTES
Initial anticoag note and most recent cards note reviewed.  Patient with afib and chads vasc = 3    Pending further recommendations, we will continue with indefinite anticoagulation with eliquis as directed by cards    Will defer all management of rhythm, rate, and other cv issues, aside from anticoagulation, to cards Michael Bloch, MD  Anticoagulation Clinic

## 2024-03-21 NOTE — PROGRESS NOTES
NEW DOAC   .  Anticoagulation Summary  As of 3/21/2024      INR goal:     TTR:  --   INR used for dosing:  No new INR was available at the time of this encounter.   Warfarin maintenance plan:  No maintenance plan   Next INR check:     Target end date:  Indefinite    Indications    Paroxysmal A-fib (HCC) [I48.0]                 Anticoagulation Episode Summary       INR check location:      Preferred lab:      Send INR reminders to:      Comments:            Anticoagulation Patient Findings  Patient Findings       Negatives:  Signs/symptoms of thrombosis, Signs/symptoms of bleeding, Laboratory test error suspected, Change in health, Change in alcohol use, Change in activity, Upcoming invasive procedure, Emergency department visit, Upcoming dental procedure, Missed doses, Extra doses, Change in medications, Change in diet/appetite, Hospital admission, Bruising, Other complaints            PCP: Max Gonzales M.D.  Cardiologist: Dr. Ling  Dx: AFib  CHADSVASC = at least 3 (age, sex, HTN)  HAS-BLED = 1 (age)  Target End Date = Indefinite    Pt Hx: The patient was recently in the ER  3/11/2024 for weakness.  EKG in the ER showed A-fib with heart rate 130s. The patient was started on Eliquis and metoprolol (changed to carvedilol per Dr. Ling), and discharged to follow-up in cardiology and anticoagulation clinics. Per cardiology note, pt's Afib was likely triggered by recent knee surgery and dehydration.    Labs:  Lab Results   Component Value Date/Time    WBC 9.9 03/11/2024 03:25 PM    RBC 4.89 03/11/2024 03:25 PM    HEMOGLOBIN 13.9 03/11/2024 03:25 PM    HEMATOCRIT 41.6 03/11/2024 03:25 PM    MCV 85.1 03/11/2024 03:25 PM    MCH 28.4 03/11/2024 03:25 PM    MCHC 33.4 03/11/2024 03:25 PM    MPV 9.0 03/11/2024 03:25 PM    NEUTSPOLYS 73.90 (H) 03/11/2024 03:25 PM    LYMPHOCYTES 17.10 (L) 03/11/2024 03:25 PM    MONOCYTES 6.00 03/11/2024 03:25 PM    EOSINOPHILS 1.60 03/11/2024 03:25 PM    BASOPHILS 0.90 03/11/2024 03:25 PM     HYPOCHROMIA 1+ 10/02/2019 08:42 AM    ANISOCYTOSIS 2+ 02/07/2020 09:32 AM      Lab Results   Component Value Date/Time    SODIUM 139 03/11/2024 03:25 PM    POTASSIUM 3.9 03/11/2024 03:25 PM    CHLORIDE 105 03/11/2024 03:25 PM    CO2 19 (L) 03/11/2024 03:25 PM    GLUCOSE 128 (H) 03/11/2024 03:25 PM    BUN 10 03/11/2024 03:25 PM    CREATININE 0.49 (L) 03/11/2024 03:25 PM          Pt is new to Eliquis and new to [facility]. Discussed:   Indication for DOAC therapy.  Importance of monitoring and compliance.   Monitoring parameters, signs and symptoms of bleeding or clotting.  DOAC therapy, side effects, potential DDIs, OTC medications  Hormonal therapy - None  Pregnancy - None  DDI - Minor increased risk of bleed w/ SSRI, but no current issues. Will CTM.  Pt is NOT on antiplatelet/NSAID therapy.  Lifestyle safety, ie smoking, ETOH, hobby safety, fall safety/prevention  Procedures for missed doses or suspected missed doses, surgeries/procedures, travel, dental work, any medication changes    Pt established on Eliquis 5 mg BID.    DOAC affordable = Yes    Samples provided: No    Labs to be completed prior to next f/u - CBC, CMP    F/U - 3 months    José Miguel Meza, Enzo, BCACP      Added [facility] to care team   CC: Dr. Bloch

## 2024-03-22 ENCOUNTER — PHYSICAL THERAPY (OUTPATIENT)
Dept: PHYSICAL THERAPY | Facility: MEDICAL CENTER | Age: 74
End: 2024-03-22
Attending: FAMILY MEDICINE
Payer: MEDICARE

## 2024-03-22 DIAGNOSIS — M17.12 PRIMARY OSTEOARTHRITIS OF LEFT KNEE: ICD-10-CM

## 2024-03-22 PROCEDURE — 97140 MANUAL THERAPY 1/> REGIONS: CPT

## 2024-03-22 PROCEDURE — 97110 THERAPEUTIC EXERCISES: CPT

## 2024-03-22 NOTE — OP THERAPY DAILY TREATMENT
"  Outpatient Physical Therapy  DAILY TREATMENT     [facility] Outpatient Physical Therapy  [address]  [place] [address]  Phone:  [phone]  Fax:  [phone]    Date: 03/22/2024    Patient: Mini Pham  YOB: 1950  MRN: [id number]     Time Calculation    Start time: 0906  Stop time: 0948 Time Calculation (min): 42 minutes         Chief Complaint: Knee Problem    Visit #: 8    SUBJECTIVE:  Pt reports her knee is feeling much better and she feels overall better and attributes this to acclimating to her medications better.     OBJECTIVE:  Current objective measures:   PROM: 0-101          Therapeutic Exercises (CPT 49074):     1. Nustep, L1 x 6 min, seat 9    3. AAROM knee flexion with belt, x20, HEP    6. Sit to stand, 2x8, HEP    7. LAQ, 2x8, HEP    8. Bike, Rocking (8 mins), ROM      Therapeutic Exercise Summary: Pt was educated today regarding use of stationary bike for \"rocking\" back and forth.     Therapeutic Treatments and Modalities:     1. Manual Therapy (CPT 96941), L knee, Supine PROM knee flexion, seated partial flexion with distraction (grade 3)    Time-based treatments/modalities:    Physical Therapy Timed Treatment Charges  Manual therapy minutes (CPT 56974): 28 minutes  Therapeutic exercise minutes (CPT 86799): 14 minutes      Pain rating (1-10) before treatment:  3    ASSESSMENT:   Response to treatment: Pt had moderate tolerance for today's PT session, her flexion of the L knee is still stiff and restricted, pt was educated today regarding expectations for use of stationary bike \"rocking\" of the knee back and forth.     PLAN/RECOMMENDATIONS:   Plan for treatment: therapy treatment to continue next visit.  Planned interventions for next visit: continue with current treatment, manual therapy (CPT 29058), neuromuscular re-education (CPT 00977), therapeutic activities (CPT 52123), and therapeutic exercise (CPT 88418).         "

## 2024-03-27 ENCOUNTER — PHYSICAL THERAPY (OUTPATIENT)
Dept: PHYSICAL THERAPY | Facility: MEDICAL CENTER | Age: 74
End: 2024-03-27
Attending: ORTHOPAEDIC SURGERY
Payer: MEDICARE

## 2024-03-27 DIAGNOSIS — M17.12 PRIMARY OSTEOARTHRITIS OF LEFT KNEE: ICD-10-CM

## 2024-03-27 PROCEDURE — 97140 MANUAL THERAPY 1/> REGIONS: CPT

## 2024-03-27 PROCEDURE — 97110 THERAPEUTIC EXERCISES: CPT

## 2024-03-27 NOTE — OP THERAPY DAILY TREATMENT
Outpatient Physical Therapy  DAILY TREATMENT     [facility] Outpatient Physical Therapy  [address]  [place] [address]  Phone:  [phone]  Fax:  [phone]    Date: 2024    Patient: Mini Pham  YOB: 1950  MRN: [id number]     Time Calculation    Start time: 0815  Stop time: 0901 Time Calculation (min): 46 minutes         Chief Complaint: Knee Problem    Visit #: 9    SUBJECTIVE:  Pt reports slow improvement, however notes that she continues to have some fatigue related to heart issues she has recently had.     OBJECTIVE:  Current objective measures:   AROM L knee: -3-95  PROM L knee flexion: 104 degrees    TU.41 seconds, 1 LOB (self recovered appropriately) NO AD.           Therapeutic Exercises (CPT 66302):     1. Nustep, L1 x 6 min, seat 9    2. Bike, Rocking (8 mins), NT    3. AAROM knee flexion with belt, x20, HEP    4. LAQ, 2x8, HEP    5. Sit to stand, 2x8, HEP    6. SLR, 2x12, HEP      Therapeutic Exercise Summary: Pt was educated today regarding appropriate gait mechanics.     Therapeutic Treatments and Modalities:     1. Manual Therapy (CPT 57874), L knee, Supine PROM knee flexion, seated partial flexion with distraction (grade 3)    Time-based treatments/modalities:    Physical Therapy Timed Treatment Charges  Manual therapy minutes (CPT 60382): 10 minutes  Therapeutic exercise minutes (CPT 95939): 30 minutes      Pain rating (1-10) before treatment:  2    ASSESSMENT:   Response to treatment: Pt had good tolerance for today's PT session. Pt demonstrates appropriate improvements at current timeline of rehab. Pt will continue to benefit from skilled PT to address aforementioned deficits.      PLAN/RECOMMENDATIONS:   Plan for treatment: therapy treatment to continue next visit.  Planned interventions for next visit: continue with current treatment, gait training (CPT 10688), manual therapy (CPT 49246), neuromuscular re-education (CPT  68506), therapeutic activities (CPT 71826), and therapeutic exercise (CPT 88777).

## 2024-04-01 ENCOUNTER — APPOINTMENT (OUTPATIENT)
Dept: PHYSICAL THERAPY | Facility: MEDICAL CENTER | Age: 74
End: 2024-04-01
Attending: ORTHOPAEDIC SURGERY
Payer: MEDICARE

## 2024-04-01 DIAGNOSIS — M17.12 PRIMARY OSTEOARTHRITIS OF LEFT KNEE: ICD-10-CM

## 2024-04-01 PROCEDURE — 97140 MANUAL THERAPY 1/> REGIONS: CPT

## 2024-04-01 PROCEDURE — 97110 THERAPEUTIC EXERCISES: CPT

## 2024-04-01 NOTE — OP THERAPY PROGRESS SUMMARY
Outpatient Physical Therapy  PROGRESS SUMMARY NOTE      [facility] Outpatient Physical Therapy  [address]  [place] [address]  Phone:  [phone]  Fax:  [phone]    Date of Visit: 04/01/2024    Patient: Mini Pham  YOB: 1950  MRN: [id number]     Referring Provider: Chantelle Benton M.D.  [address] [place] [address]   Referring Diagnosis Unilateral primary osteoarthritis, left knee [M17.12]     Visit Diagnoses     ICD-10-CM   1. Primary osteoarthritis of left knee  M17.12       Rehab Potential: fair    Progress Report Period: 2/28/24-4/1/24    Functional Assessment Used  WOMAC Grand Total: 30.21       Objective Findings and Assessment:   Patient progression towards goals: Short Term Goals:   1. Pt will achieve 40 or lower on WOMAC (achieved)  2. Pt will be able to ambulate 500 feet with LRAD (achieved)  3. Pt will achieve 90 degrees of AROM L knee flexion (achieved)  4. Pt will achieve full AROM L knee extension (not achieved)    Long Term Goals:    1. Pt will achieve 20 or lower on WOMAC (not achieved)  2. Pt will achieve 120 degrees of AROM of L knee flexion (not achieved)  3. Pt will be able to ambulate PLOF for 1000 feet or greater (achieved)  4. Pt will achieve 30 second sit to stand test within age appropriate (not achieved)      Objective findings and assessment details: Pt continues to make very slow progress with her knee flexion ROM. Pt has demonstrated appropriate progression with gait quality, her sit to stands are strong. Pt will continue to benefit from skilled PT to address deficits of strength and ROM of the L knee.       Goals:   Short Term Goals:   1. Pt will achieve 20 or lower on WOMAC  2. Pt will achieve 120 degrees of PROM of L knee flexion  3. Pt will achieve Full extension AROM of L knee  Short term goal time span:  2-4 weeks      Long Term Goals:    1. Pt will achieve 30 second sit to stand test within age  appropriate norms  2. Pt will achieve AROM of L knee 0-120 degrees  Long term goal time span:  6-8 weeks    Plan:   Planned therapy interventions:  Neuromuscular Re-education (CPT 38209), Therapeutic Exercise (CPT 83432), Therapeutic Activities (CPT 01854) and Manual Therapy (CPT 21927)  Frequency:  2x week  Duration in weeks:  8      Referring provider co-signature:  I have reviewed this plan of care and my co-signature certifies the need for services.     Certification Period: 04/01/2024 to 05/31/24    Physician Signature: ________________________________ Date: ______________

## 2024-04-01 NOTE — OP THERAPY DAILY TREATMENT
Outpatient Physical Therapy  DAILY TREATMENT     [facility] Outpatient Physical Therapy  [address]  [place] [address]  Phone:  [phone]  Fax:  [phone]    Date: 04/01/2024    Patient: Mini Pham  YOB: 1950  MRN: [id number]     Time Calculation    Start time: 1030  Stop time: 1111 Time Calculation (min): 41 minutes         Chief Complaint: Knee Problem    Visit #: 10    SUBJECTIVE:  Pt reports she continues to have increased fatigue secondary to cardiovascular problems.    OBJECTIVE:  Current objective measures:     AROM L knee: -1-98  PROM L knee: 0-104  WOMAC Grand Total: 30.21       Therapeutic Exercises (CPT 35836):     1. Nustep, L1 x 6 min, seat 8    2. Bike, Rocking (8 mins)    3. AAROM knee flexion with belt, x20, HEP    5. Sit to stand, 2x8, HEP    6. SLR, 2x12, HEP    7. TKE, MTB 2x8, HEP      Therapeutic Exercise Summary: Pt was educated today regarding progress, emphasis on L knee ROM activity.     Therapeutic Treatments and Modalities:     1. Manual Therapy (CPT 74344), L knee, Supine PROM L knee flexion, overpressure PROM L knee extension    Time-based treatments/modalities:    Physical Therapy Timed Treatment Charges  Manual therapy minutes (CPT 70522): 10 minutes  Therapeutic exercise minutes (CPT 76602): 31 minutes      Pain rating (1-10) before treatment:  2    ASSESSMENT:   Response to treatment: Pt continues to make very slow progress with her knee flexion ROM. Pt has demonstrated appropriate progression with gait quality, her sit to stands are strong. Pt will continue to benefit from skilled PT to address deficits of strength and ROM of the L knee.     PLAN/RECOMMENDATIONS:   Plan for treatment: therapy treatment to continue next visit.  Planned interventions for next visit: continue with current treatment, manual therapy (CPT 92263), neuromuscular re-education (CPT 60608), therapeutic activities (CPT 27193), and therapeutic  exercise (CPT 98843).

## 2024-04-03 ENCOUNTER — ANCILLARY PROCEDURE (OUTPATIENT)
Dept: CARDIOLOGY | Facility: MEDICAL CENTER | Age: 74
End: 2024-04-03
Attending: STUDENT IN AN ORGANIZED HEALTH CARE EDUCATION/TRAINING PROGRAM
Payer: MEDICARE

## 2024-04-03 ENCOUNTER — PHYSICAL THERAPY (OUTPATIENT)
Dept: PHYSICAL THERAPY | Facility: MEDICAL CENTER | Age: 74
End: 2024-04-03
Attending: ORTHOPAEDIC SURGERY
Payer: MEDICARE

## 2024-04-03 DIAGNOSIS — M17.12 PRIMARY OSTEOARTHRITIS OF LEFT KNEE: ICD-10-CM

## 2024-04-03 DIAGNOSIS — I48.0 PAROXYSMAL A-FIB (HCC): ICD-10-CM

## 2024-04-03 DIAGNOSIS — I48.0 HYPERCOAGULABLE STATE DUE TO PAROXYSMAL ATRIAL FIBRILLATION (HCC): ICD-10-CM

## 2024-04-03 DIAGNOSIS — I10 ESSENTIAL HYPERTENSION: ICD-10-CM

## 2024-04-03 DIAGNOSIS — D68.69 HYPERCOAGULABLE STATE DUE TO PAROXYSMAL ATRIAL FIBRILLATION (HCC): ICD-10-CM

## 2024-04-03 LAB
LV EJECT FRACT MOD 2C 99903: 49
LV EJECT FRACT MOD 4C 99902: 61.07
LV EJECT FRACT MOD BP 99901: 56.12

## 2024-04-03 PROCEDURE — 93306 TTE W/DOPPLER COMPLETE: CPT

## 2024-04-03 PROCEDURE — 93306 TTE W/DOPPLER COMPLETE: CPT | Mod: 26 | Performed by: STUDENT IN AN ORGANIZED HEALTH CARE EDUCATION/TRAINING PROGRAM

## 2024-04-03 PROCEDURE — 97530 THERAPEUTIC ACTIVITIES: CPT

## 2024-04-03 PROCEDURE — 97110 THERAPEUTIC EXERCISES: CPT

## 2024-04-03 NOTE — OP THERAPY DAILY TREATMENT
Outpatient Physical Therapy  DAILY TREATMENT     [facility] Outpatient Physical Therapy  [address]  [place] [address]  Phone:  [phone]  Fax:  [phone]    Date: 04/03/2024    Patient: Mini Pham  YOB: 1950  MRN: [id number]     Time Calculation    Start time: 0815  Stop time: 0855 Time Calculation (min): 40 minutes         Chief Complaint: Knee Problem    Visit #: 11    SUBJECTIVE:  Pt reports she is feeling better today.     OBJECTIVE:  Current objective measures:   PROM L knee: -1-109          Therapeutic Exercises (CPT 16625):     1. Nustep, L1 x 6 min, seat 8    2. Bike, Rocking (5 mins)    3. AAROM knee flexion with belt, 2x20, HEP    5. Sit to stand (R foot forward), 2x8, HEP    6. SLR, 2x12, NT    7. TKE, MTB 2x8, NT    8. Step up, x12, HEP    9. Lateral step up, x12, HEP      Therapeutic Exercise Summary: Pt was educated today regarding increasing ROM via bike rocking.    Therapeutic Treatments and Modalities:     1. Manual Therapy (CPT 51201), L knee, Supine PROM L knee flexion, overpressure PROM L knee extension    Time-based treatments/modalities:    Physical Therapy Timed Treatment Charges  Therapeutic activity minutes (CPT 56222): 10 minutes  Therapeutic exercise minutes (CPT 24382): 30 minutes      Pain rating (1-10) before treatment:  1    ASSESSMENT:   Response to treatment: Pt had good tolerance for today's PT session. Pt continues to make improvements with ROM. Pt will continue to benefit from skilled PT to address aforementioned deficits.      PLAN/RECOMMENDATIONS:   Plan for treatment: therapy treatment to continue next visit.  Planned interventions for next visit: continue with current treatment, neuromuscular re-education (CPT 99862), therapeutic activities (CPT 94389), and therapeutic exercise (CPT 12020).

## 2024-04-09 ENCOUNTER — HOSPITAL ENCOUNTER (OUTPATIENT)
Dept: LAB | Facility: MEDICAL CENTER | Age: 74
End: 2024-04-09
Attending: FAMILY MEDICINE
Payer: MEDICARE

## 2024-04-09 DIAGNOSIS — Z86.2 HISTORY OF IRON DEFICIENCY ANEMIA: ICD-10-CM

## 2024-04-09 DIAGNOSIS — I48.0 PAROXYSMAL A-FIB (HCC): ICD-10-CM

## 2024-04-09 DIAGNOSIS — D51.0 PERNICIOUS ANEMIA: ICD-10-CM

## 2024-04-09 DIAGNOSIS — I10 ESSENTIAL HYPERTENSION: ICD-10-CM

## 2024-04-09 DIAGNOSIS — E78.5 DYSLIPIDEMIA: ICD-10-CM

## 2024-04-09 LAB
ALBUMIN SERPL BCP-MCNC: 4.2 G/DL (ref 3.2–4.9)
ALBUMIN/GLOB SERPL: 1.5 G/DL
ALP SERPL-CCNC: 147 U/L (ref 30–99)
ALT SERPL-CCNC: 8 U/L (ref 2–50)
ANION GAP SERPL CALC-SCNC: 12 MMOL/L (ref 7–16)
AST SERPL-CCNC: 11 U/L (ref 12–45)
BILIRUB SERPL-MCNC: 0.3 MG/DL (ref 0.1–1.5)
BUN SERPL-MCNC: 7 MG/DL (ref 8–22)
CALCIUM ALBUM COR SERPL-MCNC: 8.7 MG/DL (ref 8.5–10.5)
CALCIUM SERPL-MCNC: 8.9 MG/DL (ref 8.5–10.5)
CHLORIDE SERPL-SCNC: 105 MMOL/L (ref 96–112)
CHOLEST SERPL-MCNC: 170 MG/DL (ref 100–199)
CO2 SERPL-SCNC: 22 MMOL/L (ref 20–33)
CREAT SERPL-MCNC: 0.48 MG/DL (ref 0.5–1.4)
ERYTHROCYTE [DISTWIDTH] IN BLOOD BY AUTOMATED COUNT: 40 FL (ref 35.9–50)
GFR SERPLBLD CREATININE-BSD FMLA CKD-EPI: 100 ML/MIN/1.73 M 2
GLOBULIN SER CALC-MCNC: 2.8 G/DL (ref 1.9–3.5)
GLUCOSE SERPL-MCNC: 91 MG/DL (ref 65–99)
HCT VFR BLD AUTO: 43.5 % (ref 37–47)
HDLC SERPL-MCNC: 52 MG/DL
HGB BLD-MCNC: 14.3 G/DL (ref 12–16)
IRON SATN MFR SERPL: 20 % (ref 15–55)
IRON SERPL-MCNC: 63 UG/DL (ref 40–170)
LDLC SERPL CALC-MCNC: 91 MG/DL
MCH RBC QN AUTO: 27.9 PG (ref 27–33)
MCHC RBC AUTO-ENTMCNC: 32.9 G/DL (ref 32.2–35.5)
MCV RBC AUTO: 84.8 FL (ref 81.4–97.8)
PLATELET # BLD AUTO: 416 K/UL (ref 164–446)
PMV BLD AUTO: 9.6 FL (ref 9–12.9)
POTASSIUM SERPL-SCNC: 3.9 MMOL/L (ref 3.6–5.5)
PROT SERPL-MCNC: 7 G/DL (ref 6–8.2)
RBC # BLD AUTO: 5.13 M/UL (ref 4.2–5.4)
SODIUM SERPL-SCNC: 139 MMOL/L (ref 135–145)
TIBC SERPL-MCNC: 312 UG/DL (ref 250–450)
TRIGL SERPL-MCNC: 135 MG/DL (ref 0–149)
TSH SERPL DL<=0.005 MIU/L-ACNC: 2.83 UIU/ML (ref 0.38–5.33)
UIBC SERPL-MCNC: 249 UG/DL (ref 110–370)
VIT B12 SERPL-MCNC: 875 PG/ML (ref 211–911)
WBC # BLD AUTO: 5.5 K/UL (ref 4.8–10.8)

## 2024-04-09 PROCEDURE — 83540 ASSAY OF IRON: CPT

## 2024-04-09 PROCEDURE — 83550 IRON BINDING TEST: CPT

## 2024-04-09 PROCEDURE — 80061 LIPID PANEL: CPT

## 2024-04-09 PROCEDURE — 85027 COMPLETE CBC AUTOMATED: CPT

## 2024-04-09 PROCEDURE — 36415 COLL VENOUS BLD VENIPUNCTURE: CPT

## 2024-04-09 PROCEDURE — 82607 VITAMIN B-12: CPT

## 2024-04-09 PROCEDURE — 80053 COMPREHEN METABOLIC PANEL: CPT

## 2024-04-09 PROCEDURE — 84443 ASSAY THYROID STIM HORMONE: CPT

## 2024-04-19 ENCOUNTER — PHYSICAL THERAPY (OUTPATIENT)
Dept: PHYSICAL THERAPY | Facility: MEDICAL CENTER | Age: 74
End: 2024-04-19
Attending: ORTHOPAEDIC SURGERY
Payer: MEDICARE

## 2024-04-19 DIAGNOSIS — M17.12 PRIMARY OSTEOARTHRITIS OF LEFT KNEE: ICD-10-CM

## 2024-04-19 PROCEDURE — 97110 THERAPEUTIC EXERCISES: CPT

## 2024-04-19 PROCEDURE — 97140 MANUAL THERAPY 1/> REGIONS: CPT

## 2024-04-19 NOTE — OP THERAPY DAILY TREATMENT
Outpatient Physical Therapy  DAILY TREATMENT     [facility] Outpatient Physical Therapy  [address]  [place] [address]  Phone:  [phone]  Fax:  [phone]    Date: 04/19/2024    Patient: Mini Pham  YOB: 1950  MRN: [id number]     Time Calculation    Start time: 1030  Stop time: 1110 Time Calculation (min): 40 minutes         Chief Complaint: Knee Problem    Visit #: 12    SUBJECTIVE:  Pt reports she met with PA recently, reports that meeting went well.     OBJECTIVE:  Current objective measures:   AROM 0-104  PROM: 0-109          Therapeutic Exercises (CPT 56022):     1. Nustep, L1 x 6 min, seat 8    2. Bike, Rocking (5 mins)    3. AAROM knee flexion with belt, 2x20, HEP    5. Sit to stand (R foot forward), 2x8, HEP    6. SLR, 2x12, NT    8. Step up, x12, HEP      Therapeutic Exercise Summary: Pt was educated today regarding increasing ROM via bike rocking.    Therapeutic Treatments and Modalities:     1. Manual Therapy (CPT 28562), L knee, Supine PROM L knee flexion, overpressure PROM L knee extension    Time-based treatments/modalities:    Physical Therapy Timed Treatment Charges  Manual therapy minutes (CPT 66534): 10 minutes  Therapeutic exercise minutes (CPT 32675): 30 minutes      Pain rating (1-10) before treatment:  1    ASSESSMENT:   Response to treatment: Pt had good tolerance for today's PT session. Pt ROM will require further emphasis for tx POC. Pt will continue to benefit from skilled PT to address aforementioned deficits.      PLAN/RECOMMENDATIONS:   Plan for treatment: therapy treatment to continue next visit.  Planned interventions for next visit: continue with current treatment, neuromuscular re-education (CPT 88776), therapeutic activities (CPT 43505), and therapeutic exercise (CPT 83201).

## 2024-04-23 ENCOUNTER — PHYSICAL THERAPY (OUTPATIENT)
Dept: PHYSICAL THERAPY | Facility: MEDICAL CENTER | Age: 74
End: 2024-04-23
Attending: ORTHOPAEDIC SURGERY
Payer: MEDICARE

## 2024-04-23 ENCOUNTER — APPOINTMENT (OUTPATIENT)
Dept: PHYSICAL THERAPY | Facility: MEDICAL CENTER | Age: 74
End: 2024-04-23
Payer: MEDICARE

## 2024-04-23 DIAGNOSIS — M17.12 PRIMARY OSTEOARTHRITIS OF LEFT KNEE: ICD-10-CM

## 2024-04-23 PROCEDURE — 97140 MANUAL THERAPY 1/> REGIONS: CPT

## 2024-04-23 PROCEDURE — 97110 THERAPEUTIC EXERCISES: CPT

## 2024-04-23 NOTE — OP THERAPY DAILY TREATMENT
Outpatient Physical Therapy  DAILY TREATMENT     [facility] Outpatient Physical Therapy  [address]  [place] [address]  Phone:  [phone]  Fax:  [phone]    Date: 04/23/2024    Patient: Mini Pham  YOB: 1950  MRN: [id number]     Time Calculation    Start time: 1029  Stop time: 1114 Time Calculation (min): 45 minutes         Chief Complaint: Knee Problem    Visit #: 13    SUBJECTIVE:  Pt reports her knee is feeling pretty good today, she does report continued feeling of tightness when attempting to bend her knee. She reports she feels like things are moving slowly.     OBJECTIVE:  Current objective measures:   AROM L knee: 0-107  PROM L knee: 0-110          Therapeutic Exercises (CPT 51963):     1. Nustep, L1 x 6 min, seat 8    2. Bike, Rocking (5 mins)    3. AAROM knee flexion with belt, 2x20, HEP    5. Sit to stand (R foot forward), 2x8, HEP    6. SLR, 2x12, NT    8. Step up, x12, HEP      Therapeutic Exercise Summary: Pt was educated today regarding increasing ROM via bike rocking.    Therapeutic Treatments and Modalities:     1. Manual Therapy (CPT 85625), L knee, Supine PROM L knee flexion, overpressure PROM L knee extension, Prone PROM L knee flexion    Time-based treatments/modalities:    Physical Therapy Timed Treatment Charges  Manual therapy minutes (CPT 34578): 10 minutes  Therapeutic exercise minutes (CPT 30200): 35 minutes      Pain rating (1-10) before treatment:  0    ASSESSMENT:   Response to treatment: Pt continues to make slow progress, continued manual therapy and exercise in conjunction with rocking on bike will benefit pt both from ROM and functional standpoint.    PLAN/RECOMMENDATIONS:   Plan for treatment: therapy treatment to continue next visit.  Planned interventions for next visit: continue with current treatment, manual therapy (CPT 48378), therapeutic activities (CPT 34127), and therapeutic exercise (CPT 53247).

## 2024-04-25 ENCOUNTER — PHYSICAL THERAPY (OUTPATIENT)
Dept: PHYSICAL THERAPY | Facility: MEDICAL CENTER | Age: 74
End: 2024-04-25
Attending: ORTHOPAEDIC SURGERY
Payer: MEDICARE

## 2024-04-25 DIAGNOSIS — M17.12 PRIMARY OSTEOARTHRITIS OF LEFT KNEE: ICD-10-CM

## 2024-04-25 PROCEDURE — 97110 THERAPEUTIC EXERCISES: CPT

## 2024-04-25 PROCEDURE — 97140 MANUAL THERAPY 1/> REGIONS: CPT

## 2024-04-25 NOTE — OP THERAPY PROGRESS SUMMARY
Outpatient Physical Therapy  PROGRESS SUMMARY NOTE      [facility] Outpatient Physical Therapy  [address]  [place] [address]  Phone:  [phone]  Fax:  [phone]    Date of Visit: 04/25/2024    Patient: Mini Pham  YOB: 1950  MRN: [id number]     Referring Provider: Chantelle Benton M.D.  [address] [place] [address]   Referring Diagnosis Unilateral primary osteoarthritis, left knee [M17.12]     Visit Diagnoses     ICD-10-CM   1. Primary osteoarthritis of left knee  M17.12       Rehab Potential: good    Progress Report Period: 4/1/24-4/25/24    Functional Assessment Used  WOMAC Grand Total: 10.42       Objective Findings and Assessment:   Patient progression towards goals: Short Term Goals:   1. Pt will achieve 20 or lower on WOMAC (achieved)  2. Pt will achieve 120 degrees of PROM of L knee flexion (not achieved)  3. Pt will achieve Full extension AROM of L knee (achieved)    Long Term Goals:    1. Pt will achieve 30 second sit to stand test within age appropriate norms (not achieved)  2. Pt will achieve AROM of L knee 0-120 degrees (not achieved)      Objective findings and assessment details: Pt has made excellent functional progress, her greatest deficit at this time is ROM of the L knee. Pt will continue to benefit from skilled PT to address deficits of ROM and balance to ensure appropriate discharge.     Goals:   Short Term Goals:   1. Pt willl achieve 120 degrees of PROM of L knee flexion  2. Pt will achieve 30 second sit to stand test within age appropriate norms    Short term goal time span:  2-4 weeks      Long Term Goals:    1. Pt will achieve AROM of L knee 0-120 degrees   Long term goal time span:  6-8 weeks    Plan:   Planned therapy interventions:  Neuromuscular Re-education (CPT 55289), Therapeutic Exercise (CPT 99646) and Therapeutic Activities (CPT 38331)  Frequency:  2x week  Duration in weeks:  8      Referring provider  co-signature:  I have reviewed this plan of care and my co-signature certifies the need for services.     Certification Period: 04/25/2024 to 06/24/24    Physician Signature: ________________________________ Date: ______________

## 2024-04-25 NOTE — OP THERAPY DAILY TREATMENT
Outpatient Physical Therapy  DAILY TREATMENT     [facility] Outpatient Physical Therapy  [address]  [place] [address]  Phone:  [phone]  Fax:  [phone]    Date: 04/25/2024    Patient: Mini Pham  YOB: 1950  MRN: [id number]     Time Calculation    Start time: 0730  Stop time: 0815 Time Calculation (min): 45 minutes         Chief Complaint: Knee Problem    Visit #: 14    SUBJECTIVE:  Pt reports her knee is ok today.    OBJECTIVE:  Current objective measures:   PROM L knee: 0-109   AROM L knee: 0-107  WOMAC Grand Total: 10.42       Therapeutic Exercises (CPT 19136):     1. Nustep, L1 x 6 min, seat 8    3. AAROM knee flexion with belt, 2x20, HEP    5. Sit to stand (R foot forward), 2x8, HEP    6. SLR, 2x12    7. Stepping drill (forward, lateral), x12 ea. leg      Therapeutic Exercise Summary: Pt was educated today regarding increasing ROM via bike rocking.    Therapeutic Treatments and Modalities:     1. Manual Therapy (CPT 40632), L knee, Supine PROM L knee flexion, overpressure PROM L knee extension, Prone PROM L knee flexion    Time-based treatments/modalities:    Physical Therapy Timed Treatment Charges  Manual therapy minutes (CPT 49151): 10 minutes  Therapeutic exercise minutes (CPT 95274): 35 minutes      Pain rating (1-10) before treatment:  0    ASSESSMENT:   Response to treatment: Pt continues to progress slowly. Her  ROM has improved, however concerns regarding potential to achieve 120 degrees of active ROM remain. Pt will continue to benefit from skilled PT to address aforementioned deficits.     PLAN/RECOMMENDATIONS:   Plan for treatment: therapy treatment to continue next visit.  Planned interventions for next visit: continue with current treatment, manual therapy (CPT 60879), neuromuscular re-education (CPT 11291), therapeutic activities (CPT 96530), and therapeutic exercise (CPT 28410).

## 2024-04-30 ENCOUNTER — PHYSICAL THERAPY (OUTPATIENT)
Dept: PHYSICAL THERAPY | Facility: MEDICAL CENTER | Age: 74
End: 2024-04-30
Attending: ORTHOPAEDIC SURGERY
Payer: MEDICARE

## 2024-04-30 ENCOUNTER — APPOINTMENT (OUTPATIENT)
Dept: PHYSICAL THERAPY | Facility: MEDICAL CENTER | Age: 74
End: 2024-04-30
Payer: MEDICARE

## 2024-04-30 DIAGNOSIS — M17.12 PRIMARY OSTEOARTHRITIS OF LEFT KNEE: ICD-10-CM

## 2024-04-30 PROCEDURE — 97110 THERAPEUTIC EXERCISES: CPT

## 2024-04-30 NOTE — OP THERAPY DISCHARGE SUMMARY
Outpatient Physical Therapy  DISCHARGE SUMMARY NOTE      [facility] Outpatient Physical Therapy  [address]  [place] [address]  Phone:  [phone]  Fax:  [phone]    Date of Visit: 04/30/2024    Patient: Mini Pham  YOB: 1950  MRN: [id number]     Referring Provider: Chantelle Benton M.D.  [address] [place] [address]   Referring Diagnosis Unilateral primary osteoarthritis, left knee [M17.12]         Functional Assessment Used        Your patient is being discharged from Physical Therapy with the following comments:   Progress plateau    Comments:  Pt will follow up in 3 months.     Limitations Remaining:  ROM (flexion)    Recommendations:  Follow up in 3 months    Kaushik Reynoso, PT    Date: 4/30/2024

## 2024-04-30 NOTE — OP THERAPY DAILY TREATMENT
Outpatient Physical Therapy  DAILY TREATMENT     [facility] Outpatient Physical Therapy  [address]  [place] [address]  Phone:  [phone]  Fax:  [phone]    Date: 04/30/2024    Patient: Mini Pham  YOB: 1950  MRN: [id number]     Time Calculation    Start time: 1330  Stop time: 1355 Time Calculation (min): 25 minutes         Chief Complaint: Knee Problem    Visit #: 15    SUBJECTIVE:  Pt reports she continues to do well with her knee exercises and reports that she is able to walk far more comfortably and perform all ADL's pain free or near pain free (2/10 or lower on numeric pain rating scale)      OBJECTIVE:  Current objective measures:   AROM: 0-105  PROM: 0-108          Therapeutic Exercises (CPT 46286):     1. Nustep, L1 x 6 min, seat 8    3. AAROM knee flexion with belt, 2x20, HEP    5. Sit to stand (R foot forward), 2x8, HEP    6. SLR, 2x12      Therapeutic Exercise Summary: Pt was educated today regarding discharge today with anticipated follow up.     Therapeutic Treatments and Modalities:     1. Manual Therapy (CPT 34634), L knee, Supine PROM L knee flexion, overpressure PROM L knee extension, Prone PROM L knee flexion      Time-based treatments/modalities:    Physical Therapy Timed Treatment Charges  Therapeutic exercise minutes (CPT 84516): 25 minutes      Pain rating (1-10) before treatment:  1    ASSESSMENT:   Response to treatment: Recommendations were made to discharge pt given her functional deficits have dramatically reduced. Pt was educated to continue to focus independently on ROM at home for next 2-3 months and follow up at that time to assess for ROM.     PLAN/RECOMMENDATIONS:   Plan for treatment: no further treatment needed.  Planned interventions for next visit:  Discharge, follow up in 3 months .

## 2024-05-02 ENCOUNTER — APPOINTMENT (OUTPATIENT)
Dept: PHYSICAL THERAPY | Facility: MEDICAL CENTER | Age: 74
End: 2024-05-02
Attending: ORTHOPAEDIC SURGERY
Payer: MEDICARE

## 2024-05-16 ENCOUNTER — APPOINTMENT (OUTPATIENT)
Dept: PHYSICAL THERAPY | Facility: MEDICAL CENTER | Age: 74
End: 2024-05-16
Attending: ORTHOPAEDIC SURGERY
Payer: MEDICARE

## 2024-05-30 ENCOUNTER — APPOINTMENT (OUTPATIENT)
Dept: PHYSICAL THERAPY | Facility: MEDICAL CENTER | Age: 74
End: 2024-05-30
Attending: ORTHOPAEDIC SURGERY
Payer: MEDICARE

## 2024-06-05 ENCOUNTER — HOSPITAL ENCOUNTER (OUTPATIENT)
Dept: LAB | Facility: MEDICAL CENTER | Age: 74
End: 2024-06-05
Attending: FAMILY MEDICINE
Payer: MEDICARE

## 2024-06-05 DIAGNOSIS — I10 ESSENTIAL HYPERTENSION: ICD-10-CM

## 2024-06-05 DIAGNOSIS — E78.5 DYSLIPIDEMIA: ICD-10-CM

## 2024-06-05 LAB
ALBUMIN SERPL BCP-MCNC: 4 G/DL (ref 3.2–4.9)
ALBUMIN/GLOB SERPL: 1.5 G/DL
ALP SERPL-CCNC: 128 U/L (ref 30–99)
ALT SERPL-CCNC: 7 U/L (ref 2–50)
ANION GAP SERPL CALC-SCNC: 12 MMOL/L (ref 7–16)
AST SERPL-CCNC: 11 U/L (ref 12–45)
BILIRUB SERPL-MCNC: 0.3 MG/DL (ref 0.1–1.5)
BUN SERPL-MCNC: 9 MG/DL (ref 8–22)
CALCIUM ALBUM COR SERPL-MCNC: 8.8 MG/DL (ref 8.5–10.5)
CALCIUM SERPL-MCNC: 8.8 MG/DL (ref 8.5–10.5)
CHLORIDE SERPL-SCNC: 106 MMOL/L (ref 96–112)
CO2 SERPL-SCNC: 23 MMOL/L (ref 20–33)
CREAT SERPL-MCNC: 0.52 MG/DL (ref 0.5–1.4)
FASTING STATUS PATIENT QL REPORTED: NORMAL
GFR SERPLBLD CREATININE-BSD FMLA CKD-EPI: 98 ML/MIN/1.73 M 2
GLOBULIN SER CALC-MCNC: 2.6 G/DL (ref 1.9–3.5)
GLUCOSE SERPL-MCNC: 86 MG/DL (ref 65–99)
POTASSIUM SERPL-SCNC: 4.2 MMOL/L (ref 3.6–5.5)
PROT SERPL-MCNC: 6.6 G/DL (ref 6–8.2)
SODIUM SERPL-SCNC: 141 MMOL/L (ref 135–145)

## 2024-06-05 PROCEDURE — 80053 COMPREHEN METABOLIC PANEL: CPT

## 2024-06-05 PROCEDURE — 36415 COLL VENOUS BLD VENIPUNCTURE: CPT

## 2024-06-12 DIAGNOSIS — Z96.652 PRESENCE OF TOTAL KNEE JOINT PROSTHESIS, LEFT: ICD-10-CM

## 2024-06-12 DIAGNOSIS — M17.0 PRIMARY OSTEOARTHRITIS OF BOTH KNEES: ICD-10-CM

## 2024-06-13 ENCOUNTER — ANTICOAGULATION VISIT (OUTPATIENT)
Dept: MEDICAL GROUP | Facility: MEDICAL CENTER | Age: 74
End: 2024-06-13
Payer: MEDICARE

## 2024-06-13 DIAGNOSIS — I48.0 PAROXYSMAL A-FIB (HCC): ICD-10-CM

## 2024-06-13 PROCEDURE — 99211 OFF/OP EST MAY X REQ PHY/QHP: CPT | Performed by: STUDENT IN AN ORGANIZED HEALTH CARE EDUCATION/TRAINING PROGRAM

## 2024-06-13 NOTE — PROGRESS NOTES
Target end date: Indefinite  Indication: Afib  Drug: Eliquis 5 mg BID  CHADsVASC = at least 3 (age, gender, HTN)  HAS-BLED = 1 (age)    Health Status Since Last Assessment  Pt denies any new relevant medical problems, ED visits or hospitalizations  Pt denies any embolic events (stroke/tia/systemic embolism)      Adherence with DOAC Therapy  Pt has not missed doses in the average week.    Bleeding Risk Assessment  Pt denies epistaxis  Pt denies any hematuria  Pt denies any excessive or unusual bleeding/hematomas.  Pt denies any GI bleeds or hematemesis.  Pt denies any concerning daily headache or subdural hematoma symptoms.    Latest Hemoglobin: WNL  Hemoglobin   Date Value Ref Range Status   04/09/2024 14.3 12.0 - 16.0 g/dL Final     Latest Platelets: WNL  Platelet Count   Date Value Ref Range Status   04/09/2024 416 164 - 446 K/uL Final       Pt denies  ETOH overuse     Creatinine Clearance/Renal Function  Latest CrCl: >100 ml/min    Hepatic function  Latest LFTs: WNL - Alk phos elevated but stable from previous  Pt denies any history of liver dysfunction        Drug Interactions  ASA/other antiplatelets: None  NSAID: None  Other drug interactions: None  Verified no anticonvulsant or azole therapy, education provided for future use.     Examination  Blood Pressure  patient declined  There were no vitals filed for this visit.  Symptomatic hypotension: Denies   Significant gait impairment/imbalance/high risk for falls? None    Final Assessment and Recommendations:  Patient appears stable from the anticoagulation standpoint.    Benefits of continued DOAC therapy outweigh risks for this patient  Recommend pt continue with current DOAC therapy: Eliquis 5 mg BID  DOAC is affordable     Other Actions: CMP/CBC hemogram ordered prior to next visit    Follow up:  Will follow up with patient 6 month(s).     Nuvia Meredith, CarlosD

## 2024-07-09 DIAGNOSIS — I48.0 PAROXYSMAL A-FIB (HCC): ICD-10-CM

## 2024-07-09 DIAGNOSIS — I48.91 ATRIAL FIBRILLATION WITH RVR (HCC): ICD-10-CM

## 2024-07-10 ENCOUNTER — PATIENT MESSAGE (OUTPATIENT)
Dept: MEDICAL GROUP | Facility: MEDICAL CENTER | Age: 74
End: 2024-07-10
Payer: MEDICARE

## 2024-07-10 DIAGNOSIS — I48.91 ATRIAL FIBRILLATION WITH RVR (HCC): ICD-10-CM

## 2024-07-10 DIAGNOSIS — I48.0 PAROXYSMAL A-FIB (HCC): ICD-10-CM

## 2024-07-13 ASSESSMENT — ENCOUNTER SYMPTOMS
SYNCOPE: 0
NIGHT SWEATS: 0
COUGH: 0
WEAKNESS: 0
FEVER: 0
DYSPNEA ON EXERTION: 0
DIZZINESS: 0
DIARRHEA: 0
ABDOMINAL PAIN: 0
SHORTNESS OF BREATH: 0
ORTHOPNEA: 0
VOMITING: 0
NAUSEA: 0
PALPITATIONS: 0
PND: 0
FOCAL WEAKNESS: 0
NEAR-SYNCOPE: 0
IRREGULAR HEARTBEAT: 0
WHEEZING: 0

## 2024-07-16 ENCOUNTER — OFFICE VISIT (OUTPATIENT)
Dept: CARDIOLOGY | Facility: MEDICAL CENTER | Age: 74
End: 2024-07-16
Attending: STUDENT IN AN ORGANIZED HEALTH CARE EDUCATION/TRAINING PROGRAM
Payer: MEDICARE

## 2024-07-16 VITALS
SYSTOLIC BLOOD PRESSURE: 114 MMHG | DIASTOLIC BLOOD PRESSURE: 76 MMHG | WEIGHT: 213 LBS | HEIGHT: 65 IN | RESPIRATION RATE: 14 BRPM | BODY MASS INDEX: 35.49 KG/M2 | HEART RATE: 66 BPM | OXYGEN SATURATION: 96 %

## 2024-07-16 DIAGNOSIS — I48.0 PAROXYSMAL A-FIB (HCC): ICD-10-CM

## 2024-07-16 DIAGNOSIS — E78.5 DYSLIPIDEMIA: ICD-10-CM

## 2024-07-16 DIAGNOSIS — I48.0 HYPERCOAGULABLE STATE DUE TO PAROXYSMAL ATRIAL FIBRILLATION (HCC): ICD-10-CM

## 2024-07-16 DIAGNOSIS — I10 ESSENTIAL HYPERTENSION: ICD-10-CM

## 2024-07-16 DIAGNOSIS — D68.69 HYPERCOAGULABLE STATE DUE TO PAROXYSMAL ATRIAL FIBRILLATION (HCC): ICD-10-CM

## 2024-07-16 PROCEDURE — 99214 OFFICE O/P EST MOD 30 MIN: CPT | Performed by: STUDENT IN AN ORGANIZED HEALTH CARE EDUCATION/TRAINING PROGRAM

## 2024-07-16 PROCEDURE — 3078F DIAST BP <80 MM HG: CPT | Performed by: STUDENT IN AN ORGANIZED HEALTH CARE EDUCATION/TRAINING PROGRAM

## 2024-07-16 PROCEDURE — 3074F SYST BP LT 130 MM HG: CPT | Performed by: STUDENT IN AN ORGANIZED HEALTH CARE EDUCATION/TRAINING PROGRAM

## 2024-07-16 PROCEDURE — 99212 OFFICE O/P EST SF 10 MIN: CPT | Performed by: STUDENT IN AN ORGANIZED HEALTH CARE EDUCATION/TRAINING PROGRAM

## 2024-07-16 ASSESSMENT — FIBROSIS 4 INDEX: FIB4 SCORE: 0.72

## 2024-07-19 ENCOUNTER — TELEPHONE (OUTPATIENT)
Dept: CARDIOLOGY | Facility: MEDICAL CENTER | Age: 74
End: 2024-07-19
Payer: MEDICARE

## 2024-07-27 ENCOUNTER — HOSPITAL ENCOUNTER (EMERGENCY)
Facility: MEDICAL CENTER | Age: 74
End: 2024-07-27
Attending: EMERGENCY MEDICINE
Payer: MEDICARE

## 2024-07-27 VITALS
TEMPERATURE: 97.3 F | HEIGHT: 65 IN | BODY MASS INDEX: 35.59 KG/M2 | OXYGEN SATURATION: 96 % | RESPIRATION RATE: 16 BRPM | WEIGHT: 213.63 LBS | HEART RATE: 68 BPM | DIASTOLIC BLOOD PRESSURE: 82 MMHG | SYSTOLIC BLOOD PRESSURE: 161 MMHG
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
## 2024-07-27 PROCEDURE — 304217 HCHG IRRIGATION SYSTEM

## 2024-07-27 PROCEDURE — 99284 EMERGENCY DEPT VISIT MOD MDM: CPT

## 2024-07-27 RX ORDER — CEPHALEXIN 500 MG/1
500 CAPSULE ORAL 4 TIMES DAILY
Qty: 20 CAPSULE | Refills: 0 | Status: ACTIVE | OUTPATIENT
Start: 2024-07-27 | End: 2024-08-01

## 2024-07-27 ASSESSMENT — FIBROSIS 4 INDEX: FIB4 SCORE: 0.72

## 2024-07-27 NOTE — ED TRIAGE NOTES
Pt presents by self from home for cat scatch on 7/21. Wound keeps dehiscing and bleeding. She is on Eliquis (afib). Tried styptik powder without relief. Clotted dark red blood noted around wound. Pt denies redness, swelling, drainage, or fever. Pt A&Ox4 and ambulatory.

## 2024-07-27 NOTE — ED PROVIDER NOTES
ED Provider Note    CHIEF COMPLAINT  Chief Complaint   Patient presents with    Wound Check       HPI/ROS    Mini Pham is a 74 y.o. female who presents with bleeding from the dorsal part of her left hand.  The patient was inadvertently scratched by her cat about a week ago.  She is on anticoagulation and she is been unable to control the bleeding.  She does not have any significant discomfort.  She has not had any associated fevers.    PAST MEDICAL HISTORY   has a past medical history of Acute nasopharyngitis (2023), Anemia, pernicious, Anxiety, Arrhythmia, Arthritis (), Atrophic gastritis, Bronchitis, High cholesterol (), History of iron deficiency anemia (10/18/2023), Hyperlipidemia, Hypertension, Infectious disease, Intestinal metaplasia of stomach (10/03/2022), Osteoarthritis, Pain (), Paroxysmal A-fib (HCC) (03/15/2024), Prediabetes, Pregnant (), Presence of total knee joint prosthesis, left (03/15/2024), and Tendonitis.    SURGICAL HISTORY   has a past surgical history that includes cholecystectomy () and arthroplasty, knee, robot-assisted (Left, 2024).    FAMILY HISTORY  Family History   Problem Relation Age of Onset    Alzheimer's Disease Mother 50        early onset    Cancer Father 50        laryngeal, mets to bladder    Heart Attack Sister 56    Diabetes Sister     Hyperlipidemia Sister     Depression Sister     Colon Cancer Sister 69    Depression Sister     Cancer Maternal Aunt 50        colon    No Known Problems Maternal Grandmother     Diabetes Maternal Grandfather         late onset    No Known Problems Paternal Grandmother     No Known Problems Paternal Grandfather     Hypertension Neg Hx        SOCIAL HISTORY  Social History     Tobacco Use    Smoking status: Former     Current packs/day: 0.00     Average packs/day: 1 pack/day for 30.0 years (30.0 ttl pk-yrs)     Types: Cigarettes     Start date:      Quit date:      Years since quittin.5     "Smokeless tobacco: Never   Vaping Use    Vaping status: Never Used   Substance and Sexual Activity    Alcohol use: Yes     Alcohol/week: 0.6 oz     Types: 1 Glasses of wine per week     Comment: 3 times/week    Drug use: Not Currently     Types: Cocaine, Marijuana     Comment: Remote, 50 years ago    Sexual activity: Not Currently       CURRENT MEDICATIONS  Home Medications       Reviewed by Kori Chopra R.N. (Registered Nurse) on 07/27/24 at 1404  Med List Status: Not Addressed     Medication Last Dose Status   amLODIPine (NORVASC) 10 MG Tab  Active   apixaban (ELIQUIS) 5mg Tab  Active   atorvastatin (LIPITOR) 10 MG Tab  Active   carvedilol (COREG) 3.125 MG Tab  Active   citalopram (CELEXA) 20 MG Tab  Active   Cyanocobalamin (VITAMIN B 12 PO)  Active   losartan (COZAAR) 100 MG Tab  Active   VITAMIN D PO  Active                    ALLERGIES  No Known Allergies    PHYSICAL EXAM  VITAL SIGNS: BP (!) 156/89   Pulse 72   Temp 36.3 °C (97.3 °F) (Temporal)   Resp 18   Ht 1.651 m (5' 5\")   Wt 96.9 kg (213 lb 10 oz)   BMI 35.55 kg/m²    In general the patient does not appear toxic    Extremities patient has a 0.5 cm laceration to the dorsum of the left hand with no surrounding erythema nor induration.  She has mild bleeding.  She does not have any skeletal deformities.  She has full flexion extension at the MCP and IP joints of the left hand.    Skin the laceration noted to the left hand      COURSE & MEDICAL DECISION MAKING    This is 74-year-old female who presents the emergency department with persistent bleeding from a laceration to the dorsum of the left hand.  She is currently on Eliquis.  I did irrigate the wound and it does not currently appear infected.  I placed antibiotic ointment as well as a sterile 4 x 4 and a compression dressing with Coban.  The patient will leave the dressing in place for the next 24 hours.  She will change the dressing and reapply antibiotic ointment.  I will place the patient " on Keflex.  We discussed primary closure and due to the significant risk of infection we agreed to hold off and we will allow this to heal via secondary intention.    FINAL DIAGNOSIS  1.  Cat scratch left hand with 0.5 cm laceration  2.  Iatrogenic coagulopathy    Disposition  The patient will be discharged in stable condition       Electronically signed by: Estrada Velasco M.D., 7/27/2024 2:48 PM

## 2024-07-27 NOTE — DISCHARGE INSTRUCTIONS
Change the dressing every 24 hours as discussed.  Return for persistent bleeding or signs of infection.

## 2024-07-27 NOTE — ED NOTES
Pt provided with DC paper work and education on follow up care. Pt declines questions and ambulated out of Er with extra dressing supplies per Verbal order from ERP.

## 2024-08-02 DIAGNOSIS — E78.5 DYSLIPIDEMIA: ICD-10-CM

## 2024-08-02 DIAGNOSIS — K29.40 ATROPHIC GASTRITIS WITHOUT HEMORRHAGE: ICD-10-CM

## 2024-08-02 DIAGNOSIS — I48.0 PAROXYSMAL A-FIB (HCC): ICD-10-CM

## 2024-08-02 DIAGNOSIS — I10 ESSENTIAL HYPERTENSION: ICD-10-CM

## 2024-08-08 ENCOUNTER — PHYSICAL THERAPY (OUTPATIENT)
Dept: PHYSICAL THERAPY | Facility: MEDICAL CENTER | Age: 74
End: 2024-08-08
Attending: FAMILY MEDICINE
Payer: MEDICARE

## 2024-08-08 DIAGNOSIS — M17.0 PRIMARY OSTEOARTHRITIS OF BOTH KNEES: ICD-10-CM

## 2024-08-08 DIAGNOSIS — Z96.652 PRESENCE OF TOTAL KNEE JOINT PROSTHESIS, LEFT: ICD-10-CM

## 2024-08-08 PROCEDURE — 97110 THERAPEUTIC EXERCISES: CPT

## 2024-08-08 PROCEDURE — 97161 PT EVAL LOW COMPLEX 20 MIN: CPT

## 2024-08-08 ASSESSMENT — ENCOUNTER SYMPTOMS
PAIN SCALE AT HIGHEST: 0
PAIN TIMING: IN THE MORNING
ALLEVIATING FACTORS: ACTIVITY
QUALITY: ACHING
PAIN SCALE AT LOWEST: 0
EXACERBATED BY: WALKING
PAIN SCALE: 0

## 2024-08-08 NOTE — OP THERAPY EVALUATION
Outpatient Physical Therapy  INITIAL EVALUATION    [facility] Outpatient Physical Therapy  [address]  [place] [address]  Phone:  [phone]  Fax:  [phone]    Date of Evaluation: 2024    Patient: Mini Pham  YOB: 1950  MRN: [id number]     Referring Provider: WENDY Olivaresgle [facility]  [address]  [place] [address]   Referring Diagnosis Primary osteoarthritis of both knees [M17.0];Presence of total knee joint prosthesis, left [Z96.652]     Time Calculation  Start time: 1030  Stop time: 1111 Time Calculation (min): 41 minutes         Chief Complaint: Knee Problem and Hip Problem    Visit Diagnoses     ICD-10-CM   1. Primary osteoarthritis of both knees  M17.0   2. Presence of total knee joint prosthesis, left  Z96.652       Date of onset of impairment: 2024    Subjective:   History of Present Illness:     Date of onset:  2024    Mechanism of injury:  Pt returns to PT following a previous PT episode for s/p L TKA. Pt reports he knee has been doing well and that her pain is down quite a bit. She does however have complaints of R sided hip pain.   Pain:     Current pain ratin    At best pain ratin    At worst pain ratin    Quality:  Aching    Pain timing:  In the morning    Relieving factors:  Activity    Aggravating factors:  Walking    Progression:  Improving  Patient Goals:     Patient goals for therapy:  Decreased pain and increased strength      Past Medical History:   Diagnosis Date    Acute nasopharyngitis 2023    Anemia, pernicious     Anxiety     Arrhythmia     PVC in past    Arthritis 2005    Atrophic gastritis     Bronchitis     High cholesterol 2000    History of iron deficiency anemia 10/18/2023    Hyperlipidemia     Hypertension     Infectious disease     Intestinal metaplasia of stomach 10/03/2022    Osteoarthritis     Pain 2005    Arthritis pain    Paroxysmal A-fib (HCC) 03/15/2024     Prediabetes     fasting glucose 102    Pregnant 1973    Termination    Presence of total knee joint prosthesis, left 03/15/2024    Tendonitis     chronic, elbows and wrists     Past Surgical History:   Procedure Laterality Date    ARTHROPLASTY, KNEE, ROBOT-ASSISTED Left 2024    Procedure: LEFT TOTAL KNEE ARTHROPLASTY;  Surgeon: Chantelle Benton M.D.;  Location: SURGERY [facility];  Service: Ortho Robotic    CHOLECYSTECTOMY       Social History     Tobacco Use    Smoking status: Former     Current packs/day: 0.00     Average packs/day: 1 pack/day for 30.0 years (30.0 ttl pk-yrs)     Types: Cigarettes     Start date:      Quit date:      Years since quittin.6    Smokeless tobacco: Never   Substance Use Topics    Alcohol use: Yes     Alcohol/week: 0.6 oz     Types: 1 Glasses of wine per week     Comment: 3 times/week     Family and Occupational History     Socioeconomic History    Marital status: Single     Spouse name: Not on file    Number of children: Not on file    Years of education: Not on file    Highest education level: Associate degree: occupational, technical, or vocational program   Occupational History    Occupation: retired     Comment:  in [place] and [place]       Objective     Active Range of Motion   Left Knee   Flexion: 110 degrees     Right Knee   Flexion: 116 degrees     Passive Range of Motion   Left Hip   Normal passive range of motion    Right Hip   Normal passive range of motion    Additional Passive Range of Motion Details  Pt had good relief of symptoms with long axis traction of the R hip.     Strength:      Left Knee   Normal strength  Quadriceps contraction: good    Right Knee   Normal strength  Quadriceps contraction: good        Therapeutic Exercises (CPT 87210):     1. Clamshell, PTB 2x8, HEP    2. BKFO, x15, HEP    3. Side stepping with band, PTB 5x, HEP      Therapeutic Exercise Summary: Pt was educated today regarding current condition,  expectations for rehab potential and appropriate exercise program for home. HEP was given to patient in print out form and instructions were communicated to pt.         Time-based treatments/modalities:    Physical Therapy Timed Treatment Charges  Therapeutic exercise minutes (CPT 43931): 15 minutes      Assessment, Response and Plan:   Impairments: pain with function and weight-bearing intolerance    Assessment details:  Pt is a 74 y/o F presenting to PT with signs and symptoms consistent with R hip pain secondary to osteoarthritic changes of the R hip. Pt has deficits of pain with ambulation and weight bearing. Given pt's age, overall health, current condition and adherence to PT recommendations, anticipated duration of episode is 8 weeks.      Prognosis: good    Goals:   Short Term Goals:   1. Pt will report 50% reduction of R hip symptoms  Short term goal time span:  2-4 weeks      Long Term Goals:    1. Pt will report 75% reduction of R hip symptoms  Long term goal time span:  6-8 weeks    Plan:   Therapy options:  Physical therapy treatment to continue  Planned therapy interventions:  Neuromuscular Re-education (CPT 57911), Manual Therapy (CPT 24719), Therapeutic Activities (CPT 81235) and Therapeutic Exercise (CPT 97814)  Frequency:  2x week  Duration in weeks:  8  Discussed with:  Patient      Functional Assessment Used  WOMAC Grand Total: 4.17     Referring provider co-signature:  I have reviewed this plan of care and my co-signature certifies the need for services.    Certification Period: 08/08/2024 to  10/07/24    Physician Signature: ________________________________ Date: ______________

## 2024-08-15 ENCOUNTER — PHYSICAL THERAPY (OUTPATIENT)
Dept: PHYSICAL THERAPY | Facility: MEDICAL CENTER | Age: 74
End: 2024-08-15
Attending: FAMILY MEDICINE
Payer: MEDICARE

## 2024-08-15 DIAGNOSIS — M25.551 RIGHT HIP PAIN: ICD-10-CM

## 2024-08-15 DIAGNOSIS — M17.0 PRIMARY OSTEOARTHRITIS OF BOTH KNEES: ICD-10-CM

## 2024-08-15 PROCEDURE — 97110 THERAPEUTIC EXERCISES: CPT

## 2024-08-15 NOTE — OP THERAPY DAILY TREATMENT
Outpatient Physical Therapy  DAILY TREATMENT     [facility] Outpatient Physical Therapy  [address]  [place] [address]  Phone:  [phone]  Fax:  [phone]    Date: 08/15/2024    Patient: Mini Pham  YOB: 1950  MRN: [id number]     Time Calculation    Start time: 1039  Stop time: 1100 Time Calculation (min): 21 minutes         Chief Complaint: Hip Problem and Knee Problem    Visit #: 2    SUBJECTIVE:  Pt reports her hip is doing ok. She reports that she can walk, when she does she will get pain initially but will subside the more she walks.             Therapeutic Exercises (CPT 88595):     1. Clamshell, PTB 2x8, HEP    2. BKFO, x15, HEP    3. Side stepping with band, PTB 5x, HEP    4. Sidelying hip abd, 2x10, HEP    5. Reverse clam, GTB x12    6. Standing hip abd, GTB 2x10      Therapeutic Exercise Summary: Pt was educated today regarding expectations for continued exercise, activity and HEP independently following discharge.       Time-based treatments/modalities:    Physical Therapy Timed Treatment Charges  Therapeutic exercise minutes (CPT 54055): 21 minutes        ASSESSMENT:   Response to treatment: Pt is independent and managing all hip pain, knee pain and exercises well. Pt is appropriate for discharge to independence.     PLAN/RECOMMENDATIONS:   Plan for treatment: no further treatment needed.  Planned interventions for next visit:  Discharge .

## 2024-08-15 NOTE — OP THERAPY DISCHARGE SUMMARY
Outpatient Physical Therapy  DISCHARGE SUMMARY NOTE      [facility] Outpatient Physical Therapy  [address]  [place] [address]  Phone:  [phone]  Fax:  [phone]    Date of Visit: 08/15/2024    Patient: Mini Pham  YOB: 1950  MRN: [id number]     Referring Provider: Max Gonzales M.D.  [address] [place] [address] [address]  [place] [address]   Referring Diagnosis No admission diagnoses are documented for this encounter.         Functional Assessment Used        Your patient is being discharged from Physical Therapy with the following comments:   Independent with home exercise and managing hip pain well.     Comments:  Discharge     Limitations Remaining:  None    Recommendations:  Discharge    Kaushik Reynoso, PT    Date: 8/15/2024

## 2024-08-15 NOTE — PREPROCEDURE INSTRUCTIONS
"Preadmit appointment: \" Preparing for your Procedure information\" sheet given to patient with verbal and written instructions. Patient instructed to continue prescribed medications through the day before surgery, instructed to take the following medications the day of surgery per anesthesia protocol:celexa, lipitor, amlodipine.       Pt states, \"no issues with anesthesia\".  Fasting guidelines per MD's instructions for type of diet and when to initiate NPO status.      All Pt's questions and concerns answered or addressed. Labs EKG and  appt for 2/12/24.  "
room air

## 2024-08-23 ENCOUNTER — HOSPITAL ENCOUNTER (OUTPATIENT)
Dept: LAB | Facility: MEDICAL CENTER | Age: 74
End: 2024-08-23
Attending: FAMILY MEDICINE
Payer: MEDICARE

## 2024-08-23 DIAGNOSIS — I10 ESSENTIAL HYPERTENSION: ICD-10-CM

## 2024-08-23 DIAGNOSIS — I48.0 PAROXYSMAL A-FIB (HCC): ICD-10-CM

## 2024-08-23 DIAGNOSIS — E78.5 DYSLIPIDEMIA: ICD-10-CM

## 2024-08-23 LAB
25(OH)D3 SERPL-MCNC: 38 NG/ML (ref 30–100)
ALBUMIN SERPL BCP-MCNC: 3.9 G/DL (ref 3.2–4.9)
ALBUMIN/GLOB SERPL: 1.4 G/DL
ALP SERPL-CCNC: 123 U/L (ref 30–99)
ALT SERPL-CCNC: 9 U/L (ref 2–50)
ANION GAP SERPL CALC-SCNC: 12 MMOL/L (ref 7–16)
AST SERPL-CCNC: 14 U/L (ref 12–45)
BILIRUB SERPL-MCNC: 0.4 MG/DL (ref 0.1–1.5)
BUN SERPL-MCNC: 10 MG/DL (ref 8–22)
CALCIUM ALBUM COR SERPL-MCNC: 9 MG/DL (ref 8.5–10.5)
CALCIUM SERPL-MCNC: 8.9 MG/DL (ref 8.5–10.5)
CHLORIDE SERPL-SCNC: 107 MMOL/L (ref 96–112)
CHOLEST SERPL-MCNC: 162 MG/DL (ref 100–199)
CO2 SERPL-SCNC: 21 MMOL/L (ref 20–33)
CREAT SERPL-MCNC: 0.5 MG/DL (ref 0.5–1.4)
ERYTHROCYTE [DISTWIDTH] IN BLOOD BY AUTOMATED COUNT: 44.4 FL (ref 35.9–50)
GFR SERPLBLD CREATININE-BSD FMLA CKD-EPI: 99 ML/MIN/1.73 M 2
GLOBULIN SER CALC-MCNC: 2.7 G/DL (ref 1.9–3.5)
GLUCOSE SERPL-MCNC: 93 MG/DL (ref 65–99)
HCT VFR BLD AUTO: 43.3 % (ref 37–47)
HDLC SERPL-MCNC: 51 MG/DL
HGB BLD-MCNC: 14.2 G/DL (ref 12–16)
LDLC SERPL CALC-MCNC: 86 MG/DL
MCH RBC QN AUTO: 27.8 PG (ref 27–33)
MCHC RBC AUTO-ENTMCNC: 32.8 G/DL (ref 32.2–35.5)
MCV RBC AUTO: 84.7 FL (ref 81.4–97.8)
PLATELET # BLD AUTO: 338 K/UL (ref 164–446)
PMV BLD AUTO: 9.6 FL (ref 9–12.9)
POTASSIUM SERPL-SCNC: 4.1 MMOL/L (ref 3.6–5.5)
PROT SERPL-MCNC: 6.6 G/DL (ref 6–8.2)
RBC # BLD AUTO: 5.11 M/UL (ref 4.2–5.4)
SODIUM SERPL-SCNC: 140 MMOL/L (ref 135–145)
TRIGL SERPL-MCNC: 124 MG/DL (ref 0–149)
TSH SERPL-ACNC: 2.85 UIU/ML (ref 0.35–5.5)
WBC # BLD AUTO: 4.8 K/UL (ref 4.8–10.8)

## 2024-08-23 PROCEDURE — 84443 ASSAY THYROID STIM HORMONE: CPT

## 2024-08-23 PROCEDURE — 80053 COMPREHEN METABOLIC PANEL: CPT

## 2024-08-23 PROCEDURE — 82306 VITAMIN D 25 HYDROXY: CPT | Mod: GA

## 2024-08-23 PROCEDURE — 80061 LIPID PANEL: CPT

## 2024-08-23 PROCEDURE — 36415 COLL VENOUS BLD VENIPUNCTURE: CPT

## 2024-08-23 PROCEDURE — 85027 COMPLETE CBC AUTOMATED: CPT

## 2024-09-02 RX ORDER — AMOXICILLIN 500 MG/1
4 TABLET, FILM COATED ORAL SEE ADMIN INSTRUCTIONS
COMMUNITY
Start: 2024-08-07

## 2024-09-05 ENCOUNTER — APPOINTMENT (OUTPATIENT)
Dept: PHYSICAL THERAPY | Facility: MEDICAL CENTER | Age: 74
End: 2024-09-05
Attending: FAMILY MEDICINE
Payer: MEDICARE

## 2024-09-12 ENCOUNTER — APPOINTMENT (OUTPATIENT)
Dept: PHYSICAL THERAPY | Facility: MEDICAL CENTER | Age: 74
End: 2024-09-12
Attending: FAMILY MEDICINE
Payer: MEDICARE

## 2024-09-13 ENCOUNTER — OFFICE VISIT (OUTPATIENT)
Dept: MEDICAL GROUP | Facility: MEDICAL CENTER | Age: 74
End: 2024-09-13
Payer: MEDICARE

## 2024-09-13 VITALS
HEART RATE: 60 BPM | RESPIRATION RATE: 16 BRPM | TEMPERATURE: 98 F | WEIGHT: 214 LBS | BODY MASS INDEX: 35.65 KG/M2 | SYSTOLIC BLOOD PRESSURE: 122 MMHG | OXYGEN SATURATION: 96 % | HEIGHT: 65 IN | DIASTOLIC BLOOD PRESSURE: 68 MMHG

## 2024-09-13 DIAGNOSIS — I48.0 PAROXYSMAL A-FIB (HCC): ICD-10-CM

## 2024-09-13 DIAGNOSIS — E66.9 OBESITY, CLASS II, BMI 35-39.9: ICD-10-CM

## 2024-09-13 DIAGNOSIS — E55.9 VITAMIN D DEFICIENCY: ICD-10-CM

## 2024-09-13 PROCEDURE — 3078F DIAST BP <80 MM HG: CPT | Performed by: FAMILY MEDICINE

## 2024-09-13 PROCEDURE — 3074F SYST BP LT 130 MM HG: CPT | Performed by: FAMILY MEDICINE

## 2024-09-13 PROCEDURE — 99214 OFFICE O/P EST MOD 30 MIN: CPT | Performed by: FAMILY MEDICINE

## 2024-09-13 ASSESSMENT — FIBROSIS 4 INDEX: FIB4 SCORE: 0.99

## 2024-09-13 NOTE — PROGRESS NOTES
Chief Complaint   Patient presents with    Follow-Up     A-fib    Atrial Fibrillation       Subjective:     HPI:   Mini Pham presents today with the followin. Paroxysmal A-fib (HCC)  Patient has history of paroxysmal atrial fibrillation.  This occurred shortly after an episode of COVID.  Patient feels she does occasionally get rapid heartbeat.  Monitoring has shown tachycardia in the past though primarily SVT.  She wonders if she needs to still take the anticoagulant.  She will be seeing cardiology soon and I have asked her to discuss that with them.  Her prescriptions are currently up-to-date.  She is on low-dose carvedilol twice daily.  Explained that twice daily carvedilol is important.  It does not have a full 24-hour half-life.  Recent lab test showed target range thyroid result.  Blood count showed no anemia or infection.  Blood salts, kidney function, blood proteins and liver enzymes are normal.  She has mild alkaline phosphatase elevation but it is stable and relatively mild and likely due to fatty liver.    2. Obesity, Class II, BMI 35-39.9  Patient's weight remains stable.  She has maintained her greater than 40 pound weight loss.  She is congratulated.  She is working on continuing good diet and exercise.    3. Vitamin D deficiency  Reviewed  lab results.  Vitamin D level is a little lower but still low normal.  Have advised patient to continue her vitamin D supplementation.        Patient Active Problem List    Diagnosis Date Noted    Paroxysmal A-fib (HCC) 03/15/2024    Presence of total knee joint prosthesis, left 03/15/2024    Arthritis of left knee 2024    History of iron deficiency anemia 10/18/2023    Obesity, Class II, BMI 35-39.9 10/18/2023    Intestinal metaplasia of stomach 10/03/2022    Vitamin D deficiency 2020    Major depression in remission (HCC) 10/08/2019    LUCY (generalized anxiety disorder) 10/08/2019    Atrophic gastritis without hemorrhage 10/08/2019     "Essential hypertension 02/25/2019    Dyslipidemia 02/25/2019    Primary osteoarthritis of both knees 02/25/2019       Current medicines (including changes today)  Current Outpatient Medications   Medication Sig Dispense Refill    Amoxicillin 500 MG Tab Take 4 Tablets by mouth see administration instructions. Take 1 hour prior to dental procedure or other invasive procedure.      apixaban (ELIQUIS) 5mg Tab Take 1 Tablet by mouth 2 times a day. 180 Tablet 1    atorvastatin (LIPITOR) 10 MG Tab Take 1 Tablet by mouth every day. 90 Tablet 3    citalopram (CELEXA) 20 MG Tab Take 1 Tablet by mouth every day. 90 Tablet 3    carvedilol (COREG) 3.125 MG Tab Take 1 Tablet by mouth 2 times a day with meals. 180 Tablet 3    amLODIPine (NORVASC) 10 MG Tab TAKE 1 TABLET DAILY (DOSE  INCREASE) (Patient taking differently: Take 10 mg by mouth every day.) 90 Tablet 3    losartan (COZAAR) 100 MG Tab TAKE 1 TABLET DAILY; DOSE  INCREASE (Patient taking differently: Take 100 mg by mouth every day.) 90 Tablet 3    VITAMIN D PO Take 1 Tablet by mouth every day.      Cyanocobalamin (VITAMIN B 12 PO) Take 2,500 mcg by mouth every day.       No current facility-administered medications for this visit.       No Known Allergies    ROS: As per HPI       Objective:     /68   Pulse 60   Temp 36.7 °C (98 °F)   Resp 16   Ht 1.651 m (5' 5\")   Wt 97.1 kg (214 lb)   SpO2 96%  Body mass index is 35.61 kg/m².    Physical Exam:  Constitutional: Well-developed and well-nourished. Not diaphoretic. No distress. Lucid and fluent.  Skin: Skin is warm and dry. No rash noted.  Head: Atraumatic without lesions.  Eyes: Conjunctivae and extraocular motions are normal. Pupils are equal, round, and reactive to light. No scleral icterus.   Ears:  External ears unremarkable.   Neck: Supple, trachea midline. No thyromegaly present. No cervical or supraclavicular lymphadenopathy. No JVD or carotid bruits appreciated  Cardiovascular: Regular rate and rhythm.  " Normal S1, S2 without murmur appreciated.  Chest: Effort normal. Clear to auscultation throughout. No adventitious sounds.   Abdomen: Soft, non tender, and without distention. Active bowel sounds in all four quadrants. No rebound, guarding, masses or hepatosplenomegaly.  Extremities: No cyanosis, clubbing, erythema, nor edema.   Neurological: Alert and oriented x 3. No tremor appreciated  Psychiatric:  Behavior, mood, and affect are appropriate.    Echocardiogram reviewed and discussed.  Lab results from August reviewed and discussed.       Assessment and Plan:     74 y.o. female with the following issues:    1. Paroxysmal A-fib (HCC)        2. Obesity, Class II, BMI 35-39.9        3. Vitamin D deficiency              Followup: Return in about 6 months (around 3/13/2025), or if symptoms worsen or fail to improve.

## 2024-09-19 ENCOUNTER — APPOINTMENT (OUTPATIENT)
Dept: PHYSICAL THERAPY | Facility: MEDICAL CENTER | Age: 74
End: 2024-09-19
Attending: FAMILY MEDICINE
Payer: MEDICARE

## 2024-09-24 ENCOUNTER — APPOINTMENT (OUTPATIENT)
Dept: PHYSICAL THERAPY | Facility: MEDICAL CENTER | Age: 74
End: 2024-09-24
Attending: FAMILY MEDICINE
Payer: MEDICARE

## 2024-09-27 ENCOUNTER — APPOINTMENT (OUTPATIENT)
Dept: PHYSICAL THERAPY | Facility: MEDICAL CENTER | Age: 74
End: 2024-09-27
Attending: FAMILY MEDICINE
Payer: MEDICARE

## 2024-10-01 ENCOUNTER — APPOINTMENT (OUTPATIENT)
Dept: PHYSICAL THERAPY | Facility: MEDICAL CENTER | Age: 74
End: 2024-10-01
Attending: FAMILY MEDICINE
Payer: MEDICARE

## 2024-10-03 ENCOUNTER — APPOINTMENT (OUTPATIENT)
Dept: PHYSICAL THERAPY | Facility: MEDICAL CENTER | Age: 74
End: 2024-10-03
Attending: FAMILY MEDICINE
Payer: MEDICARE

## 2024-10-04 ENCOUNTER — APPOINTMENT (OUTPATIENT)
Dept: MEDICAL GROUP | Facility: MEDICAL CENTER | Age: 74
End: 2024-10-04
Payer: MEDICARE

## 2024-11-14 DIAGNOSIS — Z79.01 CHRONIC ANTICOAGULATION: ICD-10-CM

## 2024-11-17 DIAGNOSIS — I10 ESSENTIAL HYPERTENSION: ICD-10-CM

## 2024-11-18 RX ORDER — AMLODIPINE BESYLATE 10 MG/1
10 TABLET ORAL DAILY
Qty: 90 TABLET | Refills: 3 | Status: SHIPPED | OUTPATIENT
Start: 2024-11-18

## 2024-11-18 NOTE — TELEPHONE ENCOUNTER
Received request via: Pharmacy    Was the patient seen in the last year in this department? Yes    Does the patient have an active prescription (recently filled or refills available) for medication(s) requested? No    Pharmacy Name: [facility] - [place] [address] - [facility] AT Portal to [facility] Sites     Does the patient have FPC Plus and need 100-day supply? (This applies to ALL medications) Patient does not have SCP

## 2024-11-18 NOTE — TELEPHONE ENCOUNTER
Received request via: Pharmacy    Was the patient seen in the last year in this department? Yes    Does the patient have an active prescription (recently filled or refills available) for medication(s) requested? No    Pharmacy Name: [facility] - [place] - [facility] AT Portal to [facility] Sites     Does the patient have detention Plus and need 100-day supply? (This applies to ALL medications) Patient does not have SCP

## 2024-12-12 ENCOUNTER — APPOINTMENT (OUTPATIENT)
Dept: MEDICAL GROUP | Facility: MEDICAL CENTER | Age: 74
End: 2024-12-12
Payer: MEDICARE

## 2024-12-16 DIAGNOSIS — Z79.01 CHRONIC ANTICOAGULATION: ICD-10-CM

## 2024-12-22 ENCOUNTER — PATIENT MESSAGE (OUTPATIENT)
Dept: CARDIOLOGY | Facility: MEDICAL CENTER | Age: 74
End: 2024-12-22
Payer: MEDICARE

## 2024-12-23 ENCOUNTER — PATIENT MESSAGE (OUTPATIENT)
Dept: CARDIOLOGY | Facility: MEDICAL CENTER | Age: 74
End: 2024-12-23
Payer: MEDICARE

## 2024-12-24 ENCOUNTER — PATIENT MESSAGE (OUTPATIENT)
Dept: CARDIOLOGY | Facility: MEDICAL CENTER | Age: 74
End: 2024-12-24
Payer: MEDICARE

## 2024-12-24 DIAGNOSIS — I48.0 PAROXYSMAL A-FIB (HCC): ICD-10-CM

## 2024-12-24 NOTE — PATIENT COMMUNICATION
HK: Please advise regarding Dizzy spells and Coreg.  See patient HR readings and recent BP checks.

## 2024-12-30 ENCOUNTER — PATIENT MESSAGE (OUTPATIENT)
Dept: CARDIOLOGY | Facility: MEDICAL CENTER | Age: 74
End: 2024-12-30

## 2024-12-30 ENCOUNTER — ANTICOAGULATION VISIT (OUTPATIENT)
Dept: VASCULAR LAB | Facility: MEDICAL CENTER | Age: 74
End: 2024-12-30
Attending: INTERNAL MEDICINE
Payer: MEDICARE

## 2024-12-30 DIAGNOSIS — Z79.01 CHRONIC ANTICOAGULATION: ICD-10-CM

## 2024-12-30 DIAGNOSIS — I48.0 PAROXYSMAL A-FIB (HCC): ICD-10-CM

## 2024-12-30 PROCEDURE — 99212 OFFICE O/P EST SF 10 MIN: CPT

## 2024-12-30 NOTE — PROGRESS NOTES
Target end date: Indefinite  Indication: pAfib  Drug: Eliquis 5 mg BID  CHADsVASC = 3 (age, sex, HTN)  HAS-BLED = 1 (age)    Health Status Since Last Assessment  Pt denies any new relevant medical problems, ED visits or hospitalizations  - reports ED visit in June/July d/t cat scratch that wouldn't stop bleeding  Pt denies any embolic events (stroke/tia/systemic embolism)      Adherence with DOAC Therapy  Pt has not missed doses in the average week.  DOAC is affordable    Bleeding Risk Assessment  Pt reports occasional epistaxis  Pt denies any hematuria  Pt denies any excessive or unusual bleeding/hematomas.  Pt denies any GI bleeds or hematemesis.  Pt denies any concerning daily headache or subdural hematoma symptoms.    Latest Hemoglobin: WNL  Hemoglobin   Date Value Ref Range Status   08/23/2024 14.2 12.0 - 16.0 g/dL Final     Latest Platelets: WNL  Platelet Count   Date Value Ref Range Status   08/23/2024 338 164 - 446 K/uL Final          Creatinine Clearance/Renal Function  Latest CrCl: >100 ml/min    Hepatic function  Latest LFTs: WNL   Pt denies any history of liver dysfunction   Pt reports  ETOH overuse - drinking 3-4 drinks/week     Drug Interactions  ASA/other antiplatelets: None  NSAID: None  Other drug interactions: None  Verified no anticonvulsant or azole therapy, education provided for future use.     Examination  Blood Pressure  Declined  There were no vitals filed for this visit.  Symptomatic hypotension: Denies   Significant gait impairment/imbalance/high risk for falls? Pt ambulates without problem, does report occasional dizziness/lightheadedness    Final Assessment and Recommendations:  Patient appears stable from the anticoagulation standpoint.    Benefits of continued DOAC therapy outweigh risks for this patient  Recommend pt continue with current DOAC therapy: Eliquis 5 mg BID       Other Actions: CMP/CBC hemogram ordered prior to next visit    Follow up:  Will follow up with patient 3  month(s).     Dillan Bradley, PharmD

## 2024-12-30 NOTE — PATIENT COMMUNICATION
Anthony Ling M.D.  JERAMIE Garvey16 minutes ago (9:47 AM)     Not seeing any numbers too concerning from the pics she sent.  However, let's have her wear 14 day event monitor and follow up with soonest appointment - okay to see any DANIAL.  If dizziness continues, recommend ER visit.     14-day ZIO order placed, MYCHART response sent to pt.

## 2025-01-02 ENCOUNTER — OFFICE VISIT (OUTPATIENT)
Dept: CARDIOLOGY | Facility: MEDICAL CENTER | Age: 75
End: 2025-01-02
Attending: PHYSICIAN ASSISTANT
Payer: MEDICARE

## 2025-01-02 VITALS
WEIGHT: 217 LBS | DIASTOLIC BLOOD PRESSURE: 74 MMHG | RESPIRATION RATE: 16 BRPM | HEART RATE: 57 BPM | OXYGEN SATURATION: 96 % | SYSTOLIC BLOOD PRESSURE: 136 MMHG | BODY MASS INDEX: 36.15 KG/M2 | HEIGHT: 65 IN

## 2025-01-02 DIAGNOSIS — H81.10 BENIGN PAROXYSMAL POSITIONAL VERTIGO, UNSPECIFIED LATERALITY: ICD-10-CM

## 2025-01-02 DIAGNOSIS — H91.93 BILATERAL HEARING LOSS, UNSPECIFIED HEARING LOSS TYPE: ICD-10-CM

## 2025-01-02 DIAGNOSIS — I48.0 PAROXYSMAL A-FIB (HCC): ICD-10-CM

## 2025-01-02 PROCEDURE — 99214 OFFICE O/P EST MOD 30 MIN: CPT | Performed by: PHYSICIAN ASSISTANT

## 2025-01-02 PROCEDURE — 99212 OFFICE O/P EST SF 10 MIN: CPT | Performed by: PHYSICIAN ASSISTANT

## 2025-01-02 PROCEDURE — 3075F SYST BP GE 130 - 139MM HG: CPT | Performed by: PHYSICIAN ASSISTANT

## 2025-01-02 PROCEDURE — 3078F DIAST BP <80 MM HG: CPT | Performed by: PHYSICIAN ASSISTANT

## 2025-01-02 PROCEDURE — 93005 ELECTROCARDIOGRAM TRACING: CPT | Mod: TC | Performed by: PHYSICIAN ASSISTANT

## 2025-01-02 ASSESSMENT — ENCOUNTER SYMPTOMS
HEADACHES: 0
SENSORY CHANGE: 0
LOSS OF CONSCIOUSNESS: 0
WEAKNESS: 0
PND: 0
COUGH: 0
SHORTNESS OF BREATH: 0
PALPITATIONS: 0
FALLS: 0
FOCAL WEAKNESS: 0
DIZZINESS: 1
ORTHOPNEA: 0

## 2025-01-02 ASSESSMENT — FIBROSIS 4 INDEX: FIB4 SCORE: 0.99

## 2025-01-02 NOTE — PROGRESS NOTES
Chief Complaint   Patient presents with    Follow-Up     Dx: Paroxysmal A-fib (HCC)             Subjective:   Mini Pham is a 74 y.o. female who presents today for follow-up.     Patient of Dr. Ling.  Current medical problems include PVCs, dyslipidemia, hypertension, paroxysmal A-fib.  Last clinic visit with Dr. Ling was July 2024. She is here today for urgent follow-up for increased dizziness.    Mini describes episodes of room spinning dizziness. First occurred while getting out of bed, this was severe room spinning sensation that improved after a few minutes. She is now careful with this movement but still notices it when she is turning her head to the right. She also has this when she bends over to tie her shoes. She denies neck pain, headaches, vision changes, gait instability or poor coordination. She does have chronic hearing loss and some new ear fullness of the left ear.     Past Medical History:   Diagnosis Date    Acute nasopharyngitis 12/21/2023    Anemia, pernicious     Anxiety     Arrhythmia     PVC in past    Arthritis 2005    Atrophic gastritis     Bronchitis     High cholesterol 2000    History of iron deficiency anemia 10/18/2023    Hyperlipidemia     Hypertension     Infectious disease     Intestinal metaplasia of stomach 10/03/2022    Osteoarthritis     Pain 2005    Arthritis pain    Paroxysmal A-fib (HCC) 03/15/2024    Prediabetes     fasting glucose 102    Pregnant 1973    Termination    Presence of total knee joint prosthesis, left 03/15/2024    Tendonitis     chronic, elbows and wrists         Family History   Problem Relation Age of Onset    Alzheimer's Disease Mother 50        early onset    Cancer Father 50        laryngeal, mets to bladder    Heart Attack Sister 56    Diabetes Sister     Hyperlipidemia Sister     Depression Sister     Colon Cancer Sister 69    Depression Sister     Cancer Maternal Aunt 50        colon    No Known Problems Maternal Grandmother     Diabetes  Maternal Grandfather         late onset    No Known Problems Paternal Grandmother     No Known Problems Paternal Grandfather     Hypertension Neg Hx          Social History     Tobacco Use    Smoking status: Former     Current packs/day: 0.00     Average packs/day: 1 pack/day for 30.0 years (30.0 ttl pk-yrs)     Types: Cigarettes     Start date:      Quit date:      Years since quittin.0    Smokeless tobacco: Never   Vaping Use    Vaping status: Never Used   Substance Use Topics    Alcohol use: Yes     Alcohol/week: 0.6 oz     Types: 1 Glasses of wine per week     Comment: 3 times/week    Drug use: Not Currently     Types: Cocaine, Marijuana     Comment: Remote, 50 years ago         No Known Allergies      Current Outpatient Medications   Medication Sig    amLODIPine (NORVASC) 10 MG Tab Take 1 Tablet by mouth every day.    Amoxicillin 500 MG Tab Take 4 Tablets by mouth see administration instructions. Take 1 hour prior to dental procedure or other invasive procedure.    apixaban (ELIQUIS) 5mg Tab Take 1 Tablet by mouth 2 times a day.    atorvastatin (LIPITOR) 10 MG Tab Take 1 Tablet by mouth every day.    citalopram (CELEXA) 20 MG Tab Take 1 Tablet by mouth every day.    carvedilol (COREG) 3.125 MG Tab Take 1 Tablet by mouth 2 times a day with meals.    losartan (COZAAR) 100 MG Tab TAKE 1 TABLET DAILY; DOSE  INCREASE (Patient taking differently: Take 100 mg by mouth every day.)    VITAMIN D PO Take 1 Tablet by mouth every day.    Cyanocobalamin (VITAMIN B 12 PO) Take 2,500 mcg by mouth every day.         Review of Systems   Constitutional:  Negative for malaise/fatigue.   HENT:  Positive for ear pain and hearing loss. Negative for tinnitus.    Respiratory:  Negative for cough and shortness of breath.    Cardiovascular:  Negative for chest pain, palpitations, orthopnea, leg swelling and PND.   Musculoskeletal:  Negative for falls.   Neurological:  Positive for dizziness. Negative for sensory change,  "focal weakness, loss of consciousness, weakness and headaches.          Objective:   /74 (BP Location: Left arm, Patient Position: Sitting)   Pulse (!) 57   Resp 16   Ht 1.651 m (5' 5\")   Wt 98.4 kg (217 lb)   SpO2 96%  Body mass index is 36.11 kg/m².         Physical Exam  Vitals reviewed.   HENT:      Head: Normocephalic and atraumatic.   Cardiovascular:      Rate and Rhythm: Regular rhythm. Bradycardia present.      Heart sounds: No murmur heard.  Pulmonary:      Effort: Pulmonary effort is normal. No respiratory distress.      Breath sounds: No rales.   Musculoskeletal:      Right lower leg: No edema.      Left lower leg: No edema.   Skin:     General: Skin is warm and dry.   Neurological:      Mental Status: She is alert and oriented to person, place, and time.      Gait: Gait normal.      Comments: No nystagmus noted   Psychiatric:         Judgment: Judgment normal.            Assessment:     1. Paroxysmal A-fib (HCC)  EKG      2. Benign paroxysmal positional vertigo, unspecified laterality  Referral to Physical Therapy      3. Bilateral hearing loss, unspecified hearing loss type  Referral to ENT           Medical Decision Making:  Today's Assessment / Plan:   Vertigo  - Elicited with turning head to left. Based on her history I think BPPV is most likely the diagnosis. She does have chronic hearing loss (no change in this with the paroxysmal vertigo) and some ear fullness in the left ear. She is in sinus rhythm today and we both agree this is likely not related to afib. She can hold on the event monitor for now as she is not having any symptomatic dizziness.   Referrals placed to ENT/audiology for her chronic hearing loss as requested by patient and to vestibular PT.    PAF  - cont carvedilol and apixaban.      HTN, controlled  - amlodipine, losartan    Future Appointments   Date Time Provider Department Center   1/8/2025 10:00 AM CARDIAC EVENT MONITOR-MAIL TO PATIENT [facility] None   2/19/2025  9:15 " MARLYN Ling M.D. [facility] None   3/14/2025 10:40 AM Max Gonzales M.D. [address]   3/31/2025 10:15 AM [facility] EXAM 4 [facility] None       No follow-ups on file.  Sooner if problems.

## 2025-01-03 LAB — EKG IMPRESSION: NORMAL

## 2025-01-03 PROCEDURE — 93010 ELECTROCARDIOGRAM REPORT: CPT | Performed by: INTERNAL MEDICINE

## 2025-02-06 ENCOUNTER — HOSPITAL ENCOUNTER (OUTPATIENT)
Dept: RADIOLOGY | Facility: MEDICAL CENTER | Age: 75
End: 2025-02-06
Attending: FAMILY MEDICINE
Payer: MEDICARE

## 2025-02-06 DIAGNOSIS — Z12.31 VISIT FOR SCREENING MAMMOGRAM: ICD-10-CM

## 2025-02-06 PROCEDURE — 77067 SCR MAMMO BI INCL CAD: CPT

## 2025-02-08 DIAGNOSIS — I48.91 ATRIAL FIBRILLATION WITH RVR (HCC): ICD-10-CM

## 2025-02-08 DIAGNOSIS — I48.0 PAROXYSMAL A-FIB (HCC): ICD-10-CM

## 2025-02-10 RX ORDER — APIXABAN 5 MG/1
5 TABLET, FILM COATED ORAL 2 TIMES DAILY
Qty: 180 TABLET | Refills: 1 | Status: SHIPPED | OUTPATIENT
Start: 2025-02-10

## 2025-02-10 NOTE — TELEPHONE ENCOUNTER
Received request via: Pharmacy    Was the patient seen in the last year in this department? Yes    Does the patient have an active prescription (recently filled or refills available) for medication(s) requested? No    Pharmacy Name:   To be filled at: [facility]/pharmacy #[id number] - [place], [place] - [address]              Does the patient have MCFP Plus and need 100-day supply? (This applies to ALL medications) Patient does not have SCP

## 2025-02-13 NOTE — OP THERAPY EVALUATION
Outpatient Physical Therapy  VESTIBULAR EVALUATION    [facility] Outpatient Physical Therapy  [address]  [place] [address]  Phone:  [phone]  Fax:  [phone]    Date of Evaluation: 2025    Patient: Mini Pham  YOB: 1950  MRN: [id number]     Referring Provider: Myriam Valle P.A.-C.  [address]  [address]  [place] [address]   Referring Diagnosis: Benign paroxysmal vertigo, unspecified ear [H81.10]     Time Calculation    Start time: 1330  Stop time: 1415 Time Calculation (min): 45 minutes           Chief Complaint: Vertigo    Visit Diagnoses     ICD-10-CM   1. BPPV (benign paroxysmal positional vertigo), unspecified laterality  H81.10         History of Present Illness:     Mechanism of injury:    Mini presents to PT for support in managing her dizziness. PMH includes PVCs, dyslipidemia, hypertension, paroxysmal A-fib.      Mini reports the onset of spinning vertigo about 1 month ago. This is movement induced: initially when she got out of bed, now with all bed mobility (L>R), tipping the head quickly or quick STS. This lasts for seconds. Does not have subsequent nausea. Also endorses hearing loss and tinnitus which is new on the L side. Has an ENT consult scheduled for March. Does cause her to be aware of her balance, but has not caused falls.     Prior level of function:  Retired. Hobbies: piano, exercising (water aerobics, mindful movements, weight training, walking).  Symptoms:     Current symptom ratin    At best symptom ratin    At worst symptom rating:  10  Treatments:     No prior treatments received    Patient goals:     Other patient goals:  Resolve dizziness      Past Medical History:   Diagnosis Date    Acute nasopharyngitis 2023    Anemia, pernicious     Anxiety     Arrhythmia     PVC in past    Arthritis 2005    Atrophic gastritis     Bronchitis     High cholesterol 2000    History of iron  deficiency anemia 10/18/2023    Hyperlipidemia     Hypertension     Infectious disease     Intestinal metaplasia of stomach 10/03/2022    Osteoarthritis     Pain 2005    Arthritis pain    Paroxysmal A-fib (HCC) 03/15/2024    Prediabetes     fasting glucose 102    Pregnant 1973    Termination    Presence of total knee joint prosthesis, left 03/15/2024    Tendonitis     chronic, elbows and wrists     Past Surgical History:   Procedure Laterality Date    ARTHROPLASTY, KNEE, ROBOT-ASSISTED Left 2024    Procedure: LEFT TOTAL KNEE ARTHROPLASTY;  Surgeon: Chantelle Benton M.D.;  Location: SURGERY [facility];  Service: Ortho Robotic    CHOLECYSTECTOMY       Social History     Tobacco Use    Smoking status: Former     Current packs/day: 0.00     Average packs/day: 1 pack/day for 30.0 years (30.0 ttl pk-yrs)     Types: Cigarettes     Start date:      Quit date:      Years since quittin.1    Smokeless tobacco: Never   Substance Use Topics    Alcohol use: Yes     Alcohol/week: 0.6 oz     Types: 1 Glasses of wine per week     Comment: 3 times/week     Family and Occupational History     Socioeconomic History    Marital status: Single     Spouse name: Not on file    Number of children: Not on file    Years of education: Not on file    Highest education level: Associate degree: occupational, technical, or vocational program   Occupational History    Occupation: retired     Comment:  in [place] and [place]       Objective:    Gait:     Assessment: difficulty with concurrent head rotation  Vestibulospinal Exam:     Comments: Deferred  Oculomotor Exam:         Details: No spontaneous nystagmus central gaze          Details: No spontaneous nystagmus eccentric gaze    No saccadic eye movements    Smooth pursuit present    Convergence:        Normal convergence  Active Range of Motion:     Within functional limits    Active range of motion comments: Dizziness reported with moving into cervical  extension  Limb Ataxia Exam:     Finger-to-Nose:         Intention tremor: none    Dysdiadochokinesia: None]  Strength Exam:     Upper extremities within functional limits    Lower extremities within functional limits  Vestibulo-Ocular Exam:   Normal head thrust test  BPPV Exam:     Abnormal [place]-Hallpike        Latency (sec): 2        Duration (sec): 14        Findings: positive right, fatigable and torsional nystagmus    Normal straight head-hanging test    Normal roll test        Therapeutic Treatments and Modalities:     1. Canalith Repositioning (CPT 12287), R Epley completed x 3. Unsucessful first roll indicated by reversal upon sitting. Better 2nd attempt emphasizing chin tuck and head shake. Nystagmus approx 75% improved on the final CRM and no reversal upon sitting. BPPV education/pxns reviewed. HO provided    Time-based treatments/modalities:             Assessment:     Assessment details:    Mini is a 75 y.o female who presents to PT with complaint of intermittent vertigo related to position change that began approx 1 month ago. PT evaluation was positive for nystagmus in the R tuan hallpike indicating R posterior canalithasis type BPPV. The problem interferes with her tolerance for bed mobility and quick position change, as well as places her at increased risk of falls. Skilled PT services are indicated to ensure the BPPV fully resolves, as well as address residual imbalance and motion sensitivity via VRT techniques in order to enhance QOL.         Prognosis: good    Goals:     Short term goals:    - Resolve nystagmus with positional exam  - Complete balance screening      Short term goal time span:  1-2 weeks    Long term goals:    - Improve DHI to 2% or better  - Balance measures indicate low risk of falls  - Pt demonstrates independence with a HEP for continued management of this problem beyond discharge from therapy.       Long term goal time span:  6-8 weeks  Plan:     Therapy options:  Physical  therapy treatment to continue    Planned therapy interventions:  Canalith Repositioning (CPT 41995), Self Care ADL Training (CPT 01130), Therapeutic Activities (CPT 78942), Therapeutic Exercise (CPT 64624), Gait Training (CPT 00453) and Neuromuscular Re-education (CPT 43257)    Frequency: 1-2x/week.    Duration in weeks:  8    Discussed with:  Patient      Functional Assessment Used    DHI= 10%      Referring provider co-signature:  I have reviewed this plan of care and my co-signature certifies the need for services.    Certification Period: 02/14/2025 to  04/11/25    Physician Signature: ________________________________ Date: ______________

## 2025-02-14 ENCOUNTER — PHYSICAL THERAPY (OUTPATIENT)
Dept: PHYSICAL THERAPY | Facility: MEDICAL CENTER | Age: 75
End: 2025-02-14
Attending: PHYSICIAN ASSISTANT
Payer: MEDICARE

## 2025-02-14 DIAGNOSIS — H81.10 BPPV (BENIGN PAROXYSMAL POSITIONAL VERTIGO), UNSPECIFIED LATERALITY: ICD-10-CM

## 2025-02-14 PROCEDURE — 95992 CANALITH REPOSITIONING PROC: CPT

## 2025-02-14 PROCEDURE — 97162 PT EVAL MOD COMPLEX 30 MIN: CPT

## 2025-02-14 ASSESSMENT — ENCOUNTER SYMPTOMS
PAIN SCALE AT LOWEST: 0
PAIN SCALE: 0
PAIN SCALE AT HIGHEST: 10

## 2025-02-18 ASSESSMENT — ENCOUNTER SYMPTOMS
NIGHT SWEATS: 0
VOMITING: 0
FEVER: 0
ORTHOPNEA: 0
DIZZINESS: 0
PND: 0
ABDOMINAL PAIN: 0
SYNCOPE: 0
COUGH: 0
IRREGULAR HEARTBEAT: 0
WEAKNESS: 0
DIARRHEA: 0
PALPITATIONS: 0
NAUSEA: 0
FOCAL WEAKNESS: 0
SHORTNESS OF BREATH: 0
DYSPNEA ON EXERTION: 0
WHEEZING: 0
NEAR-SYNCOPE: 0

## 2025-02-19 ENCOUNTER — OFFICE VISIT (OUTPATIENT)
Dept: CARDIOLOGY | Facility: MEDICAL CENTER | Age: 75
End: 2025-02-19
Attending: STUDENT IN AN ORGANIZED HEALTH CARE EDUCATION/TRAINING PROGRAM
Payer: MEDICARE

## 2025-02-19 VITALS
SYSTOLIC BLOOD PRESSURE: 124 MMHG | WEIGHT: 218 LBS | HEART RATE: 64 BPM | HEIGHT: 65 IN | OXYGEN SATURATION: 96 % | BODY MASS INDEX: 36.32 KG/M2 | DIASTOLIC BLOOD PRESSURE: 78 MMHG

## 2025-02-19 DIAGNOSIS — D68.69 HYPERCOAGULABLE STATE DUE TO PAROXYSMAL ATRIAL FIBRILLATION (HCC): ICD-10-CM

## 2025-02-19 DIAGNOSIS — I48.0 PAROXYSMAL A-FIB (HCC): ICD-10-CM

## 2025-02-19 DIAGNOSIS — I48.0 HYPERCOAGULABLE STATE DUE TO PAROXYSMAL ATRIAL FIBRILLATION (HCC): ICD-10-CM

## 2025-02-19 DIAGNOSIS — I10 ESSENTIAL HYPERTENSION: ICD-10-CM

## 2025-02-19 DIAGNOSIS — E78.5 DYSLIPIDEMIA: ICD-10-CM

## 2025-02-19 PROCEDURE — 99214 OFFICE O/P EST MOD 30 MIN: CPT | Performed by: STUDENT IN AN ORGANIZED HEALTH CARE EDUCATION/TRAINING PROGRAM

## 2025-02-19 PROCEDURE — 99212 OFFICE O/P EST SF 10 MIN: CPT | Performed by: STUDENT IN AN ORGANIZED HEALTH CARE EDUCATION/TRAINING PROGRAM

## 2025-02-19 PROCEDURE — 3074F SYST BP LT 130 MM HG: CPT | Performed by: STUDENT IN AN ORGANIZED HEALTH CARE EDUCATION/TRAINING PROGRAM

## 2025-02-19 PROCEDURE — 3078F DIAST BP <80 MM HG: CPT | Performed by: STUDENT IN AN ORGANIZED HEALTH CARE EDUCATION/TRAINING PROGRAM

## 2025-02-19 ASSESSMENT — FIBROSIS 4 INDEX: FIB4 SCORE: 1.01

## 2025-02-19 NOTE — OP THERAPY DAILY TREATMENT
Outpatient Physical Therapy  DAILY TREATMENT     [facility] Outpatient Physical Therapy  [address]  [place] [address]  Phone:  [phone]  Fax:  [phone]    Date: 02/20/2025    Patient: Mini Pham  YOB: 1950  MRN: [id number]     Time Calculation    Start time: 1000  Stop time: 1045 Time Calculation (min): 45 minutes         Chief Complaint: Vertigo    Visit #: 2    SUBJECTIVE:  I would say I'm 95% improved. I don't get dizzy at night anymore. With regard to balance, I don't feel 100% secure. Relates this to chronic OA and TKA.     OBJECTIVE:    R tuan-hallpike positive for 2 beat R torsional nystagmus.     Therapeutic Treatments and Modalities:     1. Canalith Repositioning (CPT 01631), R Epley completed x 2. Nystagmus fully resolved in B tuan hallpike, hang and roll to follow    Therapeutic Treatment and Modalities Summary:   [place] Sensory Organization Performance (SOP) test:  1) Standard Eyes Open= N  2) Standard Eyes Closed= N  3) Modified Tandem Eyes Open= N  4) Modified Tandem Eyes Closed= S  5) Foam Eyes Open= N  6) Foam Eyes Closed= S  7) [place] Step= NT    Key: N=Normal, S= Sway, F= Fall, R= Right, L= Left    Functional Gait Analysis  1) Gait level surface: 3  2) Change in gait speed: 3  3) Gait with horizontal head turns: 3  4) Gait with vertical head turns: 3  5) Gait and pivot turn: 3  6) Step over obstacle: 3  7) Gait with narrow DAREK: 2  8) Gait with eyes closed: 2  9) Ambulating backwards: 2  10) Steps: 2    Total= 26/30    Access Code: [email or web address]  URL: [email or web address]  Date: 02/20/2025  Prepared by:     Exercises  - Body [place] Sway  - 5 x weekly - 1 min each hold  - Wide Tandem Stance with Eyes Closed  - 5 x weekly - 1 minute hold    Time-based treatments/modalities:    Physical Therapy Timed Treatment Charges  Neuromusc re-ed, balance, coor, post minutes (CPT 53992): 30 minutes    ASSESSMENT:   Response to treatment:  Very mild R PC BPPV remained, but appears to have fully cleared after the above treatment. Risk of falls on the FGA is low, but does show apprehension and instability in EC conditions. HEP established. Anticipate a follow up in 2 weeks to ensure resolution and support progression of HEP.     PLAN/RECOMMENDATIONS:   Plan for treatment: therapy treatment to continue next visit.  Planned interventions for next visit: continue with current treatment.

## 2025-02-19 NOTE — PROGRESS NOTES
Cardiology Follow-up Consultation Note    Date of note:   02/19/25  Primary Care Provider: Max Gonzales M.D.    Patient Name: Mini Pham     YOB: 1950  MRN:              [id number]    Chief Complaint: A-fib    History of Present Illness: Ms. Mini Pham is a 75 y.o. female whose current medical problems include PVCs, dyslipidemia, hypertension who is here for follow-up A-fib.    The patient was last seen in my clinic on  07/16/24.  The patient then followed with Myriam Valle PA-C, 1/2/2025 due to dizziness.  Dizziness was thought secondary to vertigo.  No changes were made to her medications.    The patient presents today for follow-up.The patient reports feeling very well today.  She is getting treatment for her vertigo, and no longer has any symptoms.  She continues to do water aerobics 3 times a week without any problems.  She denies any chest pain or shortness of breath on exertion.  No orthopnea, PND, or leg swelling.  No palpitations.  No syncope or presyncopal episodes.    Cardiovascular Risk Factors:  1. Smoking status: Former smoker  2. Type II Diabetes Mellitus: None  Lab Results   Component Value Date/Time    HBA1C 5.2 02/26/2021 09:27 AM    HBA1C 5.5 09/02/2020 08:30 AM     3. Hypertension: On medication  4. Dyslipidemia: On statin  Lab Results   Component Value Date/Time    CHOLSTRLTOT 170 04/09/2024 0957    TRIGLYCERIDE 135 04/09/2024 0957    HDL 52 04/09/2024 0957    LDL 91 04/09/2024 0957     5. Family history of early Coronary Artery Disease in a first degree relative (Male less than 55 years of age; Female less than 65 years of age): Sister with MI  6.  Obesity and/or Metabolic Syndrome: Body mass index is 36.28 kg/m².  7. Sedentary lifestyle: Active    Review of Systems   Constitutional: Negative for fever, malaise/fatigue and night sweats.   Cardiovascular:  Negative for chest pain, dyspnea on exertion, irregular heartbeat, leg swelling, near-syncope,  "orthopnea, palpitations, paroxysmal nocturnal dyspnea and syncope.   Respiratory:  Negative for cough, shortness of breath and wheezing.    Gastrointestinal:  Negative for abdominal pain, diarrhea, nausea and vomiting.   Neurological:  Negative for dizziness, focal weakness and weakness.         All other systems reviewed and are negative.       Current Outpatient Medications   Medication Sig Dispense Refill    ELIQUIS 5 MG Tab TAKE 1 TABLET BY MOUTH TWICE A  Tablet 1    amLODIPine (NORVASC) 10 MG Tab Take 1 Tablet by mouth every day. 90 Tablet 3    Amoxicillin 500 MG Tab Take 4 Tablets by mouth see administration instructions. Take 1 hour prior to dental procedure or other invasive procedure.      atorvastatin (LIPITOR) 10 MG Tab Take 1 Tablet by mouth every day. 90 Tablet 3    citalopram (CELEXA) 20 MG Tab Take 1 Tablet by mouth every day. 90 Tablet 3    carvedilol (COREG) 3.125 MG Tab Take 1 Tablet by mouth 2 times a day with meals. 180 Tablet 3    losartan (COZAAR) 100 MG Tab TAKE 1 TABLET DAILY; DOSE  INCREASE (Patient taking differently: Take 100 mg by mouth every day.) 90 Tablet 3    VITAMIN D PO Take 1 Tablet by mouth every day.      Cyanocobalamin (VITAMIN B 12 PO) Take 2,500 mcg by mouth every day.       No current facility-administered medications for this visit.         No Known Allergies      Physical Exam:  Ambulatory Vitals  /78 (BP Location: Left arm, Patient Position: Sitting, BP Cuff Size: Adult)   Pulse 64   Ht 1.651 m (5' 5\")   Wt 98.9 kg (218 lb)   SpO2 96%    Oxygen Therapy:  Pulse Oximetry: 96 %  BP Readings from Last 4 Encounters:   02/19/25 124/78   01/02/25 136/74   09/13/24 122/68   07/27/24 (!) 161/82       Weight/BMI: Body mass index is 36.28 kg/m².  Wt Readings from Last 4 Encounters:   02/19/25 98.9 kg (218 lb)   01/02/25 98.4 kg (217 lb)   09/13/24 97.1 kg (214 lb)   07/27/24 96.9 kg (213 lb 10 oz)       General: Well appearing and in no apparent distress  Eyes: nl " conjunctiva, no icteric sclera  ENT: normal external appearance of ears, nose, and throat  Neck: no visible JVP,  no carotid bruits  Lungs: normal respiratory effort, CTAB  Heart: RRR, no murmurs, no rubs or gallops,  no edema bilateral lower extremities. No LV/RV heave on cardiac palpatation. + bilateral radial pulses.  + bilateral dp pulses.   Abdomen: soft, non tender, non distended, no masses, normal bowel sounds.  No HSM.  Extremities/MSK: no clubbing, no cyanosis  Neurological: No focal sensory deficits  Psychiatric: Appropriate affect, A/O x 3, intact judgement and insight  Skin: Warm extremities      Lab Data Review:  Lab Results   Component Value Date/Time    CHOLSTRLTOT 162 08/23/2024 08:40 AM    LDL 86 08/23/2024 08:40 AM    HDL 51 08/23/2024 08:40 AM    TRIGLYCERIDE 124 08/23/2024 08:40 AM       Lab Results   Component Value Date/Time    SODIUM 140 08/23/2024 08:40 AM    POTASSIUM 4.1 08/23/2024 08:40 AM    CHLORIDE 107 08/23/2024 08:40 AM    CO2 21 08/23/2024 08:40 AM    GLUCOSE 93 08/23/2024 08:40 AM    BUN 10 08/23/2024 08:40 AM    CREATININE 0.50 08/23/2024 08:40 AM     Lab Results   Component Value Date/Time    ALKPHOSPHAT 123 (H) 08/23/2024 08:40 AM    ASTSGOT 14 08/23/2024 08:40 AM    ALTSGPT 9 08/23/2024 08:40 AM    TBILIRUBIN 0.4 08/23/2024 08:40 AM      Lab Results   Component Value Date/Time    WBC 4.8 08/23/2024 08:40 AM    HEMOGLOBIN 14.2 08/23/2024 08:40 AM     Lab Results   Component Value Date/Time    HBA1C 5.2 02/26/2021 09:27 AM    HBA1C 5.5 09/02/2020 08:30 AM         Cardiac Imaging and Procedures Review:    EKG dated 3/12/2024: My personal interpretation is sinus rhythm, LVH    EKG dated 3/11/2024: My personal interpretation is atrial fibrillation, rate 130    Echocardiogram 4/3/2024  CONCLUSIONS  Normal left ventricular systolic function. The ejection fraction is   measured to be 61 % by Vazquez's biplane.   Grade I diastolic dysfunction.  Normal right ventricular size and systolic  function.  No significant valvular abnormalities.   No prior study is available for comparison.      Assessment & Plan     1. Paroxysmal A-fib (HCC)        2. Hypercoagulable state due to paroxysmal atrial fibrillation (HCC)        3. Essential hypertension        4. Dyslipidemia                Shared Medical Decision Making:    Paroxysmal A-fib  Hypercoagulable state due to paroxymal AF  Currently in sinus rhythm.  Likely triggered by knee surgery and dehydration.  Echo 4/2024 with preserved LVEF  -[id number] at least 3 (age, female, hypertension)  -Continue Eliquis 5 mg twice daily.  Discussed fall precautions and risks and benefits of being on systemic anticoagulation.  Patient active and doing well.  -Continue carvedilol 3.125 mg twice daily    Hypertension  BP controlled this visit  -Continue losartan and amlodipine  -Continue carvedilol 3.125 mg twice daily    Dyslipidemia  -Continue atorvastatin 10mg daily    All of the patient's excellent questions were answered to the best of my knowledge and to her satisfaction.  It was a pleasure seeing Ms. Mini Pham in my clinic today. Return in about 1 year (around 2/19/2026), or if symptoms worsen or fail to improve. Patient is aware to call the cardiology clinic with any questions or concerns.      Anthony Ling MD  [facility]  [place] [facility], [address]  [address] [address]  Phone: [phone]  Fax: [phone]

## 2025-02-20 ENCOUNTER — PHYSICAL THERAPY (OUTPATIENT)
Dept: PHYSICAL THERAPY | Facility: MEDICAL CENTER | Age: 75
End: 2025-02-20
Attending: FAMILY MEDICINE
Payer: MEDICARE

## 2025-02-20 DIAGNOSIS — H81.10 BPPV (BENIGN PAROXYSMAL POSITIONAL VERTIGO), UNSPECIFIED LATERALITY: ICD-10-CM

## 2025-02-20 PROCEDURE — 95992 CANALITH REPOSITIONING PROC: CPT | Mod: KX

## 2025-02-20 PROCEDURE — 97112 NEUROMUSCULAR REEDUCATION: CPT | Mod: KX

## 2025-02-23 DIAGNOSIS — I48.0 PAROXYSMAL A-FIB (HCC): ICD-10-CM

## 2025-02-23 DIAGNOSIS — I10 ESSENTIAL HYPERTENSION: ICD-10-CM

## 2025-02-24 RX ORDER — CARVEDILOL 3.12 MG/1
3.12 TABLET ORAL 2 TIMES DAILY WITH MEALS
Qty: 180 TABLET | Refills: 3 | Status: SHIPPED | OUTPATIENT
Start: 2025-02-24

## 2025-03-04 DIAGNOSIS — F32.5 MAJOR DEPRESSION IN REMISSION (HCC): ICD-10-CM

## 2025-03-04 DIAGNOSIS — F41.1 GAD (GENERALIZED ANXIETY DISORDER): ICD-10-CM

## 2025-03-04 DIAGNOSIS — Z86.59 HISTORY OF PANIC ATTACKS: ICD-10-CM

## 2025-03-04 RX ORDER — CITALOPRAM HYDROBROMIDE 20 MG/1
20 TABLET ORAL DAILY
Qty: 90 TABLET | Refills: 3 | Status: SHIPPED | OUTPATIENT
Start: 2025-03-04

## 2025-03-04 NOTE — OP THERAPY DAILY TREATMENT
"  Outpatient Physical Therapy  DAILY TREATMENT     [facility] Outpatient Physical Therapy  [address]  [place] [address]  Phone:  [phone]  Fax:  [phone]    Date: 03/05/2025    Patient: Mini Pham  YOB: 1950  MRN: [id number]     Time Calculation    Start time: 0748  Stop time: 0826 Time Calculation (min): 38 minutes         Chief Complaint: Vertigo    Visit #: 3    SUBJECTIVE:  I'm cured! No vertigo, no dizziness, no concerns with instability. Saw the ENT and was told she has eustachian tube disorder. Started taking flonase. Hearing loss was only mild.     OBJECTIVE:      Therapeutic Treatments and Modalities:     1. Neuromuscular Re-education (CPT 44704)    Therapeutic Treatment and Modalities Summary:   Ankle sways: AP and circular EO/EC x 1 min ea  4 way balance on tilt: EC x 30\" ea  Rocker board: AP and lateral sways, AP and lateral holds stable, AP and Lateral holds with HTs x 10 ea (challenging)  Practice at \"just right\" balance challenge: worked from EC sharp ->HTs->head circles ->EC 1/2 tandem-> + HTs (optimal)      Access Code: [email or web address]  URL: [email or web address]  Date: 02/20/2025  Prepared by:     Exercises  - Body [place] Sway  - 5 x weekly - 1 min each hold  - Wide Tandem Stance with Eyes Closed  - 5 x weekly - 1 minute hold    Time-based treatments/modalities:    Physical Therapy Timed Treatment Charges  Neuromusc re-ed, balance, coor, post minutes (CPT 00373): 38 minutes    ASSESSMENT:   Response to treatment: BPPV is resolved, as is subjective dizziness. Demonstrating normal VOR/VSR/VCR function. Receptive to balance education today as a consideration for long term fall reduction strategy. Otherwise, no further skilled need at this time. Hold episode of care to allow follow up if the vertigo were to relapse. Discharge if no return in 30 days.     PLAN/RECOMMENDATIONS:   Plan for treatment: therapy treatment to continue " next visit.  Planned interventions for next visit: continue with current treatment.

## 2025-03-04 NOTE — TELEPHONE ENCOUNTER
Received request via: Pharmacy    Was the patient seen in the last year in this department? Yes    Does the patient have an active prescription (recently filled or refills available) for medication(s) requested? No    Pharmacy Name:  [facility] - [place] - [facility] AT [facility] to [facility] Sites     Does the patient have [facility] and need 100-day supply? (This applies to ALL medications) Patient does not have SCP

## 2025-03-05 ENCOUNTER — PHYSICAL THERAPY (OUTPATIENT)
Dept: PHYSICAL THERAPY | Facility: MEDICAL CENTER | Age: 75
End: 2025-03-05
Attending: PHYSICIAN ASSISTANT
Payer: MEDICARE

## 2025-03-05 DIAGNOSIS — H81.10 BPPV (BENIGN PAROXYSMAL POSITIONAL VERTIGO), UNSPECIFIED LATERALITY: ICD-10-CM

## 2025-03-05 PROCEDURE — 97112 NEUROMUSCULAR REEDUCATION: CPT

## 2025-03-13 ENCOUNTER — APPOINTMENT (OUTPATIENT)
Dept: PHYSICAL THERAPY | Facility: MEDICAL CENTER | Age: 75
End: 2025-03-13
Attending: PHYSICIAN ASSISTANT
Payer: MEDICARE

## 2025-03-14 ENCOUNTER — OFFICE VISIT (OUTPATIENT)
Dept: MEDICAL GROUP | Facility: MEDICAL CENTER | Age: 75
End: 2025-03-14
Payer: MEDICARE

## 2025-03-14 ENCOUNTER — HOSPITAL ENCOUNTER (OUTPATIENT)
Dept: LAB | Facility: MEDICAL CENTER | Age: 75
End: 2025-03-14
Attending: NURSE PRACTITIONER
Payer: MEDICARE

## 2025-03-14 VITALS
HEART RATE: 63 BPM | DIASTOLIC BLOOD PRESSURE: 72 MMHG | HEIGHT: 65 IN | TEMPERATURE: 96.9 F | SYSTOLIC BLOOD PRESSURE: 124 MMHG | WEIGHT: 215.6 LBS | BODY MASS INDEX: 35.92 KG/M2 | RESPIRATION RATE: 16 BRPM | OXYGEN SATURATION: 95 %

## 2025-03-14 DIAGNOSIS — E78.5 DYSLIPIDEMIA: ICD-10-CM

## 2025-03-14 DIAGNOSIS — E66.812 OBESITY, CLASS II, BMI 35-39.9: ICD-10-CM

## 2025-03-14 DIAGNOSIS — E55.9 VITAMIN D DEFICIENCY: ICD-10-CM

## 2025-03-14 DIAGNOSIS — M85.89 OSTEOPENIA OF MULTIPLE SITES: ICD-10-CM

## 2025-03-14 DIAGNOSIS — I48.0 PAROXYSMAL A-FIB (HCC): ICD-10-CM

## 2025-03-14 DIAGNOSIS — Z78.0 POSTMENOPAUSAL STATUS: ICD-10-CM

## 2025-03-14 DIAGNOSIS — I10 ESSENTIAL HYPERTENSION: ICD-10-CM

## 2025-03-14 DIAGNOSIS — Z79.01 CHRONIC ANTICOAGULATION: ICD-10-CM

## 2025-03-14 PROBLEM — M17.12 ARTHRITIS OF LEFT KNEE: Status: RESOLVED | Noted: 2024-02-26 | Resolved: 2025-03-14

## 2025-03-14 LAB
ALBUMIN SERPL BCP-MCNC: 4.1 G/DL (ref 3.2–4.9)
ALBUMIN/GLOB SERPL: 1.4 G/DL
ALP SERPL-CCNC: 116 U/L (ref 30–99)
ALT SERPL-CCNC: 10 U/L (ref 2–50)
ANION GAP SERPL CALC-SCNC: 11 MMOL/L (ref 7–16)
AST SERPL-CCNC: 24 U/L (ref 12–45)
BILIRUB SERPL-MCNC: 0.4 MG/DL (ref 0.1–1.5)
BUN SERPL-MCNC: 12 MG/DL (ref 8–22)
CALCIUM ALBUM COR SERPL-MCNC: 9 MG/DL (ref 8.5–10.5)
CALCIUM SERPL-MCNC: 9.1 MG/DL (ref 8.5–10.5)
CHLORIDE SERPL-SCNC: 106 MMOL/L (ref 96–112)
CO2 SERPL-SCNC: 23 MMOL/L (ref 20–33)
CREAT SERPL-MCNC: 0.7 MG/DL (ref 0.5–1.4)
ERYTHROCYTE [DISTWIDTH] IN BLOOD BY AUTOMATED COUNT: 42.4 FL (ref 35.9–50)
GFR SERPLBLD CREATININE-BSD FMLA CKD-EPI: 91 ML/MIN/1.73 M 2
GLOBULIN SER CALC-MCNC: 2.9 G/DL (ref 1.9–3.5)
GLUCOSE SERPL-MCNC: 89 MG/DL (ref 65–99)
HCT VFR BLD AUTO: 43.4 % (ref 37–47)
HGB BLD-MCNC: 13.9 G/DL (ref 12–16)
MCH RBC QN AUTO: 27 PG (ref 27–33)
MCHC RBC AUTO-ENTMCNC: 32 G/DL (ref 32.2–35.5)
MCV RBC AUTO: 84.4 FL (ref 81.4–97.8)
PLATELET # BLD AUTO: 338 K/UL (ref 164–446)
PMV BLD AUTO: 10.6 FL (ref 9–12.9)
POTASSIUM SERPL-SCNC: 4.5 MMOL/L (ref 3.6–5.5)
PROT SERPL-MCNC: 7 G/DL (ref 6–8.2)
RBC # BLD AUTO: 5.14 M/UL (ref 4.2–5.4)
SODIUM SERPL-SCNC: 140 MMOL/L (ref 135–145)
WBC # BLD AUTO: 4.4 K/UL (ref 4.8–10.8)

## 2025-03-14 PROCEDURE — 36415 COLL VENOUS BLD VENIPUNCTURE: CPT

## 2025-03-14 PROCEDURE — 3078F DIAST BP <80 MM HG: CPT | Performed by: FAMILY MEDICINE

## 2025-03-14 PROCEDURE — 99214 OFFICE O/P EST MOD 30 MIN: CPT | Performed by: FAMILY MEDICINE

## 2025-03-14 PROCEDURE — 80053 COMPREHEN METABOLIC PANEL: CPT

## 2025-03-14 PROCEDURE — 85027 COMPLETE CBC AUTOMATED: CPT

## 2025-03-14 PROCEDURE — 3074F SYST BP LT 130 MM HG: CPT | Performed by: FAMILY MEDICINE

## 2025-03-14 RX ORDER — ATORVASTATIN CALCIUM 10 MG/1
10 TABLET, FILM COATED ORAL DAILY
Qty: 90 TABLET | Refills: 3 | Status: SHIPPED | OUTPATIENT
Start: 2025-03-14

## 2025-03-14 RX ORDER — LOSARTAN POTASSIUM 100 MG/1
100 TABLET ORAL DAILY
Qty: 90 TABLET | Refills: 3 | Status: SHIPPED | OUTPATIENT
Start: 2025-03-14

## 2025-03-14 ASSESSMENT — FIBROSIS 4 INDEX: FIB4 SCORE: 1.01

## 2025-03-14 NOTE — PROGRESS NOTES
Chief Complaint   Patient presents with    Follow-Up     No new concerns     Hypertension       Subjective:     HPI:   Mini Pham presents today with the followin. Essential hypertension  HTN - Chronic condition stable. Currently taking all meds as directed.  Very stressed and worried over her sisters current medical situation.  Blood pressure remains very good.  She is not taking baby aspirin daily.  She is on chronic Eliquis.  She is monitoring BP at home.  Has been fairly good.  Very good here today.  Denies symptoms low BP: light-headed, tunnel-vision, unusual fatigue.   Denies symptoms high BP:pounding headache, visual changes, palpitations, flushed face.   Denies medicine side effects: unusual fatigue, slow heartbeat, foot/leg swelling, cough.  Losartan renewal is sent.    2. Dyslipidemia  Patient denies chest pain, chest pressure, palpitations or exertional shortness of breath. Patient denies muscle aches or muscle weakness from the atorvastatin medication. Patient is a former smoker. Patient takes no aspirin daily.  She is chronically anticoagulated on Eliquis.  Patient has no history of myocardial infarction, stroke or PVD.  Did have atrial fibrillation in the past associated with surgery but otherwise none.  Follow-up lab order discussed and placed.    3. Osteopenia of multiple sites/Vitamin D deficiency/Postmenopausal status  Patient does have osteopenia.  She is due for bone density testing.  Order discussed and placed.  She is also due for follow-up vitamin D testing.  Order discussed and placed.    4. Obesity, Class II, BMI 35-39.9  Patient has maintained her excellent weight loss.  She is stable.        Patient Active Problem List    Diagnosis Date Noted    Paroxysmal A-fib (HCC) 03/15/2024    Presence of total knee joint prosthesis, left 03/15/2024    History of iron deficiency anemia 10/18/2023    Obesity, Class II, BMI 35-39.9 10/18/2023    Intestinal metaplasia of stomach 10/03/2022     "Vitamin D deficiency 02/13/2020    Major depression in remission (HCC) 10/08/2019    LUCY (generalized anxiety disorder) 10/08/2019    Atrophic gastritis without hemorrhage 10/08/2019    Essential hypertension 02/25/2019    Dyslipidemia 02/25/2019    Primary osteoarthritis of both knees 02/25/2019       Current medicines (including changes today)  Current Outpatient Medications   Medication Sig Dispense Refill    atorvastatin (LIPITOR) 10 MG Tab Take 1 Tablet by mouth every day. 90 Tablet 3    losartan (COZAAR) 100 MG Tab Take 1 Tablet by mouth every day. 90 Tablet 3    citalopram (CELEXA) 20 MG Tab TAKE 1 TABLET DAILY 90 Tablet 3    carvedilol (COREG) 3.125 MG Tab TAKE 1 TABLET BY MOUTH TWICE A DAY WITH FOOD 180 Tablet 3    ELIQUIS 5 MG Tab TAKE 1 TABLET BY MOUTH TWICE A  Tablet 1    amLODIPine (NORVASC) 10 MG Tab Take 1 Tablet by mouth every day. 90 Tablet 3    Amoxicillin 500 MG Tab Take 4 Tablets by mouth see administration instructions. Take 1 hour prior to dental procedure or other invasive procedure.      VITAMIN D PO Take 1 Tablet by mouth every day.      Cyanocobalamin (VITAMIN B 12 PO) Take 2,500 mcg by mouth every day.       No current facility-administered medications for this visit.       No Known Allergies    ROS: As per HPI       Objective:     /72 (BP Location: Right arm, Patient Position: Sitting, BP Cuff Size: Adult)   Pulse 63   Temp 36.1 °C (96.9 °F) (Temporal)   Resp 16   Ht 1.651 m (5' 5\")   Wt 97.8 kg (215 lb 9.6 oz)   SpO2 95%  Body mass index is 35.88 kg/m².    Physical Exam:  Constitutional: Well-developed and well-nourished. Not diaphoretic. No distress. Lucid and fluent.  Skin: Skin is warm and dry. No rash noted.  Head: Atraumatic without lesions.  Eyes: Conjunctivae and extraocular motions are normal. Pupils are equal, round, and reactive to light. No scleral icterus.   Ears:  External ears unremarkable.   Neck: Supple, trachea midline. No thyromegaly present. No " cervical or supraclavicular lymphadenopathy. No JVD or carotid bruits appreciated  Cardiovascular: Regular rate and rhythm.  Normal S1, S2 without murmur appreciated.  Chest: Effort normal. Clear to auscultation throughout. No adventitious sounds.   Abdomen: Soft, non tender, and without distention. Active bowel sounds in all four quadrants. No rebound, guarding, masses or hepatosplenomegaly.  Extremities: No cyanosis, clubbing, erythema, nor edema.   Neurological: Alert and oriented x 3.   Psychiatric:  Behavior, mood, and affect are appropriate.       Assessment and Plan:     75 y.o. female with the following issues:    1. Essential hypertension  losartan (COZAAR) 100 MG Tab      2. Dyslipidemia  atorvastatin (LIPITOR) 10 MG Tab    Lipid Profile      3. Osteopenia of multiple sites  DS-BONE DENSITY STUDY (DEXA)    VITAMIN D,25 HYDROXY (DEFICIENCY)      4. Vitamin D deficiency  VITAMIN D,25 HYDROXY (DEFICIENCY)      5. Postmenopausal status        6. Obesity, Class II, BMI 35-39.9  Patient identified as having weight management issue.  Appropriate orders and counseling given.            Followup: Return in about 6 months (around 9/14/2025), or if symptoms worsen or fail to improve.

## 2025-03-20 ENCOUNTER — APPOINTMENT (OUTPATIENT)
Dept: PHYSICAL THERAPY | Facility: MEDICAL CENTER | Age: 75
End: 2025-03-20
Attending: PHYSICIAN ASSISTANT
Payer: MEDICARE

## 2025-03-28 ENCOUNTER — APPOINTMENT (OUTPATIENT)
Dept: PHYSICAL THERAPY | Facility: MEDICAL CENTER | Age: 75
End: 2025-03-28
Attending: PHYSICIAN ASSISTANT
Payer: MEDICARE

## 2025-03-31 ENCOUNTER — ANTICOAGULATION VISIT (OUTPATIENT)
Dept: VASCULAR LAB | Facility: MEDICAL CENTER | Age: 75
End: 2025-03-31
Attending: INTERNAL MEDICINE
Payer: MEDICARE

## 2025-03-31 VITALS — DIASTOLIC BLOOD PRESSURE: 83 MMHG | SYSTOLIC BLOOD PRESSURE: 132 MMHG | HEART RATE: 59 BPM

## 2025-03-31 DIAGNOSIS — I48.0 PAROXYSMAL A-FIB (HCC): ICD-10-CM

## 2025-03-31 PROCEDURE — 99212 OFFICE O/P EST SF 10 MIN: CPT

## 2025-03-31 NOTE — PROGRESS NOTES
Target end date: Indefinite      Indication: AF     Drug: Eliquis 5mg BID     CHADsVASC = 3 (HTN, age, female)     HAS-BLED = 1 (age)    Health Status Since Last Assessment   Patient denies any new relevant medical problems, ED visits or hospitalizations   Patient denies any embolic events (stroke/tia/systemic embolism)    Adherence with DOAC Therapy   Pt has NO missed any doses in the average week    Bleeding Risk Assessment     Denies  Epistaxis   Pt denies any excessive or unusual bleeding/hematomas.  Pt denies any GI bleeds or hematemesis.  Pt denies any concerning daily headache or sub dural hematoma symptoms.     Pt denies any hematuria    Latest Hemoglobin 13.9   ETOH overuse no, but varies, about 1/day or less     Creatinine Clearance/Renal Function     Latest ClCr > 100 ml/min    Latest Reference Range & Units 03/14/25 08:45   Creatinine 0.50 - 1.40 mg/dL 0.70   GFR (CKD-EPI) >60 mL/min/1.73 m 2 91     Hepatic function   Latest LFTs WNL    Pt denies any history of liver dysfunction    Latest Reference Range & Units 03/14/25 08:45   AST(SGOT) 12 - 45 U/L 24   ALT(SGPT) 2 - 50 U/L 10      Drug Interactions   Platelets: 338   ASA/other antiplatelets none   NSAID denies   Other drug interactions none   Verified no anticonvulsant or azole therapy, education provided for future use.     Examination   Blood Pressure 132/83   Symptomatic hypotension denies   Significant gait impairment/imbalance/high risk for falls? none    Final Assessment and Recommendations:   Patient appears stable from the anticoagulation standpoint.     Benefits of continued DOAC therapy outweigh risks for this patient   Recommend pt continue with current DOAC therapy with Eliquis 5mg BID   DOAC is affordable     Other Actions: cmp/ cbc hemogram ordered prior to next visit    Follow up:   Will follow up with patient 6 months.      Layla Bernardo  PharmD

## 2025-04-08 ENCOUNTER — APPOINTMENT (OUTPATIENT)
Dept: PHYSICAL THERAPY | Facility: MEDICAL CENTER | Age: 75
End: 2025-04-08
Attending: PHYSICIAN ASSISTANT
Payer: MEDICARE

## 2025-04-11 ENCOUNTER — APPOINTMENT (OUTPATIENT)
Dept: PHYSICAL THERAPY | Facility: MEDICAL CENTER | Age: 75
End: 2025-04-11
Attending: PHYSICIAN ASSISTANT
Payer: MEDICARE

## 2025-04-15 ENCOUNTER — APPOINTMENT (OUTPATIENT)
Dept: PHYSICAL THERAPY | Facility: MEDICAL CENTER | Age: 75
End: 2025-04-15
Attending: PHYSICIAN ASSISTANT
Payer: MEDICARE

## 2025-04-18 ENCOUNTER — APPOINTMENT (OUTPATIENT)
Dept: PHYSICAL THERAPY | Facility: MEDICAL CENTER | Age: 75
End: 2025-04-18
Attending: PHYSICIAN ASSISTANT
Payer: MEDICARE

## 2025-04-22 ENCOUNTER — APPOINTMENT (OUTPATIENT)
Dept: PHYSICAL THERAPY | Facility: MEDICAL CENTER | Age: 75
End: 2025-04-22
Attending: PHYSICIAN ASSISTANT
Payer: MEDICARE

## 2025-04-25 ENCOUNTER — APPOINTMENT (OUTPATIENT)
Dept: PHYSICAL THERAPY | Facility: MEDICAL CENTER | Age: 75
End: 2025-04-25
Attending: PHYSICIAN ASSISTANT
Payer: MEDICARE

## 2025-04-25 ENCOUNTER — APPOINTMENT (OUTPATIENT)
Dept: LAB | Facility: MEDICAL CENTER | Age: 75
End: 2025-04-25
Payer: MEDICARE

## 2025-04-29 ENCOUNTER — APPOINTMENT (OUTPATIENT)
Dept: PHYSICAL THERAPY | Facility: MEDICAL CENTER | Age: 75
End: 2025-04-29
Attending: PHYSICIAN ASSISTANT
Payer: MEDICARE

## 2025-04-30 ENCOUNTER — HOSPITAL ENCOUNTER (OUTPATIENT)
Dept: LAB | Facility: MEDICAL CENTER | Age: 75
End: 2025-04-30
Attending: NURSE PRACTITIONER
Payer: MEDICARE

## 2025-04-30 DIAGNOSIS — I48.0 PAROXYSMAL A-FIB (HCC): ICD-10-CM

## 2025-04-30 LAB
ALBUMIN SERPL BCP-MCNC: 4.2 G/DL (ref 3.2–4.9)
ALBUMIN/GLOB SERPL: 1.5 G/DL
ALP SERPL-CCNC: 111 U/L (ref 30–99)
ALT SERPL-CCNC: 11 U/L (ref 2–50)
ANION GAP SERPL CALC-SCNC: 7 MMOL/L (ref 7–16)
AST SERPL-CCNC: 18 U/L (ref 12–45)
BILIRUB SERPL-MCNC: 0.3 MG/DL (ref 0.1–1.5)
BUN SERPL-MCNC: 13 MG/DL (ref 8–22)
CALCIUM ALBUM COR SERPL-MCNC: 8.9 MG/DL (ref 8.5–10.5)
CALCIUM SERPL-MCNC: 9.1 MG/DL (ref 8.5–10.5)
CHLORIDE SERPL-SCNC: 106 MMOL/L (ref 96–112)
CO2 SERPL-SCNC: 25 MMOL/L (ref 20–33)
CREAT SERPL-MCNC: 0.64 MG/DL (ref 0.5–1.4)
ERYTHROCYTE [DISTWIDTH] IN BLOOD BY AUTOMATED COUNT: 42.2 FL (ref 35.9–50)
GFR SERPLBLD CREATININE-BSD FMLA CKD-EPI: 93 ML/MIN/1.73 M 2
GLOBULIN SER CALC-MCNC: 2.8 G/DL (ref 1.9–3.5)
GLUCOSE SERPL-MCNC: 98 MG/DL (ref 65–99)
HCT VFR BLD AUTO: 42.7 % (ref 37–47)
HGB BLD-MCNC: 13.9 G/DL (ref 12–16)
MCH RBC QN AUTO: 27.5 PG (ref 27–33)
MCHC RBC AUTO-ENTMCNC: 32.6 G/DL (ref 32.2–35.5)
MCV RBC AUTO: 84.6 FL (ref 81.4–97.8)
PLATELET # BLD AUTO: 344 K/UL (ref 164–446)
PMV BLD AUTO: 9.4 FL (ref 9–12.9)
POTASSIUM SERPL-SCNC: 4.1 MMOL/L (ref 3.6–5.5)
PROT SERPL-MCNC: 7 G/DL (ref 6–8.2)
RBC # BLD AUTO: 5.05 M/UL (ref 4.2–5.4)
SODIUM SERPL-SCNC: 138 MMOL/L (ref 135–145)
WBC # BLD AUTO: 6 K/UL (ref 4.8–10.8)

## 2025-04-30 PROCEDURE — 80053 COMPREHEN METABOLIC PANEL: CPT

## 2025-04-30 PROCEDURE — 36415 COLL VENOUS BLD VENIPUNCTURE: CPT

## 2025-04-30 PROCEDURE — 85027 COMPLETE CBC AUTOMATED: CPT

## 2025-05-01 ENCOUNTER — APPOINTMENT (OUTPATIENT)
Dept: RADIOLOGY | Facility: MEDICAL CENTER | Age: 75
End: 2025-05-01
Attending: FAMILY MEDICINE
Payer: MEDICARE

## 2025-05-01 DIAGNOSIS — M85.89 OSTEOPENIA OF MULTIPLE SITES: ICD-10-CM

## 2025-05-01 PROCEDURE — 77080 DXA BONE DENSITY AXIAL: CPT

## 2025-05-04 ENCOUNTER — RESULTS FOLLOW-UP (OUTPATIENT)
Dept: MEDICAL GROUP | Facility: MEDICAL CENTER | Age: 75
End: 2025-05-04

## 2025-05-23 ENCOUNTER — TELEPHONE (OUTPATIENT)
Dept: VASCULAR LAB | Facility: MEDICAL CENTER | Age: 75
End: 2025-05-23
Payer: MEDICARE

## 2025-05-23 NOTE — TELEPHONE ENCOUNTER
Called and advised pt that these reminders are for her DOAC f/u appts. She is currently following up appropriately w/ anticoag clinic for surveillance. Next appt in 10/2025.    Requested pt c/b if she requires further information.    José Miguel Meza, CarlosD, BCACP

## 2025-05-23 NOTE — TELEPHONE ENCOUNTER
Caller: Mini Pham    Topic/issue: Patient keeps getting reminder on her MyChart to schedule an INR. But she states that the last time she was in for an appointment, they told her she didn't need to complete any INR's due to the medications she is on.   Patient asking if your office can discontinue these reminders from sending or advise if she does need to complete an INR?    Callback Number: [phone]    Thank you,  Rain CONNER

## 2025-06-09 DIAGNOSIS — I10 ESSENTIAL HYPERTENSION: ICD-10-CM

## 2025-06-09 RX ORDER — AMLODIPINE BESYLATE 10 MG/1
10 TABLET ORAL DAILY
Qty: 90 TABLET | Refills: 3 | Status: SHIPPED | OUTPATIENT
Start: 2025-06-09

## 2025-06-17 ENCOUNTER — TELEPHONE (OUTPATIENT)
Dept: PHYSICAL THERAPY | Facility: MEDICAL CENTER | Age: 75
End: 2025-06-17
Payer: MEDICARE

## 2025-06-17 NOTE — OP THERAPY DISCHARGE SUMMARY
Outpatient Physical Therapy  DISCHARGE SUMMARY NOTE      [facility]patient [facility]  [address]  [place] [address]  Phone:  [phone]  Fax:  [phone]    Date of Visit: 06/17/2025    Patient: Mini Pham  YOB: 1950  MRN: [id number]     Referring Provider: Myriam Valle P.A.-C.  [address]  [address]  [place] [address]   Referring Diagnosis Benign paroxysmal vertigo, unspecified ear [H81.10]          Functional Assessment Used        Your patient is being discharged from Physical Therapy with the following comments:   Goals met    Comments:  Mini was seen for 2 PT sessions treating her dizziness, which was found to be consistent with  BPPV.  The subjective vertigo and nystagmus resolved after utilization of the indicated CRM. Reassessment on 2/20/25 demonstrated VOR and balance restored to WNL.  The chart was held open x 30 days in case of relapse, but follow up was not indicated as sx are stable. Ok to d/c. Welcome to return with a new referral if indicated by change in status.      Denisa Abraham, PT, DPT    Date: 6/17/2025

## 2025-06-18 DIAGNOSIS — E78.5 DYSLIPIDEMIA: ICD-10-CM

## 2025-06-18 RX ORDER — ATORVASTATIN CALCIUM 10 MG/1
10 TABLET, FILM COATED ORAL DAILY
Qty: 90 TABLET | Refills: 3 | Status: SHIPPED | OUTPATIENT
Start: 2025-06-18

## 2025-08-08 DIAGNOSIS — I48.91 ATRIAL FIBRILLATION WITH RVR (HCC): ICD-10-CM

## 2025-08-08 DIAGNOSIS — I48.0 PAROXYSMAL A-FIB (HCC): ICD-10-CM

## 2025-08-08 RX ORDER — APIXABAN 5 MG/1
5 TABLET, FILM COATED ORAL 2 TIMES DAILY
Qty: 180 TABLET | Refills: 1 | Status: SHIPPED | OUTPATIENT
Start: 2025-08-08

## 2025-08-22 ENCOUNTER — APPOINTMENT (OUTPATIENT)
Dept: LAB | Facility: MEDICAL CENTER | Age: 75
End: 2025-08-22
Payer: MEDICARE

## 2025-11-05 ENCOUNTER — APPOINTMENT (OUTPATIENT)
Dept: MEDICAL GROUP | Facility: MEDICAL CENTER | Age: 75
End: 2025-11-05
Payer: MEDICARE

## (undated) DEVICE — STAPLER SKIN DISP - (6/BX 10BX/CA) VISISTAT

## (undated) DEVICE — KIT DRAPE RIO ONE PIECE WITH POCKETS (1EA)

## (undated) DEVICE — PAD PREP 24 X 48 CUFFED - (100/CA)

## (undated) DEVICE — SENSOR OXIMETER ADULT SPO2 RD SET (20EA/BX)

## (undated) DEVICE — SUCTION INSTRUMENT YANKAUER BULBOUS TIP W/O VENT (50EA/CA)

## (undated) DEVICE — Device

## (undated) DEVICE — TOWEL STOP TIMEOUT SAFETY FLAG (40EA/CA)

## (undated) DEVICE — PACK TOTAL KNEE  (1/CA)

## (undated) DEVICE — GOWN WARMING STANDARD FLEX - (30/CA)

## (undated) DEVICE — SODIUM CHL IRRIGATION 0.9% 1000ML (12EA/CA)

## (undated) DEVICE — HANDPIECE 10FT INTPLS SCT PLS IRRIGATION HAND CONTROL SET (6/PK)

## (undated) DEVICE — PIN FIXATION BONE STERILE 3.2MM X 140MM (2EA/PK)

## (undated) DEVICE — GLOVE BIOGEL PI INDICATOR SZ 7.5 SURGICAL PF LF -(50/BX 4BX/CA)

## (undated) DEVICE — HUMID-VENT HEAT AND MOISTURE EXCHANGE- (50/BX)

## (undated) DEVICE — PIN FIXATION BONE STERILE 3.2MM X 110MM (2EA/PK)

## (undated) DEVICE — LACTATED RINGERS INJ 1000 ML - (14EA/CA 60CA/PF)

## (undated) DEVICE — GLOVE BIOGEL SZ 7.5 SURGICAL PF LTX - (50PR/BX 4BX/CA)

## (undated) DEVICE — TOURNIQUET CUFF 34 X 4 ONE PORT DISP - STERILE (10/BX)

## (undated) DEVICE — MIXER BONE CEMENT REVOLUTION - W/FEMORAL PRESSURIZER (6/CA)

## (undated) DEVICE — TIP INTPLS HFLO ML ORFC BTRY - (12/CS)  FOR SURGILAV

## (undated) DEVICE — SYSTEM NAVIGATION VIZADISC KNEE PROCEDURE TRACKING KIT (1EA)

## (undated) DEVICE — CANISTER SUCTION RIGID RED 1500CC (40EA/CA)

## (undated) DEVICE — TUBING CLEARLINK DUO-VENT - C-FLO (48EA/CA)

## (undated) DEVICE — GLOVE BIOGEL SZ 8 SURGICAL PF LTX - (50PR/BX 4BX/CA)

## (undated) DEVICE — SODIUM CHL. IRRIGATION 0.9% 3000ML (4EA/CA 65CA/PF)

## (undated) DEVICE — BLADE SAGITTAL 34MM

## (undated) DEVICE — SUTURE 3-0 MONOCRYL PLUS PS-1 - 27 INCH (36/BX)

## (undated) DEVICE — SUTURE 2-0 VICRYL PLUS CT-1 - 8 X 18 INCH(12/BX)

## (undated) DEVICE — SET EXTENSION WITH 2 PORTS (48EA/CA) ***PART #2C8610 IS A SUBSTITUTE*****